# Patient Record
Sex: FEMALE | Race: WHITE | NOT HISPANIC OR LATINO | Employment: FULL TIME | URBAN - METROPOLITAN AREA
[De-identification: names, ages, dates, MRNs, and addresses within clinical notes are randomized per-mention and may not be internally consistent; named-entity substitution may affect disease eponyms.]

---

## 2017-06-06 ENCOUNTER — TRANSCRIBE ORDERS (OUTPATIENT)
Dept: ADMINISTRATIVE | Facility: HOSPITAL | Age: 47
End: 2017-06-06

## 2017-06-06 DIAGNOSIS — Z12.31 ENCOUNTER FOR MAMMOGRAM TO ESTABLISH BASELINE MAMMOGRAM: Primary | ICD-10-CM

## 2017-06-19 ENCOUNTER — APPOINTMENT (EMERGENCY)
Dept: RADIOLOGY | Facility: HOSPITAL | Age: 47
End: 2017-06-19
Payer: COMMERCIAL

## 2017-06-19 ENCOUNTER — APPOINTMENT (EMERGENCY)
Dept: CT IMAGING | Facility: HOSPITAL | Age: 47
End: 2017-06-19
Payer: COMMERCIAL

## 2017-06-19 ENCOUNTER — HOSPITAL ENCOUNTER (EMERGENCY)
Facility: HOSPITAL | Age: 47
Discharge: HOME | End: 2017-06-20
Attending: EMERGENCY MEDICINE | Admitting: EMERGENCY MEDICINE
Payer: COMMERCIAL

## 2017-06-19 VITALS
HEART RATE: 94 BPM | WEIGHT: 125 LBS | RESPIRATION RATE: 18 BRPM | TEMPERATURE: 98.2 F | DIASTOLIC BLOOD PRESSURE: 55 MMHG | OXYGEN SATURATION: 97 % | SYSTOLIC BLOOD PRESSURE: 111 MMHG

## 2017-06-19 DIAGNOSIS — J98.8 VIRAL RESPIRATORY ILLNESS: Primary | ICD-10-CM

## 2017-06-19 DIAGNOSIS — B97.89 VIRAL RESPIRATORY ILLNESS: Primary | ICD-10-CM

## 2017-06-19 LAB
ANION GAP SERPL CALCULATED.3IONS-SCNC: 9 MMOL/L (ref 4–13)
BASOPHILS # BLD AUTO: 0.03 THOUSANDS/ΜL (ref 0–0.1)
BASOPHILS NFR BLD AUTO: 0 % (ref 0–1)
BUN SERPL-MCNC: 14 MG/DL (ref 5–25)
CALCIUM SERPL-MCNC: 9.3 MG/DL (ref 8.3–10.1)
CHLORIDE SERPL-SCNC: 103 MMOL/L (ref 100–108)
CLARITY, POC: CLEAR
CO2 SERPL-SCNC: 26 MMOL/L (ref 21–32)
COLOR, POC: YELLOW
CREAT SERPL-MCNC: 0.89 MG/DL (ref 0.6–1.3)
EOSINOPHIL # BLD AUTO: 0.01 THOUSAND/ΜL (ref 0–0.61)
EOSINOPHIL NFR BLD AUTO: 0 % (ref 0–6)
ERYTHROCYTE [DISTWIDTH] IN BLOOD BY AUTOMATED COUNT: 11.9 % (ref 11.6–15.1)
EXT BILIRUBIN, UA: NEGATIVE
EXT BLOOD URINE: NORMAL
EXT GLUCOSE, UA: NEGATIVE
EXT KETONES: NEGATIVE
EXT NITRITE, UA: NEGATIVE
EXT PH, UA: 6
EXT PROTEIN, UA: NEGATIVE
EXT SPECIFIC GRAVITY, UA: 1
EXT UROBILINOGEN: 0.2
GFR SERPL CREATININE-BSD FRML MDRD: >60 ML/MIN/1.73SQ M
GLUCOSE SERPL-MCNC: 123 MG/DL (ref 65–140)
HCT VFR BLD AUTO: 39.7 % (ref 34.8–46.1)
HGB BLD-MCNC: 13.3 G/DL (ref 11.5–15.4)
LYMPHOCYTES # BLD AUTO: 2.15 THOUSANDS/ΜL (ref 0.6–4.47)
LYMPHOCYTES NFR BLD AUTO: 19 % (ref 14–44)
MCH RBC QN AUTO: 30 PG (ref 26.8–34.3)
MCHC RBC AUTO-ENTMCNC: 33.5 G/DL (ref 31.4–37.4)
MCV RBC AUTO: 90 FL (ref 82–98)
MONOCYTES # BLD AUTO: 0.6 THOUSAND/ΜL (ref 0.17–1.22)
MONOCYTES NFR BLD AUTO: 5 % (ref 4–12)
NEUTROPHILS # BLD AUTO: 8.67 THOUSANDS/ΜL (ref 1.85–7.62)
NEUTS SEG NFR BLD AUTO: 76 % (ref 43–75)
PLATELET # BLD AUTO: 360 THOUSANDS/UL (ref 149–390)
PMV BLD AUTO: 9.5 FL (ref 8.9–12.7)
POTASSIUM SERPL-SCNC: 4.5 MMOL/L (ref 3.5–5.3)
RBC # BLD AUTO: 4.43 MILLION/UL (ref 3.81–5.12)
SODIUM SERPL-SCNC: 138 MMOL/L (ref 136–145)
WBC # BLD AUTO: 11.46 THOUSAND/UL (ref 4.31–10.16)
WBC # BLD EST: NEGATIVE 10*3/UL

## 2017-06-19 PROCEDURE — 80048 BASIC METABOLIC PNL TOTAL CA: CPT | Performed by: EMERGENCY MEDICINE

## 2017-06-19 PROCEDURE — 71020 HB CHEST X-RAY 2VW FRONTAL&LATL: CPT

## 2017-06-19 PROCEDURE — 81002 URINALYSIS NONAUTO W/O SCOPE: CPT | Performed by: EMERGENCY MEDICINE

## 2017-06-19 PROCEDURE — 96374 THER/PROPH/DIAG INJ IV PUSH: CPT

## 2017-06-19 PROCEDURE — 70491 CT SOFT TISSUE NECK W/DYE: CPT

## 2017-06-19 PROCEDURE — 36415 COLL VENOUS BLD VENIPUNCTURE: CPT | Performed by: EMERGENCY MEDICINE

## 2017-06-19 PROCEDURE — 96361 HYDRATE IV INFUSION ADD-ON: CPT

## 2017-06-19 PROCEDURE — 85025 COMPLETE CBC W/AUTO DIFF WBC: CPT | Performed by: EMERGENCY MEDICINE

## 2017-06-19 RX ORDER — ALBUTEROL SULFATE 2.5 MG/3ML
5 SOLUTION RESPIRATORY (INHALATION) ONCE
Status: COMPLETED | OUTPATIENT
Start: 2017-06-19 | End: 2017-06-19

## 2017-06-19 RX ORDER — DIPHENHYDRAMINE HYDROCHLORIDE 50 MG/ML
50 INJECTION INTRAMUSCULAR; INTRAVENOUS ONCE
Status: COMPLETED | OUTPATIENT
Start: 2017-06-20 | End: 2017-06-19

## 2017-06-19 RX ORDER — ALBUTEROL SULFATE 90 UG/1
2 AEROSOL, METERED RESPIRATORY (INHALATION) ONCE
Status: COMPLETED | OUTPATIENT
Start: 2017-06-20 | End: 2017-06-19

## 2017-06-19 RX ADMIN — IPRATROPIUM BROMIDE 0.5 MG: 0.5 SOLUTION RESPIRATORY (INHALATION) at 22:38

## 2017-06-19 RX ADMIN — ALBUTEROL SULFATE 2 PUFF: 90 AEROSOL, METERED RESPIRATORY (INHALATION) at 23:58

## 2017-06-19 RX ADMIN — SODIUM CHLORIDE 1000 ML: 0.9 INJECTION, SOLUTION INTRAVENOUS at 22:37

## 2017-06-19 RX ADMIN — DIPHENHYDRAMINE HYDROCHLORIDE 50 MG: 50 INJECTION, SOLUTION INTRAMUSCULAR; INTRAVENOUS at 23:59

## 2017-06-19 RX ADMIN — IOHEXOL 85 ML: 350 INJECTION, SOLUTION INTRAVENOUS at 23:24

## 2017-06-19 RX ADMIN — ALBUTEROL SULFATE 5 MG: 2.5 SOLUTION RESPIRATORY (INHALATION) at 22:38

## 2017-06-20 PROCEDURE — 94640 AIRWAY INHALATION TREATMENT: CPT

## 2017-06-20 PROCEDURE — 99284 EMERGENCY DEPT VISIT MOD MDM: CPT

## 2017-06-28 ENCOUNTER — ALLSCRIPTS OFFICE VISIT (OUTPATIENT)
Dept: OTHER | Facility: OTHER | Age: 47
End: 2017-06-28

## 2017-06-28 ENCOUNTER — HOSPITAL ENCOUNTER (OUTPATIENT)
Dept: RADIOLOGY | Age: 47
Discharge: HOME | End: 2017-06-28
Payer: COMMERCIAL

## 2017-06-28 DIAGNOSIS — Z12.31 ENCOUNTER FOR SCREENING MAMMOGRAM FOR MALIGNANT NEOPLASM OF BREAST: ICD-10-CM

## 2017-06-28 PROCEDURE — 77063 BREAST TOMOSYNTHESIS BI: CPT

## 2017-06-28 PROCEDURE — G0202 SCR MAMMO BI INCL CAD: HCPCS

## 2017-06-30 LAB — PAP (HISTORICAL): NORMAL

## 2018-01-13 VITALS
BODY MASS INDEX: 24.94 KG/M2 | SYSTOLIC BLOOD PRESSURE: 110 MMHG | DIASTOLIC BLOOD PRESSURE: 70 MMHG | WEIGHT: 127 LBS | HEIGHT: 60 IN

## 2018-06-20 DIAGNOSIS — Z30.41 ENCOUNTER FOR SURVEILLANCE OF CONTRACEPTIVE PILLS: Primary | ICD-10-CM

## 2018-06-20 RX ORDER — NORETHINDRONE AND ETHINYL ESTRADIOL 0.5-0.035
KIT ORAL
Qty: 84 TABLET | Refills: 0 | Status: SHIPPED | OUTPATIENT
Start: 2018-06-20 | End: 2018-07-24 | Stop reason: SDUPTHER

## 2018-06-28 DIAGNOSIS — Z80.3 FAMILY HISTORY OF MALIGNANT NEOPLASM OF BREAST: ICD-10-CM

## 2018-06-28 DIAGNOSIS — Z12.31 ENCOUNTER FOR SCREENING MAMMOGRAM FOR BREAST CANCER: Primary | ICD-10-CM

## 2018-07-02 ENCOUNTER — TRANSCRIBE ORDERS (OUTPATIENT)
Dept: RADIOLOGY | Facility: CLINIC | Age: 48
End: 2018-07-02

## 2018-07-03 ENCOUNTER — HOSPITAL ENCOUNTER (OUTPATIENT)
Dept: RADIOLOGY | Age: 48
Discharge: HOME | End: 2018-07-03
Payer: COMMERCIAL

## 2018-07-03 DIAGNOSIS — Z80.3 FAMILY HISTORY OF MALIGNANT NEOPLASM OF BREAST: ICD-10-CM

## 2018-07-03 DIAGNOSIS — Z12.31 ENCOUNTER FOR SCREENING MAMMOGRAM FOR BREAST CANCER: ICD-10-CM

## 2018-07-03 PROCEDURE — 77067 SCR MAMMO BI INCL CAD: CPT

## 2018-07-03 PROCEDURE — 77063 BREAST TOMOSYNTHESIS BI: CPT

## 2018-07-24 ENCOUNTER — ANNUAL EXAM (OUTPATIENT)
Dept: OBGYN CLINIC | Facility: CLINIC | Age: 48
End: 2018-07-24
Payer: COMMERCIAL

## 2018-07-24 VITALS
HEIGHT: 60 IN | BODY MASS INDEX: 24.15 KG/M2 | SYSTOLIC BLOOD PRESSURE: 110 MMHG | WEIGHT: 123 LBS | DIASTOLIC BLOOD PRESSURE: 68 MMHG

## 2018-07-24 DIAGNOSIS — Z01.419 WELL WOMAN EXAM: Primary | ICD-10-CM

## 2018-07-24 DIAGNOSIS — Z30.41 ENCOUNTER FOR SURVEILLANCE OF CONTRACEPTIVE PILLS: ICD-10-CM

## 2018-07-24 PROBLEM — J45.909 ASTHMA: Status: ACTIVE | Noted: 2017-06-28

## 2018-07-24 PROCEDURE — 99396 PREV VISIT EST AGE 40-64: CPT | Performed by: PHYSICIAN ASSISTANT

## 2018-07-24 RX ORDER — OMEPRAZOLE 20 MG/1
TABLET, DELAYED RELEASE ORAL
COMMUNITY
End: 2018-07-24 | Stop reason: ALTCHOICE

## 2018-07-24 RX ORDER — ALBUTEROL SULFATE 90 UG/1
1 AEROSOL, METERED RESPIRATORY (INHALATION) EVERY 6 HOURS
COMMUNITY
End: 2018-07-24 | Stop reason: ALTCHOICE

## 2018-07-24 RX ORDER — LORAZEPAM 0.5 MG/1
0.5 TABLET ORAL DAILY
Status: ON HOLD | COMMUNITY
Start: 2018-02-27 | End: 2018-10-04 | Stop reason: ALTCHOICE

## 2018-07-24 NOTE — PROGRESS NOTES
Assessment/Plan   Diagnoses and all orders for this visit:    Well woman exam    Encounter for surveillance of contraceptive pills  -     norethindrone-ethinyl estradiol (NECON 0 5/35, 28,) 0 5-35 MG-MCG per tablet; Take 1 tablet by mouth daily    Other orders  -     LORazepam (ATIVAN) 0 5 mg tablet; Take 0 5 mg by mouth daily  -     Discontinue: omeprazole (PRILOSEC OTC) 20 MG tablet; Take by mouth  -     Discontinue: albuterol (PROVENTIL HFA,VENTOLIN HFA) 90 mcg/act inhaler; Inhale 1 puff every 6 (six) hours        Discussion  I have discussed the importance of monthly self-breast exams, exercise and healthy diet as well as adequate intake of calcium and vitamin D  Encourage at least 1200 mg calcium citrate + 2000 IUs vitamin D3 divided through diet and supplement throughout the day; Encourage 30-40 min weight bearing exercise most days of week  STD testing -   The current ASCCP guidelines were reviewed  The low risk patient will receive pap smear screening every 3 years or pap with HPV co-testing every 5 years  Per the current guidelines, pap smears may be discontinued after age 72  High risk patients will be triaged based on previous pap smear results, which have been reviewed; I emphasized the importance of an annual pelvic and breast exam  Patient opts to have a pap done today  A yearly mammogram is recommended for breast cancer screening starting at age 36; In addition, colon cancer screening with a colonoscopy is recommended starting at age 48 and reviewed benefits  I have reviewed the patient's risk factors for osteoporosis and ordered a dexa scan if applicable  Pt to check with insurance for coverage  All questions have been answered to her satisfaction  RTO for APE or sooner if needed      Subjective     Pt for annual GYN exam        Gerald Janene is a 53 y o  female who presents for annual well woman exam    LMP - 7/18/18 ; Denies  bleeding  No vulvar itch/burn; No vaginal itch/burn;  No abn discharge or odor; No urinary sx - burning/pain/frequency/hematuria  (+) SBEs - no breast masses, asymmetry, nipple discharge or bleeding, changes in skin of breast, or breast tenderness bilaterally  No abd/pelvic pain or HAs; No menopausal symptoms: No hot flashes/night sweats, problems with intercourse, vaginal dryness; sleeping well  Pt is sexually active in a mutually monog/ sexual relationship; No issues with intercourse; She declines std/hiv/hep testing; Feels safe at home    (+) PCP for routine Bw/care; Dr Simi Segovia    Last Pap -  but desires repeat today  History of abnormal Pap smear: none  Last mammo - 7/3/18  History of abnormal mammogram: none  Last colonoscopy - few years ago, plans f/u w GI soon due to increase in diarrhea lately    Review of Systems   Constitutional: Negative  Respiratory: Negative  Gastrointestinal: Negative  Endocrine: Negative  Genitourinary: Negative  The following portions of the patient's history were reviewed and updated as appropriate: current medications, past family history, past medical history, past social history, past surgical history and problem list     Period Cycle (Days): 28  Period Duration (Days): 4-5  Period Pattern: Regular  Menstrual Flow: Moderate    OB History      Para Term  AB Living    1              SAB TAB Ectopic Multiple Live Births                       Past Medical History:   Diagnosis Date    Disease of thyroid gland        Past Surgical History:   Procedure Laterality Date    THYROIDECTOMY Right        Family History   Problem Relation Age of Onset    Breast cancer Mother     Coronary artery disease Mother     Diabetes Maternal Grandmother        Social History     Social History    Marital status: /Civil Union     Spouse name: N/A    Number of children: N/A    Years of education: N/A     Occupational History    Not on file       Social History Main Topics    Smoking status: Never Smoker    Smokeless tobacco: Not on file    Alcohol use Yes      Comment: occassional     Drug use: No    Sexual activity: Not on file     Other Topics Concern    Not on file     Social History Narrative    No narrative on file         Current Outpatient Prescriptions:     LORazepam (ATIVAN) 0 5 mg tablet, Take 0 5 mg by mouth daily, Disp: , Rfl:     norethindrone-ethinyl estradiol (NECON 0 5/35, 28,) 0 5-35 MG-MCG per tablet, Take 1 tablet by mouth daily, Disp: 84 tablet, Rfl: 3    Allergies   Allergen Reactions    Keflex [Cephalexin]     Sulfamethoxazole-Trimethoprim        Objective   Vitals:    07/24/18 1605   BP: 110/68   BP Location: Left arm   Patient Position: Sitting   Cuff Size: Adult   Weight: 55 8 kg (123 lb)   Height: 5' (1 524 m)     Physical Exam   Constitutional: She is oriented to person, place, and time  She appears well-developed and well-nourished  HENT:   Head: Normocephalic and atraumatic  Cardiovascular: Normal rate and regular rhythm  Pulmonary/Chest: Effort normal and breath sounds normal  Right breast exhibits no inverted nipple, no mass, no nipple discharge, no skin change and no tenderness  Left breast exhibits no inverted nipple, no mass, no nipple discharge, no skin change and no tenderness  Breasts are symmetrical    Abdominal: Soft  She exhibits no distension and no mass  There is no rebound and no guarding  Neurological: She is alert and oriented to person, place, and time  Skin: Skin is warm and dry  Psychiatric: She has a normal mood and affect  Patient Instructions   Will continue pills for now and f/u 1 year, earlier as needed

## 2018-07-27 LAB — THIN PREP CVX: NORMAL

## 2018-09-15 DIAGNOSIS — Z30.41 ENCOUNTER FOR SURVEILLANCE OF CONTRACEPTIVE PILLS: ICD-10-CM

## 2018-09-17 RX ORDER — NORETHINDRONE AND ETHINYL ESTRADIOL 0.5-0.035
KIT ORAL
Qty: 84 TABLET | Refills: 0 | OUTPATIENT
Start: 2018-09-17

## 2018-09-18 ENCOUNTER — OFFICE VISIT (OUTPATIENT)
Dept: GASTROENTEROLOGY | Facility: AMBULARY SURGERY CENTER | Age: 48
End: 2018-09-18
Payer: COMMERCIAL

## 2018-09-18 VITALS
SYSTOLIC BLOOD PRESSURE: 112 MMHG | HEIGHT: 60 IN | HEART RATE: 87 BPM | BODY MASS INDEX: 23.4 KG/M2 | WEIGHT: 119.2 LBS | DIASTOLIC BLOOD PRESSURE: 65 MMHG | TEMPERATURE: 97.7 F

## 2018-09-18 DIAGNOSIS — R19.7 DIARRHEA, UNSPECIFIED TYPE: Primary | ICD-10-CM

## 2018-09-18 PROCEDURE — 99204 OFFICE O/P NEW MOD 45 MIN: CPT | Performed by: INTERNAL MEDICINE

## 2018-09-18 NOTE — PROGRESS NOTES
Consultation - [facility]  Damaris Shi 1970 female         Chief Complaint:  Stomach issue    HPI:  32-year-old female with history of hypothyroidism has problems with chronic stomach issues for few years  She had a colonoscopy about 6 years ago and was told about possible colitis  She was never any medication and her symptoms were improved gradually  Now she is more issues in the past couple of months  Discussed as episodes of diarrhea associated with cramping and bloating usually once a week when she takes Imodium with help  Complaining about gassiness and some discomfort  Good appetite, no recent weight loss  Denies any blood or mucus in the stool  Denies any recent travel history or antibiotic use  No heartburn acid reflux  Denies any difficulty swallowing  REVIEW OF SYSTEMS: Review of Systems   Constitutional: Negative for activity change, appetite change, chills, diaphoresis, fatigue, fever and unexpected weight change  HENT: Negative for ear discharge, ear pain, facial swelling, hearing loss, nosebleeds, sore throat, tinnitus and voice change  Eyes: Negative for pain, discharge, redness, itching and visual disturbance  Respiratory: Negative for apnea, cough, chest tightness, shortness of breath and wheezing  Cardiovascular: Negative for chest pain and palpitations  Gastrointestinal:        As noted in HPI   Endocrine: Negative for cold intolerance, heat intolerance and polyuria  Genitourinary: Negative for difficulty urinating, dysuria, flank pain, hematuria and urgency  Musculoskeletal: Negative for arthralgias, back pain, gait problem, joint swelling and myalgias  Skin: Negative for rash and wound  Neurological: Negative for dizziness, tremors, seizures, speech difficulty, light-headedness, numbness and headaches  Hematological: Negative for adenopathy  Does not bruise/bleed easily     Psychiatric/Behavioral: Negative for agitation, behavioral problems and confusion  The patient is not nervous/anxious  Past Medical History:   Diagnosis Date    Disease of thyroid gland     GERD (gastroesophageal reflux disease)     Ulcerative colitis ([facility] [address] )       Past Surgical History:   Procedure Laterality Date    THYROIDECTOMY Right      Social History     Social History    Marital status: /Civil Union     Spouse name: N/A    Number of children: N/A    Years of education: N/A     Occupational History    Not on file  Social History Main Topics    Smoking status: Never Smoker    Smokeless tobacco: Never Used    Alcohol use Yes      Comment: occassional     Drug use: No    Sexual activity: Not on file     Other Topics Concern    Not on file     Social History Narrative    No narrative on file     Family History   Problem Relation Age of Onset    Breast cancer Mother     Coronary artery disease Mother     Diabetes Maternal Grandmother      Keflex [cephalexin] and Sulfamethoxazole-trimethoprim  Current Outpatient Prescriptions   Medication Sig Dispense Refill    norethindrone-ethinyl estradiol (Candice Prophet 0 5/35, 28,) 0 5-35 MG-MCG per tablet Take 1 tablet by mouth daily 84 tablet 3    LORazepam (ATIVAN) 0 5 mg tablet Take 0 5 mg by mouth daily      Na Sulfate-K Sulfate-Mg Sulf (SUPREP BOWEL PREP KIT) 17 5-3 13-1 6 GM/180ML SOLN Take 2 Bottles by mouth see administration instructions Please follow the instructions from the office 2 Bottle 0     No current facility-administered medications for this visit  Blood pressure 112/65, pulse 87, temperature 97 7 °F (36 5 °C), temperature source Tympanic, height 5' (1 524 m), weight 54 1 kg (119 lb 3 2 oz)  PHYSICAL EXAM: Physical Exam   Constitutional: She is oriented to person, place, and time  She appears well-developed and well-nourished  HENT:   Head: Normocephalic and atraumatic     Nose: Nose normal    Mouth/Throat: Oropharynx is clear and moist    Eyes: Conjunctivae are normal  Right eye exhibits no discharge  Left eye exhibits no discharge  No scleral icterus  Neck: Neck supple  No JVD present  No tracheal deviation present  No thyromegaly present  Cardiovascular: Normal rate, regular rhythm and normal heart sounds  Exam reveals no gallop and no friction rub  No murmur heard  Pulmonary/Chest: Effort normal and breath sounds normal  No respiratory distress  She has no wheezes  She has no rales  She exhibits no tenderness  Abdominal: Soft  Bowel sounds are normal  She exhibits no distension and no mass  There is no tenderness  There is no rebound and no guarding  No hernia  Musculoskeletal: She exhibits no edema, tenderness or deformity  Lymphadenopathy:     She has no cervical adenopathy  Neurological: She is alert and oriented to person, place, and time  Skin: Skin is warm and dry  No rash noted  No erythema  Psychiatric: She has a normal mood and affect  Her behavior is normal  Thought content normal         Lab Results   Component Value Date    WBC 11 46 (H) 06/19/2017    HGB 13 3 06/19/2017    HCT 39 7 06/19/2017    MCV 90 06/19/2017     06/19/2017     Lab Results   Component Value Date    CALCIUM 9 3 06/19/2017     06/19/2017    K 4 5 06/19/2017    CO2 26 06/19/2017     06/19/2017    BUN 14 06/19/2017    CREATININE 0 89 06/19/2017     No results found for: ALT, AST, GGT, ALKPHOS, BILITOT  No results found for: INR, PROTIME    No results found  ASSESSMENT & PLAN:    Diarrhea  Symptoms appear to most likely functional or diet related  Rule out lactose intolerance  Doubt for inflammatory bowel disease  Consider microscopic colitis  Rule out celiac disease     -check celiac disease panel    -check stool studies including C diff, cultures    -Schedule for colonoscopy  -High-fiber diet     -Patient was given instructions about the colonoscopy prep     -Patient was explained about  the risks and benefits of the procedure   Risks including but not limited to bleeding, infection, perforation were explained in detail  Also explained about less than 100% sensitivity with the exam and other alternatives

## 2018-09-18 NOTE — ASSESSMENT & PLAN NOTE
Symptoms appear to most likely functional or diet related  Rule out lactose intolerance  Doubt for inflammatory bowel disease  Consider microscopic colitis  Rule out celiac disease     -check celiac disease panel    -check stool studies including C diff, cultures    -Schedule for colonoscopy  -High-fiber diet     -Patient was given instructions about the colonoscopy prep     -Patient was explained about  the risks and benefits of the procedure  Risks including but not limited to bleeding, infection, perforation were explained in detail  Also explained about less than 100% sensitivity with the exam and other alternatives

## 2018-09-20 ENCOUNTER — ANESTHESIA EVENT (OUTPATIENT)
Dept: PERIOP | Facility: AMBULARY SURGERY CENTER | Age: 48
End: 2018-09-20
Payer: COMMERCIAL

## 2018-09-25 ENCOUNTER — TELEPHONE (OUTPATIENT)
Dept: GASTROENTEROLOGY | Facility: CLINIC | Age: 48
End: 2018-09-25

## 2018-09-30 LAB
ENDOMYSIUM IGA SER QL: NEGATIVE
IGA SERPL-MCNC: 134 MG/DL (ref 87–352)
TTG IGA SER-ACNC: <2 U/ML (ref 0–3)

## 2018-10-02 ENCOUNTER — TELEPHONE (OUTPATIENT)
Dept: GASTROENTEROLOGY | Facility: AMBULARY SURGERY CENTER | Age: 48
End: 2018-10-02

## 2018-10-02 NOTE — TELEPHONE ENCOUNTER
----- Message from Charmaine Elizondo MD sent at 10/2/2018  1:25 PM EDT -----  Celiac disease panel is negative

## 2018-10-04 ENCOUNTER — HOSPITAL ENCOUNTER (OUTPATIENT)
Facility: AMBULARY SURGERY CENTER | Age: 48
Setting detail: OUTPATIENT SURGERY
Discharge: HOME | End: 2018-10-04
Attending: INTERNAL MEDICINE | Admitting: INTERNAL MEDICINE
Payer: COMMERCIAL

## 2018-10-04 ENCOUNTER — ANESTHESIA (OUTPATIENT)
Dept: PERIOP | Facility: AMBULARY SURGERY CENTER | Age: 48
End: 2018-10-04
Payer: COMMERCIAL

## 2018-10-04 VITALS
RESPIRATION RATE: 18 BRPM | TEMPERATURE: 97.7 F | DIASTOLIC BLOOD PRESSURE: 76 MMHG | HEIGHT: 60 IN | WEIGHT: 116 LBS | OXYGEN SATURATION: 99 % | BODY MASS INDEX: 22.78 KG/M2 | HEART RATE: 68 BPM | SYSTOLIC BLOOD PRESSURE: 118 MMHG

## 2018-10-04 DIAGNOSIS — R19.7 DIARRHEA, UNSPECIFIED TYPE: ICD-10-CM

## 2018-10-04 DIAGNOSIS — R10.13 DYSPEPSIA: Primary | ICD-10-CM

## 2018-10-04 LAB — EXT PREGNANCY TEST URINE: NEGATIVE

## 2018-10-04 PROCEDURE — 88305 TISSUE EXAM BY PATHOLOGIST: CPT | Performed by: PATHOLOGY

## 2018-10-04 PROCEDURE — 88342 IMHCHEM/IMCYTCHM 1ST ANTB: CPT | Performed by: PATHOLOGY

## 2018-10-04 PROCEDURE — 45380 COLONOSCOPY AND BIOPSY: CPT | Performed by: INTERNAL MEDICINE

## 2018-10-04 PROCEDURE — 43239 EGD BIOPSY SINGLE/MULTIPLE: CPT | Performed by: INTERNAL MEDICINE

## 2018-10-04 PROCEDURE — 81025 URINE PREGNANCY TEST: CPT | Performed by: INTERNAL MEDICINE

## 2018-10-04 RX ORDER — ALBUTEROL SULFATE 2.5 MG/3ML
2.5 SOLUTION RESPIRATORY (INHALATION) ONCE AS NEEDED
Status: DISCONTINUED | OUTPATIENT
Start: 2018-10-04 | End: 2018-10-04 | Stop reason: HOSPADM

## 2018-10-04 RX ORDER — SODIUM CHLORIDE 9 MG/ML
125 INJECTION, SOLUTION INTRAVENOUS CONTINUOUS
Status: DISCONTINUED | OUTPATIENT
Start: 2018-10-04 | End: 2018-10-04 | Stop reason: HOSPADM

## 2018-10-04 RX ORDER — PROPOFOL 10 MG/ML
INJECTION, EMULSION INTRAVENOUS AS NEEDED
Status: DISCONTINUED | OUTPATIENT
Start: 2018-10-04 | End: 2018-10-04 | Stop reason: SURG

## 2018-10-04 RX ORDER — ONDANSETRON 2 MG/ML
4 INJECTION INTRAMUSCULAR; INTRAVENOUS ONCE AS NEEDED
Status: DISCONTINUED | OUTPATIENT
Start: 2018-10-04 | End: 2018-10-04 | Stop reason: HOSPADM

## 2018-10-04 RX ORDER — MEPERIDINE HYDROCHLORIDE 25 MG/ML
12.5 INJECTION INTRAMUSCULAR; INTRAVENOUS; SUBCUTANEOUS AS NEEDED
Status: DISCONTINUED | OUTPATIENT
Start: 2018-10-04 | End: 2018-10-04 | Stop reason: HOSPADM

## 2018-10-04 RX ORDER — PANTOPRAZOLE SODIUM 40 MG/1
40 TABLET, DELAYED RELEASE ORAL DAILY
Qty: 30 TABLET | Refills: 3 | Status: SHIPPED | OUTPATIENT
Start: 2018-10-04 | End: 2020-12-10

## 2018-10-04 RX ORDER — CHOLESTYRAMINE 4 G/9G
1 POWDER, FOR SUSPENSION ORAL 3 TIMES DAILY PRN
Qty: 90 EACH | Refills: 3 | Status: SHIPPED | OUTPATIENT
Start: 2018-10-04 | End: 2020-12-10

## 2018-10-04 RX ORDER — LIDOCAINE HYDROCHLORIDE 10 MG/ML
INJECTION, SOLUTION INFILTRATION; PERINEURAL AS NEEDED
Status: DISCONTINUED | OUTPATIENT
Start: 2018-10-04 | End: 2018-10-04 | Stop reason: SURG

## 2018-10-04 RX ADMIN — PROPOFOL 50 MG: 10 INJECTION, EMULSION INTRAVENOUS at 08:56

## 2018-10-04 RX ADMIN — PROPOFOL 30 MG: 10 INJECTION, EMULSION INTRAVENOUS at 09:00

## 2018-10-04 RX ADMIN — LIDOCAINE HYDROCHLORIDE 50 MG: 10 INJECTION, SOLUTION INFILTRATION; PERINEURAL at 08:47

## 2018-10-04 RX ADMIN — SODIUM CHLORIDE: 0.9 INJECTION, SOLUTION INTRAVENOUS at 08:00

## 2018-10-04 RX ADMIN — PROPOFOL 40 MG: 10 INJECTION, EMULSION INTRAVENOUS at 09:02

## 2018-10-04 RX ADMIN — PROPOFOL 30 MG: 10 INJECTION, EMULSION INTRAVENOUS at 08:59

## 2018-10-04 RX ADMIN — PROPOFOL 40 MG: 10 INJECTION, EMULSION INTRAVENOUS at 09:04

## 2018-10-04 RX ADMIN — PROPOFOL 150 MG: 10 INJECTION, EMULSION INTRAVENOUS at 08:50

## 2018-10-04 NOTE — ANESTHESIA POSTPROCEDURE EVALUATION
Post-Op Assessment Note      CV Status:  Stable    Mental Status:  Alert and awake    Hydration Status:  Euvolemic    PONV Controlled:  Controlled    Airway Patency:  Patent    Post Op Vitals Reviewed: Yes          Staff: CRNA           BP   107/62   Temp      Pulse 63   Resp 18   SpO2 100

## 2018-10-04 NOTE — H&P (VIEW-ONLY)
Consultation - [facility]  Alysha Rolling 1970 female         Chief Complaint:  Stomach issue    HPI:  43-year-old female with history of hypothyroidism has problems with chronic stomach issues for few years  She had a colonoscopy about 6 years ago and was told about possible colitis  She was never any medication and her symptoms were improved gradually  Now she is more issues in the past couple of months  Discussed as episodes of diarrhea associated with cramping and bloating usually once a week when she takes Imodium with help  Complaining about gassiness and some discomfort  Good appetite, no recent weight loss  Denies any blood or mucus in the stool  Denies any recent travel history or antibiotic use  No heartburn acid reflux  Denies any difficulty swallowing  REVIEW OF SYSTEMS: Review of Systems   Constitutional: Negative for activity change, appetite change, chills, diaphoresis, fatigue, fever and unexpected weight change  HENT: Negative for ear discharge, ear pain, facial swelling, hearing loss, nosebleeds, sore throat, tinnitus and voice change  Eyes: Negative for pain, discharge, redness, itching and visual disturbance  Respiratory: Negative for apnea, cough, chest tightness, shortness of breath and wheezing  Cardiovascular: Negative for chest pain and palpitations  Gastrointestinal:        As noted in HPI   Endocrine: Negative for cold intolerance, heat intolerance and polyuria  Genitourinary: Negative for difficulty urinating, dysuria, flank pain, hematuria and urgency  Musculoskeletal: Negative for arthralgias, back pain, gait problem, joint swelling and myalgias  Skin: Negative for rash and wound  Neurological: Negative for dizziness, tremors, seizures, speech difficulty, light-headedness, numbness and headaches  Hematological: Negative for adenopathy  Does not bruise/bleed easily     Psychiatric/Behavioral: Negative for agitation, behavioral problems and confusion  The patient is not nervous/anxious  Past Medical History:   Diagnosis Date    Disease of thyroid gland     GERD (gastroesophageal reflux disease)     Ulcerative colitis ([facility] [address] )       Past Surgical History:   Procedure Laterality Date    THYROIDECTOMY Right      Social History     Social History    Marital status: /Civil Union     Spouse name: N/A    Number of children: N/A    Years of education: N/A     Occupational History    Not on file  Social History Main Topics    Smoking status: Never Smoker    Smokeless tobacco: Never Used    Alcohol use Yes      Comment: occassional     Drug use: No    Sexual activity: Not on file     Other Topics Concern    Not on file     Social History Narrative    No narrative on file     Family History   Problem Relation Age of Onset    Breast cancer Mother     Coronary artery disease Mother     Diabetes Maternal Grandmother      Keflex [cephalexin] and Sulfamethoxazole-trimethoprim  Current Outpatient Prescriptions   Medication Sig Dispense Refill    norethindrone-ethinyl estradiol (Cy Sharper 0 5/35, 28,) 0 5-35 MG-MCG per tablet Take 1 tablet by mouth daily 84 tablet 3    LORazepam (ATIVAN) 0 5 mg tablet Take 0 5 mg by mouth daily      Na Sulfate-K Sulfate-Mg Sulf (SUPREP BOWEL PREP KIT) 17 5-3 13-1 6 GM/180ML SOLN Take 2 Bottles by mouth see administration instructions Please follow the instructions from the office 2 Bottle 0     No current facility-administered medications for this visit  Blood pressure 112/65, pulse 87, temperature 97 7 °F (36 5 °C), temperature source Tympanic, height 5' (1 524 m), weight 54 1 kg (119 lb 3 2 oz)  PHYSICAL EXAM: Physical Exam   Constitutional: She is oriented to person, place, and time  She appears well-developed and well-nourished  HENT:   Head: Normocephalic and atraumatic     Nose: Nose normal    Mouth/Throat: Oropharynx is clear and moist    Eyes: Conjunctivae are normal  Right eye exhibits no discharge  Left eye exhibits no discharge  No scleral icterus  Neck: Neck supple  No JVD present  No tracheal deviation present  No thyromegaly present  Cardiovascular: Normal rate, regular rhythm and normal heart sounds  Exam reveals no gallop and no friction rub  No murmur heard  Pulmonary/Chest: Effort normal and breath sounds normal  No respiratory distress  She has no wheezes  She has no rales  She exhibits no tenderness  Abdominal: Soft  Bowel sounds are normal  She exhibits no distension and no mass  There is no tenderness  There is no rebound and no guarding  No hernia  Musculoskeletal: She exhibits no edema, tenderness or deformity  Lymphadenopathy:     She has no cervical adenopathy  Neurological: She is alert and oriented to person, place, and time  Skin: Skin is warm and dry  No rash noted  No erythema  Psychiatric: She has a normal mood and affect  Her behavior is normal  Thought content normal         Lab Results   Component Value Date    WBC 11 46 (H) 06/19/2017    HGB 13 3 06/19/2017    HCT 39 7 06/19/2017    MCV 90 06/19/2017     06/19/2017     Lab Results   Component Value Date    CALCIUM 9 3 06/19/2017     06/19/2017    K 4 5 06/19/2017    CO2 26 06/19/2017     06/19/2017    BUN 14 06/19/2017    CREATININE 0 89 06/19/2017     No results found for: ALT, AST, GGT, ALKPHOS, BILITOT  No results found for: INR, PROTIME    No results found  ASSESSMENT & PLAN:    Diarrhea  Symptoms appear to most likely functional or diet related  Rule out lactose intolerance  Doubt for inflammatory bowel disease  Consider microscopic colitis  Rule out celiac disease     -check celiac disease panel    -check stool studies including C diff, cultures    -Schedule for colonoscopy  -High-fiber diet     -Patient was given instructions about the colonoscopy prep     -Patient was explained about  the risks and benefits of the procedure   Risks including but not limited to bleeding, infection, perforation were explained in detail  Also explained about less than 100% sensitivity with the exam and other alternatives

## 2018-10-04 NOTE — OP NOTE
COLONOSCOPY    PROCEDURE: Colonoscopy/ Biopsy    INDICATIONS: Diarrhea    POST-OP DIAGNOSIS: See the impression below    SEDATION: Monitored anesthesia care, check anesthesia records    PHYSICAL EXAM:    /80   Pulse 82   Temp 98 4 °F (36 9 °C) (Temporal)   Resp 16   Ht 5' (1 524 m)   Wt 52 6 kg (116 lb)   SpO2 97%   BMI 22 65 kg/m²   Body mass index is 22 65 kg/m²  General: NAD  Heart: S1 & S2 normal, RRR  Lungs: CTA, No rales or rhonchi  Abdomen: Soft, nontender, nondistended, good bowel sounds    CONSENT:  Informed consent was obtained for the procedure, including sedation after explaining the risks and benefits of the procedure  Risks including but not limited to bleeding, perforation, infection, aspiration were discussed in detail  Also explained about less than 100%$ sensitivity with the exam and other alternatives  PREPARATION:   EKG tracing, pulse oximetry, blood pressure were monitored throughout the procedure  Patient was identified by myself both verbally and by visual inspection of ID band  DESCRIPTION:   Patient was placed in the left lateral decubitus position and was sedated with the above medication  Digital rectal examination was performed  The colonoscope was introduced in to the anal canal and advanced up to terminal ileum  A careful inspection was made as the colonoscope was withdrawn, including a retroflexed view of the rectum; findings and interventions are described below  Appropriate photodocumentation was obtained  The quality of the colonic preparation was adequate  FINDINGS:    1  Terminal ileum-normal mucosa    2  The rest of the colon mucosa is normal   Random biopsies were done to check for microscopic colitis    3  Internal hemorrhoids          IMPRESSIONS:      As above    RECOMMENDATIONS:    Check pathology    May take Questran as needed    COMPLICATIONS:  None; patient tolerated the procedure well      DISPOSITION: PACU           CONDITION: Stable

## 2018-10-04 NOTE — OP NOTE
ESOPHAGOGASTRODUODENOSCOPY    PROCEDURE: EGD/ Biopsy    INDICATIONS: Dyspepsia and Diarrhea    POST-OP DIAGNOSIS: See the impression below    SEDATION: Monitored anesthesia care, check anesthesia records    PHYSICAL EXAM:    Vitals:    10/04/18 0805   BP: 111/80   Pulse: 82   Resp: 16   Temp: 98 4 °F (36 9 °C)   SpO2: 97%    Body mass index is 22 65 kg/m²  General: NAD  Heart: S1 & S2 normal, RRR  Lungs: CTA, No rales or rhonchi  Abdomen: Soft, nontender, nondistended, good bowel sounds    CONSENT:  Informed consent was obtained for the procedure, including sedation after explaining the risks and benefits of the procedure  Risks including but not limited to bleeding, perforation, infection, aspiration were discussed in detail  Also explained about less than 100% sensitivity with the exam and other alternatives  PREPARATION:   EKG tracing, pulse oximetry, blood pressure were monitored throughout the procedure  Patient was identified by myself both verbally and by visual inspection of ID band  DESCRIPTION:   Patient was placed in the left lateral decubitus position and was sedated with the above medication  The gastroscope was introduced in to the oropharynx and the esophagus was intubated under direct visualization  Scope was passed down the esophagus up to 2nd part of the duodenum  A careful inspection was made as the gastroscope was withdrawn, including a retroflexed view of the stomach; findings and interventions are described below  FINDINGS:    #1  Esophagus and GEJ- about 2 cm hiatal hernia was seen  #2  Stomach- patchy erythema was noted in the antrum along with couple of erosions  Biopsies done to check for H pylori  #3  Duodenum- normal         IMPRESSIONS:      As above    RECOMMENDATIONS:     Check pathology    Protonix for couple of months    COMPLICATIONS:  None; patient tolerated the procedure well            DISPOSITION: PACU           CONDITION: Stable

## 2018-10-04 NOTE — DISCHARGE INSTR - AVS FIRST PAGE
ESOPHAGOGASTRODUODENOSCOPY    PROCEDURE: EGD/ Biopsy    INDICATIONS: Dyspepsia and Diarrhea    POST-OP DIAGNOSIS: See the impression below    SEDATION: Monitored anesthesia care, check anesthesia records    PHYSICAL EXAM:    Vitals:    10/04/18 0805   BP: 111/80   Pulse: 82   Resp: 16   Temp: 98 4 °F (36 9 °C)   SpO2: 97%    Body mass index is 22 65 kg/m²  General: NAD  Heart: S1 & S2 normal, RRR  Lungs: CTA, No rales or rhonchi  Abdomen: Soft, nontender, nondistended, good bowel sounds    CONSENT:  Informed consent was obtained for the procedure, including sedation after explaining the risks and benefits of the procedure  Risks including but not limited to bleeding, perforation, infection, aspiration were discussed in detail  Also explained about less than 100% sensitivity with the exam and other alternatives  PREPARATION:   EKG tracing, pulse oximetry, blood pressure were monitored throughout the procedure  Patient was identified by myself both verbally and by visual inspection of ID band  DESCRIPTION:   Patient was placed in the left lateral decubitus position and was sedated with the above medication  The gastroscope was introduced in to the oropharynx and the esophagus was intubated under direct visualization  Scope was passed down the esophagus up to 2nd part of the duodenum  A careful inspection was made as the gastroscope was withdrawn, including a retroflexed view of the stomach; findings and interventions are described below  FINDINGS:    #1  Esophagus and GEJ- about 2 cm hiatal hernia was seen  #2  Stomach- patchy erythema was noted in the antrum along with couple of erosions  Biopsies done to check for H pylori  #3  Duodenum- normal         IMPRESSIONS:      As above    RECOMMENDATIONS:     Check pathology    Protonix for couple of months    COMPLICATIONS:  None; patient tolerated the procedure well            DISPOSITION: PACU           CONDITION: Stable    COLONOSCOPY    PROCEDURE: Colonoscopy/ Biopsy    INDICATIONS: Diarrhea    POST-OP DIAGNOSIS: See the impression below    SEDATION: Monitored anesthesia care, check anesthesia records    PHYSICAL EXAM:    /80   Pulse 82   Temp 98 4 °F (36 9 °C) (Temporal)   Resp 16   Ht 5' (1 524 m)   Wt 52 6 kg (116 lb)   SpO2 97%   BMI 22 65 kg/m²    Body mass index is 22 65 kg/m²  General: NAD  Heart: S1 & S2 normal, RRR  Lungs: CTA, No rales or rhonchi  Abdomen: Soft, nontender, nondistended, good bowel sounds    CONSENT:  Informed consent was obtained for the procedure, including sedation after explaining the risks and benefits of the procedure  Risks including but not limited to bleeding, perforation, infection, aspiration were discussed in detail  Also explained about less than 100%$ sensitivity with the exam and other alternatives  PREPARATION:   EKG tracing, pulse oximetry, blood pressure were monitored throughout the procedure  Patient was identified by myself both verbally and by visual inspection of ID band  DESCRIPTION:   Patient was placed in the left lateral decubitus position and was sedated with the above medication  Digital rectal examination was performed  The colonoscope was introduced in to the anal canal and advanced up to terminal ileum  A careful inspection was made as the colonoscope was withdrawn, including a retroflexed view of the rectum; findings and interventions are described below  Appropriate photodocumentation was obtained  The quality of the colonic preparation was adequate  FINDINGS:    1  Terminal ileum-normal mucosa    2  The rest of the colon mucosa is normal   Random biopsies were done to check for microscopic colitis    3  Internal hemorrhoids          IMPRESSIONS:      As above    RECOMMENDATIONS:    Check pathology    May take Questran as needed    COMPLICATIONS:  None; patient tolerated the procedure well      DISPOSITION: PACU           CONDITION: Stable

## 2018-10-04 NOTE — INTERVAL H&P NOTE
Patient was seen and examined  No change in the previous HPI  Patient reports having more symptoms of bloating and gassiness  Should also rule out celiac disease    Schedule both EGD and colonoscopy

## 2018-10-04 NOTE — ANESTHESIA PREPROCEDURE EVALUATION
Review of Systems/Medical History  Patient summary reviewed        Cardiovascular  Negative cardio ROS    Pulmonary  Negative pulmonary ROS Asthma , well controlled/ stable ,        GI/Hepatic  Negative GI/hepatic ROS          Negative  ROS        Endo/Other  Negative endo/other ROS History of thyroid disease , hypothyroidism,      GYN  Negative gynecology ROS          Hematology  Negative hematology ROS      Musculoskeletal  Negative musculoskeletal ROS        Neurology  Negative neurology ROS      Psychology   Negative psychology ROS              Physical Exam    Airway    Mallampati score: II  TM Distance: >3 FB  Neck ROM: full     Dental   No notable dental hx     Cardiovascular  Comment: Negative ROS,     Pulmonary      Other Findings        Anesthesia Plan  ASA Score- 2     Anesthesia Type- IV sedation with anesthesia with ASA Monitors  Additional Monitors:   Airway Plan:     Comment: I have seen the patient and reviewed the history  Patient to receive IV sedation with full ASA monitors  Risks discussed with the patient, consent signed        Plan Factors-    Induction- intravenous  Postoperative Plan-     Informed Consent- Anesthetic plan and risks discussed with patient  I personally reviewed this patient with the CRNA  Discussed and agreed on the Anesthesia Plan with the JOHANNA Luna

## 2018-10-07 LAB
ADV 40+41 DNA STL QL NAA+NON-PROBE: NOT DETECTED
ASTRO TYP 1-8 RNA STL QL NAA+NON-PROBE: NOT DETECTED
C CAYETANENSIS DNA STL QL NAA+NON-PROBE: NOT DETECTED
C COLI+JEJ+UPSA DNA STL QL NAA+NON-PROBE: NOT DETECTED
C DIF TOX TCDA+TCDB STL QL NAA+NON-PROBE: NOT DETECTED
C DIFF TOX GENS STL QL NAA+PROBE: NEGATIVE
CRYPTOSP DNA STL QL NAA+NON-PROBE: NOT DETECTED
E COLI O157 DNA STL QL NAA+NON-PROBE: NORMAL
E HISTOLYT DNA STL QL NAA+NON-PROBE: NOT DETECTED
EAEC PAA PLAS AGGR+AATA ST NAA+NON-PRB: NOT DETECTED
EC STX1+STX2 GENES STL QL NAA+NON-PROBE: NOT DETECTED
EPEC EAE GENE STL QL NAA+NON-PROBE: NOT DETECTED
ETEC LTA+ST1A+ST1B TOX ST NAA+NON-PROBE: NOT DETECTED
G LAMBLIA AG STL QL IA: NEGATIVE
G LAMBLIA DNA STL QL NAA+NON-PROBE: NOT DETECTED
Lab: NORMAL
Lab: NORMAL
NOROVIRUS GI+II RNA STL QL NAA+NON-PROBE: NOT DETECTED
O+P STL CONC: NORMAL
P SHIGELLOIDES DNA STL QL NAA+NON-PROBE: NOT DETECTED
RVA RNA STL QL NAA+NON-PROBE: NOT DETECTED
S ENT+BONG DNA STL QL NAA+NON-PROBE: NOT DETECTED
SAPO I+II+IV+V RNA STL QL NAA+NON-PROBE: NOT DETECTED
SHIGELLA SP+EIEC IPAH ST NAA+NON-PROBE: NOT DETECTED
V CHOL+PARA+VUL DNA STL QL NAA+NON-PROBE: NOT DETECTED
V CHOLERAE DNA STL QL NAA+NON-PROBE: NOT DETECTED
WBC SPEC QL GRAM STN: NORMAL
Y ENTEROCOL DNA STL QL NAA+NON-PROBE: NOT DETECTED

## 2018-10-08 ENCOUNTER — TELEPHONE (OUTPATIENT)
Dept: GASTROENTEROLOGY | Facility: CLINIC | Age: 48
End: 2018-10-08

## 2018-10-08 NOTE — TELEPHONE ENCOUNTER
----- Message from Makenzie Menezes MD sent at 10/5/2018  5:18 PM EDT -----  Gastric biopsies are benign and negative for H pylori  Patient to continue Protonix for couple of months only  Colon biopsies are benign and negative for colitis  Duodenal biopsies are negative for celiac disease  Patient to take Questran as needed  Office visit in couple of months with PA for any persistent symptoms

## 2019-05-06 ENCOUNTER — OFFICE VISIT (OUTPATIENT)
Dept: OBGYN CLINIC | Facility: OTHER | Age: 49
End: 2019-05-06
Payer: COMMERCIAL

## 2019-05-06 ENCOUNTER — APPOINTMENT (OUTPATIENT)
Dept: RADIOLOGY | Facility: OTHER | Age: 49
End: 2019-05-06
Payer: COMMERCIAL

## 2019-05-06 VITALS — HEIGHT: 60 IN | WEIGHT: 123 LBS | BODY MASS INDEX: 24.15 KG/M2

## 2019-05-06 DIAGNOSIS — M79.642 PAIN OF LEFT HAND: ICD-10-CM

## 2019-05-06 DIAGNOSIS — S62.635A CLOSED MALLET FRACTURE OF DISTAL PHALANX OF LEFT RING FINGER: Primary | ICD-10-CM

## 2019-05-06 PROCEDURE — 99203 OFFICE O/P NEW LOW 30 MIN: CPT | Performed by: ORTHOPAEDIC SURGERY

## 2019-05-06 PROCEDURE — 73140 X-RAY EXAM OF FINGER(S): CPT

## 2019-06-11 ENCOUNTER — TRANSCRIBE ORDERS (OUTPATIENT)
Dept: ADMINISTRATIVE | Facility: HOSPITAL | Age: 49
End: 2019-06-11

## 2019-06-11 DIAGNOSIS — E04.1 NONTOXIC UNINODULAR GOITER: Primary | ICD-10-CM

## 2019-06-17 ENCOUNTER — OFFICE VISIT (OUTPATIENT)
Dept: OBGYN CLINIC | Facility: OTHER | Age: 49
End: 2019-06-17
Payer: COMMERCIAL

## 2019-06-17 ENCOUNTER — APPOINTMENT (OUTPATIENT)
Dept: RADIOLOGY | Facility: OTHER | Age: 49
End: 2019-06-17
Payer: COMMERCIAL

## 2019-06-17 VITALS
SYSTOLIC BLOOD PRESSURE: 121 MMHG | HEIGHT: 60 IN | DIASTOLIC BLOOD PRESSURE: 77 MMHG | WEIGHT: 123 LBS | HEART RATE: 72 BPM | BODY MASS INDEX: 24.15 KG/M2

## 2019-06-17 DIAGNOSIS — S62.635A CLOSED MALLET FRACTURE OF DISTAL PHALANX OF LEFT RING FINGER: Primary | ICD-10-CM

## 2019-06-17 DIAGNOSIS — S62.635A CLOSED MALLET FRACTURE OF DISTAL PHALANX OF LEFT RING FINGER: ICD-10-CM

## 2019-06-17 PROCEDURE — 73140 X-RAY EXAM OF FINGER(S): CPT

## 2019-06-17 PROCEDURE — 99213 OFFICE O/P EST LOW 20 MIN: CPT | Performed by: ORTHOPAEDIC SURGERY

## 2019-06-19 ENCOUNTER — HOSPITAL ENCOUNTER (OUTPATIENT)
Dept: ULTRASOUND IMAGING | Facility: HOSPITAL | Age: 49
Discharge: HOME | End: 2019-06-19
Payer: COMMERCIAL

## 2019-06-19 DIAGNOSIS — E04.1 NONTOXIC UNINODULAR GOITER: ICD-10-CM

## 2019-06-19 PROCEDURE — 76536 US EXAM OF HEAD AND NECK: CPT

## 2019-07-30 ENCOUNTER — ANNUAL EXAM (OUTPATIENT)
Dept: OBGYN CLINIC | Facility: CLINIC | Age: 49
End: 2019-07-30
Payer: COMMERCIAL

## 2019-07-30 VITALS
BODY MASS INDEX: 24.54 KG/M2 | HEIGHT: 60 IN | SYSTOLIC BLOOD PRESSURE: 120 MMHG | DIASTOLIC BLOOD PRESSURE: 74 MMHG | WEIGHT: 125 LBS

## 2019-07-30 DIAGNOSIS — Z30.41 ENCOUNTER FOR SURVEILLANCE OF CONTRACEPTIVE PILLS: ICD-10-CM

## 2019-07-30 DIAGNOSIS — Z01.419 GYNECOLOGIC EXAM NORMAL: Primary | ICD-10-CM

## 2019-07-30 DIAGNOSIS — Z12.31 ENCOUNTER FOR SCREENING MAMMOGRAM FOR MALIGNANT NEOPLASM OF BREAST: ICD-10-CM

## 2019-07-30 PROCEDURE — S0612 ANNUAL GYNECOLOGICAL EXAMINA: HCPCS | Performed by: PHYSICIAN ASSISTANT

## 2019-07-30 NOTE — ASSESSMENT & PLAN NOTE
Pap guidelines reviewed  Pap deferred in this low risk patient secondary to negative pap and HPV in 2016  Will plan to continue Necon 0 5/35  Return to office for annual or as needed

## 2019-07-30 NOTE — PROGRESS NOTES
Assessment/Plan   Problem List Items Addressed This Visit        Other    Gynecologic exam normal - Primary     Pap guidelines reviewed  Pap deferred in this low risk patient secondary to negative pap and HPV in 2016  Will plan to continue Necon 0 5/35  Return to office for annual or as needed  Other Visit Diagnoses     Encounter for surveillance of contraceptive pills        Relevant Medications    norethindrone-ethinyl estradiol (Smita Marce 0 5/35, 28,) 0 5-35 MG-MCG per tablet    Encounter for screening mammogram for malignant neoplasm of breast        Relevant Orders    Mammo screening bilateral w 3d & cad          Subjective:     Patient ID: Rochelle Horta is a 51 y o  y o  female  HPI  50 yo seen for annual exam  Currently on Necon 0 5/35 tolerating well would like to continue  Denies bowel or bladder issues  Following with PCP, found thyroid nodule that has enlarged, has biopsy scheduled for tomorrow  Last pap: 7/24/2018 NILM, 3/18/2016 NILM (-)HRHPV  Last mammogram: 7/3/2018 BIRADS 2: Benign  Last colonoscopy: 10/2018  The following portions of the patient's history were reviewed and updated as appropriate:   She  has a past medical history of Disease of thyroid gland, GERD (gastroesophageal reflux disease), and Ulcerative colitis ([facility] [address] )  She   Patient Active Problem List    Diagnosis Date Noted    Gynecologic exam normal 07/30/2019    Closed mallet fracture of distal phalanx of left ring finger 05/06/2019    Diarrhea 09/18/2018    Well woman exam 07/24/2018    Asthma 06/28/2017     She  has a past surgical history that includes Thyroidectomy (Right) and EGD AND COLONOSCOPY (N/A, 10/4/2018)  Her family history includes Breast cancer in her mother; Cancer in her mother; Coronary artery disease in her mother; Diabetes in her maternal grandmother  She  reports that she has never smoked  She has never used smokeless tobacco  She reports that she drinks alcohol   She reports that she does not use drugs  Current Outpatient Medications   Medication Sig Dispense Refill    cholestyramine (QUESTRAN) 4 g packet Take 1 packet (4 g total) by mouth 3 (three) times a day as needed (Diarrhea) (Patient not taking: Reported on 2019) 90 each 3    norethindrone-ethinyl estradiol (Shade Moles 0 5/35, 28,) 0 5-35 MG-MCG per tablet Take 1 tablet by mouth daily 84 tablet 3    pantoprazole (PROTONIX) 40 mg tablet Take 1 tablet (40 mg total) by mouth daily (Patient not taking: Reported on 2019) 30 tablet 3     No current facility-administered medications for this visit  She is allergic to keflex [cephalexin]       Menstrual History:  OB History        1    Para   1    Term   1            AB        Living   1       SAB        TAB        Ectopic        Multiple        Live Births   1                Patient's last menstrual period was 2019 (exact date)  Review of Systems   Constitutional: Negative for fatigue, fever and unexpected weight change  HENT: Negative for dental problem and sinus pressure  Eyes: Negative for visual disturbance  Respiratory: Negative for cough, shortness of breath and wheezing  Cardiovascular: Negative for chest pain  Gastrointestinal: Negative for abdominal pain, blood in stool, constipation, diarrhea, nausea and vomiting  Endocrine: Negative for polydipsia  Genitourinary: Negative for difficulty urinating, dyspareunia, dysuria, frequency, hematuria, pelvic pain and urgency  Musculoskeletal: Negative for arthralgias and back pain  Neurological: Negative for dizziness, seizures, light-headedness and headaches  Psychiatric/Behavioral: Negative for suicidal ideas  The patient is not nervous/anxious          Objective:  Vitals:    19 1654   BP: 120/74   BP Location: Left arm   Patient Position: Sitting   Cuff Size: Standard   Weight: 56 7 kg (125 lb)   Height: 5' (1 524 m)      Physical Exam   Constitutional: She is oriented to person, place, and time  She appears well-developed and well-nourished  Genitourinary: Rectum normal, vagina normal and uterus normal  There is no rash, tenderness, lesion, injury or Bartholin's cyst on the right labia  There is no rash, tenderness, lesion, injury or Bartholin's cyst on the left labia  Vagina exhibits no lesion  No erythema, tenderness or bleeding in the vagina  No signs of injury around the vagina  No vaginal discharge found  Right adnexum does not display mass, does not display tenderness and does not display fullness  Left adnexum does not display mass, does not display tenderness and does not display fullness  Cervix does not exhibit motion tenderness, lesion or discharge  Uterus is not enlarged, tender, exhibiting a mass, irregular (is regular) or mobile  Rectal exam shows no external hemorrhoid, no internal hemorrhoid, no fissure, no mass, no tenderness, anal tone normal and guaiac negative stool  HENT:   Head: Normocephalic and atraumatic  Neck: No thyromegaly present  Cardiovascular: Normal rate, regular rhythm and normal heart sounds  Exam reveals no gallop and no friction rub  No murmur heard  Pulmonary/Chest: Effort normal and breath sounds normal  No respiratory distress  She has no wheezes  Right breast exhibits no inverted nipple, no mass, no nipple discharge, no skin change and no tenderness  Left breast exhibits no inverted nipple, no mass, no nipple discharge, no skin change and no tenderness  No breast swelling, tenderness, discharge or bleeding  Breasts are symmetrical    Abdominal: Soft  She exhibits no distension and no mass  There is no tenderness  There is no rebound and no guarding  No hernia  Lymphadenopathy:     She has no cervical adenopathy  Right: No inguinal adenopathy present  Left: No inguinal adenopathy present  Neurological: She is alert and oriented to person, place, and time  Skin: Skin is warm and dry     Psychiatric: She has a normal mood and affect   Her behavior is normal

## 2019-10-23 ENCOUNTER — HOSPITAL ENCOUNTER (OUTPATIENT)
Dept: RADIOLOGY | Age: 49
Discharge: HOME | End: 2019-10-23
Payer: COMMERCIAL

## 2019-10-23 VITALS — WEIGHT: 125 LBS | HEIGHT: 60 IN | BODY MASS INDEX: 24.54 KG/M2

## 2019-10-23 DIAGNOSIS — Z12.31 ENCOUNTER FOR SCREENING MAMMOGRAM FOR MALIGNANT NEOPLASM OF BREAST: ICD-10-CM

## 2019-10-23 PROCEDURE — 77063 BREAST TOMOSYNTHESIS BI: CPT

## 2019-10-23 PROCEDURE — 77067 SCR MAMMO BI INCL CAD: CPT

## 2019-10-29 ENCOUNTER — TELEPHONE (OUTPATIENT)
Dept: OBGYN CLINIC | Facility: CLINIC | Age: 49
End: 2019-10-29

## 2019-10-29 NOTE — TELEPHONE ENCOUNTER
Patient saw She Cota on her MyChart and wants to clarify what supplemental screening they are referring to   Aware that I was not sure and would review with provider and call her back

## 2019-10-29 NOTE — TELEPHONE ENCOUNTER
The [employer] model calculates the patient's risk of breast cancer based on age, family hx, use of hormones, BMI, etc  If patient risk is high may indicate necessity for genetic consult and BRCA testing  On review of patient chart mother was diagnosed with breast cancer at age 70 which does not increase patient's risk  No other indicators in family's history to have genetic screening or additional testing

## 2020-09-27 ENCOUNTER — APPOINTMENT (OUTPATIENT)
Dept: URGENT CARE | Facility: CLINIC | Age: 50
End: 2020-09-27

## 2020-09-27 ENCOUNTER — TRANSCRIBE ORDERS (OUTPATIENT)
Dept: URGENT CARE | Facility: CLINIC | Age: 50
End: 2020-09-27

## 2020-09-27 DIAGNOSIS — Z13.9 SCREENING FOR UNSPECIFIED CONDITION: ICD-10-CM

## 2020-09-27 DIAGNOSIS — Z13.9 SCREENING FOR UNSPECIFIED CONDITION: Primary | ICD-10-CM

## 2020-09-27 LAB
CHOLEST SERPL-MCNC: 196 MG/DL (ref 50–200)
EST. AVERAGE GLUCOSE BLD GHB EST-MCNC: 105 MG/DL
HBA1C MFR BLD: 5.3 %
HDLC SERPL-MCNC: 73 MG/DL
LDLC SERPL CALC-MCNC: 109 MG/DL (ref 0–100)
NONHDLC SERPL-MCNC: 123 MG/DL
TRIGL SERPL-MCNC: 69 MG/DL

## 2020-09-27 PROCEDURE — 80061 LIPID PANEL: CPT

## 2020-09-27 PROCEDURE — 83036 HEMOGLOBIN GLYCOSYLATED A1C: CPT

## 2020-11-11 ENCOUNTER — TRANSCRIBE ORDERS (OUTPATIENT)
Dept: ADMINISTRATIVE | Facility: HOSPITAL | Age: 50
End: 2020-11-11

## 2020-11-11 DIAGNOSIS — Z12.31 ENCOUNTER FOR SCREENING MAMMOGRAM FOR MALIGNANT NEOPLASM OF BREAST: Primary | ICD-10-CM

## 2020-12-10 ENCOUNTER — ANNUAL EXAM (OUTPATIENT)
Dept: OBGYN CLINIC | Facility: CLINIC | Age: 50
End: 2020-12-10
Payer: COMMERCIAL

## 2020-12-10 VITALS
DIASTOLIC BLOOD PRESSURE: 78 MMHG | WEIGHT: 121 LBS | BODY MASS INDEX: 23.75 KG/M2 | HEIGHT: 60 IN | SYSTOLIC BLOOD PRESSURE: 118 MMHG

## 2020-12-10 DIAGNOSIS — Z01.419 ROUTINE GYNECOLOGICAL EXAMINATION: Primary | ICD-10-CM

## 2020-12-10 PROBLEM — Z87.19 HX OF ORAL APHTHOUS ULCERS: Status: ACTIVE | Noted: 2020-04-01

## 2020-12-10 PROBLEM — Z12.39 ENCOUNTER FOR SCREENING FOR MALIGNANT NEOPLASM OF BREAST: Status: ACTIVE | Noted: 2018-07-24

## 2020-12-10 PROBLEM — Z30.40 ENCOUNTER FOR SURVEILLANCE OF CONTRACEPTIVES: Status: ACTIVE | Noted: 2020-12-10

## 2020-12-10 PROCEDURE — 87624 HPV HI-RISK TYP POOLED RSLT: CPT | Performed by: PHYSICIAN ASSISTANT

## 2020-12-10 PROCEDURE — S0612 ANNUAL GYNECOLOGICAL EXAMINA: HCPCS | Performed by: PHYSICIAN ASSISTANT

## 2020-12-10 PROCEDURE — G0145 SCR C/V CYTO,THINLAYER,RESCR: HCPCS | Performed by: PHYSICIAN ASSISTANT

## 2020-12-10 RX ORDER — TRIAMCINOLONE ACETONIDE 1 MG/G
CREAM TOPICAL
COMMUNITY
Start: 2020-11-10 | End: 2021-12-14

## 2020-12-12 LAB
HPV HR 12 DNA CVX QL NAA+PROBE: NEGATIVE
HPV16 DNA CVX QL NAA+PROBE: NEGATIVE
HPV18 DNA CVX QL NAA+PROBE: NEGATIVE

## 2020-12-15 LAB
LAB AP GYN PRIMARY INTERPRETATION: NORMAL
LAB AP LMP: NORMAL
Lab: NORMAL

## 2021-01-28 ENCOUNTER — NURSE TRIAGE (OUTPATIENT)
Dept: OTHER | Facility: OTHER | Age: 51
End: 2021-01-28

## 2021-01-28 DIAGNOSIS — Z20.828 SARS-ASSOCIATED CORONAVIRUS EXPOSURE: Primary | ICD-10-CM

## 2021-01-28 NOTE — TELEPHONE ENCOUNTER
[facility] TEST REQUEST  ----- Message from Gerard Horne sent at 2021  9:32 AM EST -----  "I need to be tested due to exposed to someone who tested positive, I've been feeling sluggish   Fatigue and headache "

## 2021-01-28 NOTE — TELEPHONE ENCOUNTER
1  Were you within 6 feet or less, for up to 15 minutes or more with a person that has a confirmed COVID-19 test?    Exposed to a team mate of her son  Had close contact during senior night for > 15 minutes  2  What was the date of your exposure? 1/25/2021  3  Are you experiencing any symptoms attributed to the virus?  (Assess for SOB, cough, fever, difficulty breathing)   Started 1/27/2021  Feeling sluggish  Headache  Denies fever, cough or SOB  4  HIGH RISK: Do you have any history heart or lung conditions, weakened immune system, diabetes, Asthma, CHF, HIV, COPD, Chemo, renal failure, sickle cell, etc?    Lupus  5  PREGNANCY: Are you pregnant or did you recently give birth?    Denied

## 2021-01-30 DIAGNOSIS — Z20.828 SARS-ASSOCIATED CORONAVIRUS EXPOSURE: ICD-10-CM

## 2021-01-30 PROCEDURE — U0003 INFECTIOUS AGENT DETECTION BY NUCLEIC ACID (DNA OR RNA); SEVERE ACUTE RESPIRATORY SYNDROME CORONAVIRUS 2 (SARS-COV-2) (CORONAVIRUS DISEASE [COVID-19]), AMPLIFIED PROBE TECHNIQUE, MAKING USE OF HIGH THROUGHPUT TECHNOLOGIES AS DESCRIBED BY CMS-2020-01-R: HCPCS | Performed by: FAMILY MEDICINE

## 2021-01-30 PROCEDURE — U0005 INFEC AGEN DETEC AMPLI PROBE: HCPCS | Performed by: FAMILY MEDICINE

## 2021-01-31 LAB — SARS-COV-2 RNA RESP QL NAA+PROBE: NEGATIVE

## 2021-03-10 DIAGNOSIS — Z23 ENCOUNTER FOR IMMUNIZATION: ICD-10-CM

## 2021-03-16 ENCOUNTER — IMMUNIZATIONS (OUTPATIENT)
Dept: FAMILY MEDICINE CLINIC | Facility: HOSPITAL | Age: 51
End: 2021-03-16

## 2021-03-16 DIAGNOSIS — Z23 ENCOUNTER FOR IMMUNIZATION: Primary | ICD-10-CM

## 2021-03-16 PROCEDURE — 91300 SARS-COV-2 / COVID-19 MRNA VACCINE (PFIZER-BIONTECH) 30 MCG: CPT

## 2021-03-16 PROCEDURE — 0001A SARS-COV-2 / COVID-19 MRNA VACCINE (PFIZER-BIONTECH) 30 MCG: CPT

## 2021-03-22 ENCOUNTER — HOSPITAL ENCOUNTER (OUTPATIENT)
Dept: MAMMOGRAPHY | Facility: HOSPITAL | Age: 51
Discharge: HOME | End: 2021-03-22
Attending: PHYSICIAN ASSISTANT
Payer: COMMERCIAL

## 2021-03-22 VITALS — HEIGHT: 60 IN | WEIGHT: 121 LBS | BODY MASS INDEX: 23.75 KG/M2

## 2021-03-22 DIAGNOSIS — Z12.31 ENCOUNTER FOR SCREENING MAMMOGRAM FOR MALIGNANT NEOPLASM OF BREAST: ICD-10-CM

## 2021-03-22 PROCEDURE — 77063 BREAST TOMOSYNTHESIS BI: CPT

## 2021-03-22 PROCEDURE — 77067 SCR MAMMO BI INCL CAD: CPT

## 2021-03-25 ENCOUNTER — NURSE TRIAGE (OUTPATIENT)
Dept: OTHER | Facility: OTHER | Age: 51
End: 2021-03-25

## 2021-03-25 DIAGNOSIS — Z11.9 ENCOUNTER FOR SCREENING FOR INFECTIOUS AND PARASITIC DISEASES, UNSPECIFIED: Primary | ICD-10-CM

## 2021-03-25 NOTE — TELEPHONE ENCOUNTER
Pt is requesting a covid test and does not have a [facility]'s PCP  Reports having an out of network PCP  Order placed  Pt informed of closest testing site and was advised of hours of operation, address, to wear a mask, and to stay in the car  Pt verbalized understanding  Pt already has a [employer] account to check for results  Advised to begin checking in 2-3 days after testing is completed  Reason for Disposition   [1] CLOSE CONTACT COVID-19 EXPOSURE within last 14 days AND [2] NO symptoms    Answer Assessment - Initial Assessment Questions  Were you within 6 feet or less, for up to 15 minutes or more with a person that has a confirmed COVID-19 test? Yes     What was the date of your exposure? "I was around her Monday and Tuesday " 3/22-23     Are you experiencing any symptoms attributed to the virus?  (Assess for SOB, cough, fever, difficulty breathing) "I had a little diarrhea the other day  It's hard to tell for me since I usually have some stomach issues "     HIGH RISK: Do you have any history heart or lung conditions, weakened immune system, diabetes, Asthma, CHF, HIV, COPD, Chemo, renal failure, sickle cell, etc? "They think it's Lupus "     PREGNANCY: Are you pregnant or did you recently give birth?  N/A    Protocols used: CORONAVIRUS (COVID-19) EXPOSURE-ADULT-AH

## 2021-03-25 NOTE — TELEPHONE ENCOUNTER
Regarding:  COVID-19 Exposed  ----- Message from Sahara Franks sent at 3/25/2021  2:00 PM EDT -----  "I was exposed to someone who tested COVID-19 positive "

## 2021-03-27 DIAGNOSIS — Z11.9 ENCOUNTER FOR SCREENING FOR INFECTIOUS AND PARASITIC DISEASES, UNSPECIFIED: ICD-10-CM

## 2021-03-27 LAB — SARS-COV-2 RNA RESP QL NAA+PROBE: NEGATIVE

## 2021-03-27 PROCEDURE — U0005 INFEC AGEN DETEC AMPLI PROBE: HCPCS | Performed by: FAMILY MEDICINE

## 2021-03-27 PROCEDURE — U0003 INFECTIOUS AGENT DETECTION BY NUCLEIC ACID (DNA OR RNA); SEVERE ACUTE RESPIRATORY SYNDROME CORONAVIRUS 2 (SARS-COV-2) (CORONAVIRUS DISEASE [COVID-19]), AMPLIFIED PROBE TECHNIQUE, MAKING USE OF HIGH THROUGHPUT TECHNOLOGIES AS DESCRIBED BY CMS-2020-01-R: HCPCS | Performed by: FAMILY MEDICINE

## 2021-04-07 ENCOUNTER — IMMUNIZATIONS (OUTPATIENT)
Dept: FAMILY MEDICINE CLINIC | Facility: HOSPITAL | Age: 51
End: 2021-04-07

## 2021-04-07 DIAGNOSIS — Z23 ENCOUNTER FOR IMMUNIZATION: Primary | ICD-10-CM

## 2021-04-07 PROCEDURE — 0002A SARS-COV-2 / COVID-19 MRNA VACCINE (PFIZER-BIONTECH) 30 MCG: CPT

## 2021-04-07 PROCEDURE — 91300 SARS-COV-2 / COVID-19 MRNA VACCINE (PFIZER-BIONTECH) 30 MCG: CPT

## 2021-07-23 ENCOUNTER — CONSULT (OUTPATIENT)
Dept: DERMATOLOGY | Facility: CLINIC | Age: 51
End: 2021-07-23
Payer: COMMERCIAL

## 2021-07-23 ENCOUNTER — TELEPHONE (OUTPATIENT)
Dept: DERMATOLOGY | Facility: CLINIC | Age: 51
End: 2021-07-23

## 2021-07-23 VITALS — BODY MASS INDEX: 22.45 KG/M2 | WEIGHT: 122 LBS | HEIGHT: 62 IN | TEMPERATURE: 99 F

## 2021-07-23 DIAGNOSIS — L51.9 ERYTHEMA MULTIFORME: ICD-10-CM

## 2021-07-23 DIAGNOSIS — D48.5 NEOPLASM OF UNCERTAIN BEHAVIOR OF SKIN: Primary | ICD-10-CM

## 2021-07-23 DIAGNOSIS — R21 RASH AND NONSPECIFIC SKIN ERUPTION: Primary | ICD-10-CM

## 2021-07-23 PROCEDURE — 88346 IMFLUOR 1ST 1ANTB STAIN PX: CPT | Performed by: DERMATOLOGY

## 2021-07-23 PROCEDURE — 88350 IMFLUOR EA ADDL 1ANTB STN PX: CPT | Performed by: STUDENT IN AN ORGANIZED HEALTH CARE EDUCATION/TRAINING PROGRAM

## 2021-07-23 PROCEDURE — 11104 PUNCH BX SKIN SINGLE LESION: CPT | Performed by: DERMATOLOGY

## 2021-07-23 PROCEDURE — 88312 SPECIAL STAINS GROUP 1: CPT | Performed by: STUDENT IN AN ORGANIZED HEALTH CARE EDUCATION/TRAINING PROGRAM

## 2021-07-23 PROCEDURE — 88305 TISSUE EXAM BY PATHOLOGIST: CPT | Performed by: STUDENT IN AN ORGANIZED HEALTH CARE EDUCATION/TRAINING PROGRAM

## 2021-07-23 PROCEDURE — 11105 PUNCH BX SKIN EA SEP/ADDL: CPT | Performed by: DERMATOLOGY

## 2021-07-23 PROCEDURE — 88300 SURGICAL PATH GROSS: CPT | Performed by: STUDENT IN AN ORGANIZED HEALTH CARE EDUCATION/TRAINING PROGRAM

## 2021-07-23 PROCEDURE — 99244 OFF/OP CNSLTJ NEW/EST MOD 40: CPT | Performed by: DERMATOLOGY

## 2021-07-23 PROCEDURE — 88313 SPECIAL STAINS GROUP 2: CPT | Performed by: STUDENT IN AN ORGANIZED HEALTH CARE EDUCATION/TRAINING PROGRAM

## 2021-07-23 PROCEDURE — 1036F TOBACCO NON-USER: CPT | Performed by: DERMATOLOGY

## 2021-07-23 RX ORDER — HYDROXYCHLOROQUINE SULFATE 200 MG/1
200 TABLET, FILM COATED ORAL 2 TIMES DAILY
COMMUNITY
Start: 2021-05-27 | End: 2021-11-04 | Stop reason: SDUPTHER

## 2021-07-23 RX ORDER — DEXAMETHASONE 0.5 MG/5ML
ELIXIR ORAL
COMMUNITY
Start: 2021-07-15 | End: 2022-06-01

## 2021-07-23 RX ORDER — CLOBETASOL PROPIONATE 0.05 MG/G
GEL TOPICAL
COMMUNITY
Start: 2021-06-24 | End: 2022-05-31 | Stop reason: ALTCHOICE

## 2021-07-23 RX ORDER — VALACYCLOVIR HYDROCHLORIDE 500 MG/1
500 TABLET, FILM COATED ORAL 2 TIMES DAILY
COMMUNITY
Start: 2021-07-19 | End: 2021-12-14

## 2021-07-23 NOTE — PROGRESS NOTES
[facility] Note     Patient Name: Garr Severe  Encounter Date: 7/23/2021     Have you been cared for by a [facility] Dermatologist in the last 3 years and, if so, which one? No    · Have you traveled outside of the [address] [place] [address] in the past 3 months or outside of the [facility] area in the last 2 weeks? No     May we call your Preferred Phone number to discuss your specific medical information? Yes     May we leave a detailed message that includes your specific medical information? Yes      Today's Chief Concerns:   Concern #1:  Red/blister spots on lower legs and arms  Painful spots  Past Medical History:  Have you personally ever had or currently have any of the following? · Skin cancer (such as Melanoma, Basal Cell Carcinoma, Squamous Cell Carcinoma? (If Yes, please provide more detail)- No  · Eczema: No  · Psoriasis: No  · HIV/AIDS: No  · Hepatitis B or C: No  · Tuberculosis: No  · Systemic Immunosuppression such as Diabetes, Biologic or Immunotherapy, Chemotherapy, Organ Transplantation, Bone Marrow Transplantation (If YES, please provide more detail): No  · Radiation Treatment (If YES, please provide more detail): No  · Any other major medical conditions/concerns? 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 Family History:  Have any of your "first degree relatives" (parent, brother, sister, or child) had any of the following       · Skin cancer such as Melanoma or Merkel Cell Carcinoma or Pancreatic Cancer? YES, Mother hx of melanoma   · Eczema, Asthma, Hay Fever or Seasonal Allergies: No  · Psoriasis or Psoriatic Arthritis: No  · Do any other medical conditions seem to run in your family? If Yes, what condition and which relatives? No    Current Medications:         Current Outpatient Medications:     Cholecalciferol (VITAMIN D3 PO), Take by mouth, Disp: , Rfl:     Cyanocobalamin (VITAMIN B12 PO), Take by mouth, Disp: , Rfl:     hydroxychloroquine (PLAQUENIL) 200 mg tablet, Take 200 mg by mouth 2 (two) times a day, Disp: , Rfl:     valACYclovir (VALTREX) 500 mg tablet, Take 500 mg by mouth 2 (two) times a day, Disp: , Rfl:     triamcinolone (KENALOG) 0 1 % cream, APPLY BID TO RASH UP TO 2 WEEKS PER MONTH AS NEEDED (Patient not taking: Reported on 7/23/2021), Disp: , Rfl:       Review of Systems:  Have you recently had or currently have any of the following? If YES, what are you doing for the problem? · Fever, chills or unintended weight loss: No  · Sudden loss or change in your vision: No  · Nausea, vomiting or blood in your stool: No  · Painful or swollen joints: No  · Wheezing or cough: No  · Changing mole or non-healing wound: YES, Lower legs and arms   · Nosebleeds: No  · Excessive sweating: No  · Easy or prolonged bleeding? No  · Over the last 2 weeks, how often have you been bothered by the following problems? · Taking little interest or pleasure in doing things: 1 - Not at All  · Feeling down, depressed, or hopeless: 1 - Not at All  · Rapid heartbeat with epinephrine:  No    · FEMALES ONLY:    · Are you pregnant or planning to become pregnant? No  · Are you currently or planning to be nursing or breast feeding? No    · Any known allergies? Allergies   Allergen Reactions    Doxycycline Rash    Keflex [Cephalexin] Rash         Physical Exam:     Was a chaperone (Derm Clinical Assistant) present throughout the entire Physical Exam? Yes     Did the Dermatology Team specifically  the patient on the importance of a Full Skin Exam to be sure that nothing is missed clinically?  Yes}  o Did the patient ultimately request or accept a Full Skin Exam?  NO      CONSTITUTIONAL:   Vitals:    07/23/21 1612   Temp: 99 °F (37 2 °C)   TempSrc: Temporal   Weight: 55 3 kg (122 lb)   Height: 5' 2" (1 575 m)         PSYCH: Normal mood and affect  EYES: Normal conjunctiva  ENT: Normal lips and oral mucosa  CARDIOVASCULAR: No edema  RESPIRATORY: Normal respirations  HEME/LYMPH/IMMUNO:  No regional lymphadenopathy except as noted below in "ASSESSMENT AND PLAN BY DIAGNOSIS"    SKIN:  FULL ORGAN SYSTEM EXAM   Hair, Scalp, Ears, Face Normal except as noted below in Assessment   Neck, Cervical Chain Nodes Normal except as noted below in Assessment   Right Arm/Hand/Fingers Normal except as noted below in Assessment   Left Arm/Hand/Fingers Normal except as noted below in Assessment   Back/Spine Normal except as noted below in Assessment   Groin/Genitalia/Buttocks NOT EXAMINED   Right Leg, Foot, Toes Normal except as noted below in Assessment   Left Leg, Foot, Toes Normal except as noted below in Assessment        Assessment and Plan by Diagnosis:    History of Present Condition:     Duration:  How long has this been an issue for you?    o  A year    Location Affected:  Where on the body is this affecting you?    o  Extremities ( Below the knees/lower legs, arms)    Quality:  Is there any bleeding, pain, itch, burning/irritation, or redness associated with the skin lesion? o  Redness, pain, blister    Severity:  Describe any bleeding, pain, itch, burning/irritation, or redness on a scale of 1 to 10 (with 10 being the worst)  o  8   Timing:  Does this condition seem to be there pretty constantly or do you notice it more at specific times throughout the day?    o  Intermittent ( she can be clear for 3 weeks then the lesions start to appear again)    Context:  Have you ever noticed that this condition seems to be associated with specific activities you do?    o  Unknown    Modifying Factors:    o Anything that seems to make the condition worse?    -  Unknown   o What have you tried to do to make the condition better?     -  Steroids ( Had prednisone twice already) Last time was on May 2021   - Valacyclovir 500 mg  - Tried Triamcinolone 0 1% cream  - Tried Clobetasol 0 05% cream twice a day ( use it this morning )  on legs      Associated Signs and Symptoms:  Does this skin lesion seem to be associated with any of the following:  o  SL AMB DERM SIGNS AND SYMPTOMS: Redness     NEOPLASM OF UNCERTAIN BEHAVIOR OF SKIN    Physical Exam:   (Anatomic Location); (Size and Morphological Description); (Differential Diagnosis):  o Specimen A: Right lower leg; punch biopsy; 49 y o  Female with a one year history of red plaques with overlying vesicles on legs and arms, made worse by the sun, with intermittent oral ulcers DDX: pemphigus vegetans vs pemphigus erythematosus vs bullous lupus  o   o Specimen B: Right thigh; punch biopsy; 49 y o  Female with a one year history of red plaques with overlying vesicles on legs and arms, made worse by the sun, with intermittent oral ulcers DDX: pemphigus vegetans vs pemphigus erythematosus vs bullous lupus  o   o Specimen C: Right thigh; punch biopsy; 49 y o  Female with a one year history of red plaques with overlying vesicles on legs and arms, made worse by the sun, with intermittent oral ulcers DDX: pemphigus vegetans vs pemphigus erythematosus vs bullous lupus- SEND FOR DIRECT IMMUNOFLUORESCENCE      Additional History of Present Condition:  Patient states she had a positive NOEMI in the past  Has been following with a rheumatologist who started her on plaquenil with no improvement  Has had multiple biopsies in the past  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   Patient pulled up some of her previous lab work and no DIF could be seen and otherwise, anti-RF, complements, ANCAs, ds DNA, anti-histone negative; only positive was NOEMI 1:80;     Assessment and Plan:   I have discussed with the patient that a sample of skin via a "skin biopsy would be potentially helpful to further make a specific diagnosis under the microscope   Based on a thorough discussion of this condition and the management approach to it (including a comprehensive discussion of the known risks, side effects and potential benefits of treatment), the patient (family) agrees to implement the following specific plan:    o Procedure:  Skin Biopsy  After a thorough discussion of treatment options and risk/benefits/alternatives (including but not limited to local pain, scarring, dyspigmentation, blistering, possible superinfection, and inability to confirm a diagnosis via histopathology), verbal and written consent were obtained and portion of the rash was biopsied for tissue sample  See below for consent that was obtained from patient and subsequent Procedure Note  PROCEDURE NOTE:  PUNCH BIOPSY      Performing Physician: Dr Becky Lugo supervised by Dr Macy Santamaria    Anatomic Location; Clinical Description with size (cm); Pre-Op Diagnosis:    Specimen A: Right lower leg; punch biopsy; 49 y o  Female with a one year history of red plaques with overlying vesicles on legs and arms, made worse by the sun, with intermittent oral ulcers DDX: pemphigus vegetans vs pemphigus erythematosus vs bullous lupus      Specimen B: Right thigh; punch biopsy; 49 y o  Female with a one year history of red plaques with overlying vesicles on legs and arms, made worse by the sun, with intermittent oral ulcers DDX: pemphigus vegetans vs pemphigus erythematosus vs bullous lupus      Specimen C: Right thigh; punch biopsy; 49 y o  Female with a one year history of red plaques with overlying vesicles on legs and arms, made worse by the sun, with intermittent oral ulcers DDX: pemphigus vegetans vs pemphigus erythematosus vs bullous lupus- SEND FOR DIRECT IMMUNOFLUORESCENCE         Anesthesia: 1% xylocaine with epi       Topical anesthesia: None       Indications: To indicate diagnosis and management plan      Procedure Details     Patient informed of the risks (including bleeding,scaring and infection) and benefits of the procedure explained  Verbal and written informed consent obtained  The area was prepped and draped in the usual fashion  Anesthesia was obtained with 1% lidocaine with epinephrine  The skin was then stretched perpendicular to the skin tension lines and a punch biopsy to an appropriate sampling depth was obtained with a 3 mm punch with a forceps and iris scissors  Hemostasis was obtained with Gelfoam     Complications:  None      Specimen has been sent for review by Dermatopathology  Plan:  1  Instructed to keep the wound dry and covered for 24-48h and clean thereafter  2  Warning signs of infection were reviewed  3  Recommended that the patient use acetaminophen as needed for pain  4  Follow up in 2 weeks with Dr Marky Narvaez or Dr Rochelle Thompson will email David Hair to schedule patient  Instructed patient to apply mupirocin ointment twice daily to the biopsy sites until patient returns for follow-up      Standard post-procedure care has been explained and has been included in written form within the patient's copy of Informed Consent        Scribe Attestation    I,:  Hayley Hendrix am acting as a scribe while in the presence of the attending physician :       I,:  Latonia Leija MD personally performed the services described in this documentation    as scribed in my presence :

## 2021-07-23 NOTE — PATIENT INSTRUCTIONS
Specimen has been sent for review by Dermatopathology  Plan:  1  Instructed to keep the wound dry and covered for 24-48h and clean thereafter  2  Warning signs of infection were reviewed  3  Recommended that the patient use acetaminophen as needed for pain  4  Follow up in 2 weeks with Dr Angelito Reynolds or Dr Myrick Cushing Dr Myrick Cushing will email Andra Tejeda to schedule patient  Standard post-procedure care has been explained and has been included in written form within the patient's copy of Informed Consent

## 2021-07-24 NOTE — PROGRESS NOTES
Ordered  and 230 and Desmoglein 1 and 2 for patient after clinic visit  During clinic visit, did let patient know that I ordered some blood work for her and to get it done on Monday  Patient was agreeable      Abdifatah Chu  Dermatology PGY-3 Resident Physician

## 2021-07-29 NOTE — RESULT ENCOUNTER NOTE
DERMATOPATHOLOGY RESULT NOTE    Results reviewed by ordering physician  Called patient to personally discuss results  Discussed results with patient  Instructions for Clinical Derm Team:   (remember to route Result Note to appropriate staff):    None    Result & Plan by Specimen:    Specimen A:discussed prelimary concerns  Plan: await immuno before treatment plan      Specimen B: preliminary concerns discussed  Plan: await immuno      Specimen C:preliminary  Plan: preliminary    Final Diagnosis  A  Skin, right lower leg, punch biopsy:     Diffuse epidermal necrosis, focal vacuolar interface dermatitis, and superficial perivascular lymphocytic infiltrate with rare eosinophils (see note)              B  Skin, right thigh, punch biopsy:     Focal vacuolar interface dermatitis with superficial and mid-dermal perivascular/periadnexal lymphocytic infiltrate (see note)              C  Skin, right thigh, DIF:     Specimen entirely sent to [facility] for immunofluorescence analysis  A separate report will be issued by [facility] following completion of their studies                Note:  The histopathologic findings in specimen A are consistent with an immune-mediated cytotoxic reaction (e g , erythema multiforme, Lesly Villarreal syndrome/toxic epidermal necrolysis, acute presentations of connective tissue disease)  The histopathologic findings in specimen B are relatively nonspecific but can be compatible with a connective tissue disease in the appropriate clinical context  Taken together, assuming the histopathologic findings from both specimens represent the same disease process, 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

## 2021-07-30 ENCOUNTER — APPOINTMENT (OUTPATIENT)
Dept: LAB | Facility: CLINIC | Age: 51
End: 2021-07-30
Payer: COMMERCIAL

## 2021-07-30 DIAGNOSIS — R21 RASH AND NONSPECIFIC SKIN ERUPTION: ICD-10-CM

## 2021-07-30 PROCEDURE — 36415 COLL VENOUS BLD VENIPUNCTURE: CPT

## 2021-07-30 PROCEDURE — 83516 IMMUNOASSAY NONANTIBODY: CPT

## 2021-08-02 LAB — MISCELLANEOUS LAB TEST RESULT: NORMAL

## 2021-08-03 ENCOUNTER — TELEPHONE (OUTPATIENT)
Dept: DERMATOLOGY | Facility: CLINIC | Age: 51
End: 2021-08-03

## 2021-08-04 NOTE — TELEPHONE ENCOUNTER
Kenyetta Walker,    I will tell Fidencio Portillo to call her and add her on  Thanks!    Scotty Figueroa
Patient left voicemail stating she was told she would be having a follow up office visit this Friday, 8/6/21 with Dr Jalil Montiel and Dr Malcolm Terrazas but she hasn't heard anything and was told to call  I do not see that there is any upcoming appointments made for patient 
Spoke to pt regarding an appt she states she is supposed to have on Friday at [facility]  She states she was told she should have a follow up visit with Dr Becky Lugo on 8/5 to go over biopsy results and that she was going to be 'squeezed in'  Pt states she also has a new blister on her leg that is red and very painful that she would like looked at  I told the pt that Dr Lily Wells schedule is not open for 8/5 and I can not schedule her on that date  I assured her I would follow up with the doctor to find out how she would like to proceed and I will call the pt back 
No

## 2021-08-06 ENCOUNTER — OFFICE VISIT (OUTPATIENT)
Dept: DERMATOLOGY | Facility: CLINIC | Age: 51
End: 2021-08-06
Payer: COMMERCIAL

## 2021-08-06 VITALS — WEIGHT: 119.1 LBS | BODY MASS INDEX: 21.92 KG/M2 | HEIGHT: 62 IN | TEMPERATURE: 97.8 F

## 2021-08-06 DIAGNOSIS — R21 RASH AND NONSPECIFIC SKIN ERUPTION: Primary | ICD-10-CM

## 2021-08-06 PROCEDURE — 3008F BODY MASS INDEX DOCD: CPT | Performed by: DERMATOLOGY

## 2021-08-06 PROCEDURE — 99213 OFFICE O/P EST LOW 20 MIN: CPT | Performed by: DERMATOLOGY

## 2021-08-06 NOTE — PATIENT INSTRUCTIONS
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

## 2021-08-06 NOTE — PROGRESS NOTES
[facility] Follow Up Note    Patient Name: Ethan Parry  Encounter Date: 8/6/2021    Today's Chief Concerns:  Mercedes Concern #1:  Lesions on lower legs and arms, painful, follow up       Current Medications:    Current Outpatient Medications:     Cholecalciferol (VITAMIN D3 PO), Take by mouth, Disp: , Rfl:     clobetasol (TEMOVATE) 0 05 % GEL, APPLY LOCALLY 4 TO 6 TIMES DAILY  DO NOT EXCEED 2 WEEKS, Disp: , Rfl:     Cyanocobalamin (VITAMIN B12 PO), Take by mouth, Disp: , Rfl:     dexamethasone 0 5 MG/5ML elixir, RINSE WITH 1 TEASPOONFUL FOR 2 MINUTES 2-4 TIMES A DAY  THEN SPIT OUT , Disp: , Rfl:     hydroxychloroquine (PLAQUENIL) 200 mg tablet, Take 200 mg by mouth 2 (two) times a day, Disp: , Rfl:     mupirocin (BACTROBAN) 2 % ointment, Apply twice daily topically to the biopsy sites starting 48 hours post-biopsy, Disp: 22 g, Rfl: 1    valACYclovir (VALTREX) 500 mg tablet, Take 500 mg by mouth 2 (two) times a day, Disp: , Rfl:     triamcinolone (KENALOG) 0 1 % cream, APPLY BID TO RASH UP TO 2 WEEKS PER MONTH AS NEEDED (Patient not taking: Reported on 7/23/2021), Disp: , Rfl:     CONSTITUTIONAL:   Vitals:    08/06/21 1308   Temp: 97 8 °F (36 6 °C)   TempSrc: Tympanic   Weight: 54 kg (119 lb 1 6 oz)   Height: 5' 2" (1 575 m)         Specific Alerts:    Have you been seen by a [facility] Dermatologist in the last 3 years? YES    Are you pregnant or planning to become pregnant? No    Are you currently or planning to be nursing or breast feeding? No    Allergies   Allergen Reactions    Doxycycline Rash    Keflex [Cephalexin] Rash       May we call your Preferred Phone number to discuss your specific medical information? YES    May we leave a detailed message that includes your specific medical information? YES    Have you traveled outside of the [facility] in the past 3 months? No    Do you currently have a pacemaker or defibrillator?  No    Do you have any artificial heart valves, joints, plates, screws, rods, stents, pins, etc? No   - If Yes, were any placed within the last 2 years? Do you require any medications prior to a surgical procedure? No      Are you taking any medications that cause you to bleed more easily ("blood thinners") No    Have you ever experienced a rapid heartbeat with epinephrine? No    Have you ever been treated with "gold" (gold sodium thiomalate) therapy? No    Review of Systems:  Have you recently had or currently have any of the following? · Fever or chills: No  · Night Sweats: No  · Headaches: No  · Weight Gain: No  · Weight Loss: No  · Blurry Vision: No  · Nausea: No  · Vomiting: No  · Diarrhea: No  · Blood in Stool: No  · Abdominal Pain: No  · Itchy Skin: No  · Painful Joints: No  · Swollen Joints: No  · Muscle Pain: No  · Irregular Mole: No  · Sun Burn: No  · Dry Skin: YES  · Skin Color Changes: No  · Scar or Keloid: No  · Cold Sores/Fever Blisters: No  · Bacterial Infections/MRSA: No  · Anxiety: No  · Depression: No  · Suicidal or Homicidal Thoughts: No      PSYCH: Normal mood and affect  EYES: Normal conjunctiva  ENT: Normal lips and oral mucosa  CARDIOVASCULAR: No edema  RESPIRATORY: Normal respirations  HEME/LYMPH/IMMUNO:  No regional lymphadenopathy except as noted below in ASSESSMENT AND PLAN BY DIAGNOSIS    FULL ORGAN SYSTEM SKIN EXAM (SKIN)  Right Leg, Foot, Toes Normal except as noted below in Assessment   Left Leg, Foot, Toes Normal except as noted below in Assessment       MIRZA SYNDROME     Physical Exam:   Anatomic Location Affected:  Bilateral lower legs and bilateral arms    Morphological Description:  Red circular plaques with central darker red colored area (atypical targetoid plaques)    Additional History of Present Condition:  The patient states that she has been inconsistently taking plaquenil 200mg twice daily for the last 3 months- has sometimes only been taking it once a day (started by Rheumatologist Dr Judy Guerrero)  Patient states she had a positive NOEMI in the past  Has been following with a rheumatologist who started her on plaquenil with no improvement  Has had multiple biopsies in the past  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  Patient pulled up some of her previous lab work and no DIF could be seen and otherwise, anti-RF, complements, ANCAs, ds DNA, anti-histone negative; only positive was NOEMI 1:80;     Final Diagnosis   A  Skin, right lower leg, punch biopsy:     Diffuse epidermal necrosis, focal vacuolar interface dermatitis, and superficial perivascular lymphocytic infiltrate with rare eosinophils (see note)              B  Skin, right thigh, punch biopsy:     Focal vacuolar interface dermatitis with superficial and mid-dermal perivascular/periadnexal lymphocytic infiltrate (see note)              C  Skin, right thigh, DIF:     Specimen entirely sent to [facility] for immunofluorescence analysis  A separate report will be issued by [facility] following completion of their studies                Note:  The histopathologic findings in specimen A are consistent with an immune-mediated cytotoxic reaction (e g , erythema multiforme, [place] syndrome/toxic epidermal necrolysis, acute presentations of connective tissue disease)  The histopathologic findings in specimen B are relatively nonspecific but can be compatible with a connective tissue disease in the appropriate clinical context  Taken together, assuming the histopathologic findings from both specimens represent the same disease process, 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, though the histopathologic differential diagnosis includes erythema multiforme and an acute presentation of a connective tissue disease  An autoimmune blistering disorder cannot be excluded  Clinical pathological correlation, including immunofluorescence studies, is advised   Multiple levels examined  Pathogenic microorganisms are not seen on PAS stain  Dermal mucin deposition is not significantly increased, as seen on colloidal iron stain  DIF was negative  Assessment and Plan:  Based on a thorough discussion of this condition and the management approach to it (including a comprehensive discussion of the known risks, side effects and potential benefits of treatment), the patient (family) agrees to implement the following specific plan:     Discussed we will call with pending blood work results (BP antigens and desmoglein pending)   Discussed Andre Syndrome and its treatment options  Aetyoli First would like patient to consistently take plaquenil 200mg twice daily for 3 months to see (states she already had an eye exam before  Dr Carleen Maguire would start her on the plaquenil)   Instructed patient to have regular follow-up with Dr Carleen Maguire   If there is no improvement, will consider placing her on dapsone 25mg once daily in addition to her plaquenil  Ordered a G6PD level,  CBC, and liver function test preemptively   The lab monitoring for dapsone would be after one week of starting, and then monthly for the first three months (CBC, LFTs)   Offered a steroid injection on left lower leg, red plaques, but she declined   Follow up in 3-4 months  Patient was schedule November 1, 2021 at 8 am ( Dr Leila Masters [facility])  Scribe Attestation    I,:  Kiet Martel am acting as a scribe while in the presence of the attending physician :       I,:  Reji Dent MD personally performed the services described in this documentation    as scribed in my presence :         The patient was seen and discussed with Dr Reji Dent       RTC: 11/1/2021 at 8 AM for [facility] follow-up    Crissy Preciado  Dermatology PGY-3 Resident Physician

## 2021-08-10 LAB — MISCELLANEOUS LAB TEST RESULT: NORMAL

## 2021-08-27 ENCOUNTER — APPOINTMENT (OUTPATIENT)
Dept: LAB | Facility: CLINIC | Age: 51
End: 2021-08-27
Payer: COMMERCIAL

## 2021-08-27 DIAGNOSIS — R21 RASH AND NONSPECIFIC SKIN ERUPTION: ICD-10-CM

## 2021-08-27 LAB
ALBUMIN SERPL BCP-MCNC: 3.8 G/DL (ref 3.5–5)
ALP SERPL-CCNC: 87 U/L (ref 46–116)
ALT SERPL W P-5'-P-CCNC: 16 U/L (ref 12–78)
AST SERPL W P-5'-P-CCNC: 11 U/L (ref 5–45)
BASOPHILS # BLD AUTO: 0.08 THOUSANDS/ΜL (ref 0–0.1)
BASOPHILS NFR BLD AUTO: 1 % (ref 0–1)
BILIRUB DIRECT SERPL-MCNC: 0.15 MG/DL (ref 0–0.2)
BILIRUB SERPL-MCNC: 0.59 MG/DL (ref 0.2–1)
EOSINOPHIL # BLD AUTO: 0.17 THOUSAND/ΜL (ref 0–0.61)
EOSINOPHIL NFR BLD AUTO: 2 % (ref 0–6)
ERYTHROCYTE [DISTWIDTH] IN BLOOD BY AUTOMATED COUNT: 12 % (ref 11.6–15.1)
HCT VFR BLD AUTO: 41.4 % (ref 34.8–46.1)
HGB BLD-MCNC: 14 G/DL (ref 11.5–15.4)
IMM GRANULOCYTES # BLD AUTO: 0.02 THOUSAND/UL (ref 0–0.2)
IMM GRANULOCYTES NFR BLD AUTO: 0 % (ref 0–2)
LYMPHOCYTES # BLD AUTO: 1.92 THOUSANDS/ΜL (ref 0.6–4.47)
LYMPHOCYTES NFR BLD AUTO: 27 % (ref 14–44)
MCH RBC QN AUTO: 31.1 PG (ref 26.8–34.3)
MCHC RBC AUTO-ENTMCNC: 33.8 G/DL (ref 31.4–37.4)
MCV RBC AUTO: 92 FL (ref 82–98)
MONOCYTES # BLD AUTO: 0.42 THOUSAND/ΜL (ref 0.17–1.22)
MONOCYTES NFR BLD AUTO: 6 % (ref 4–12)
NEUTROPHILS # BLD AUTO: 4.5 THOUSANDS/ΜL (ref 1.85–7.62)
NEUTS SEG NFR BLD AUTO: 64 % (ref 43–75)
NRBC BLD AUTO-RTO: 0 /100 WBCS
PLATELET # BLD AUTO: 283 THOUSANDS/UL (ref 149–390)
PMV BLD AUTO: 10.2 FL (ref 8.9–12.7)
PROT SERPL-MCNC: 7.5 G/DL (ref 6.4–8.2)
RBC # BLD AUTO: 4.5 MILLION/UL (ref 3.81–5.12)
WBC # BLD AUTO: 7.11 THOUSAND/UL (ref 4.31–10.16)

## 2021-08-27 PROCEDURE — 80076 HEPATIC FUNCTION PANEL: CPT

## 2021-08-27 PROCEDURE — 82955 ASSAY OF G6PD ENZYME: CPT

## 2021-08-27 PROCEDURE — 85025 COMPLETE CBC W/AUTO DIFF WBC: CPT

## 2021-08-27 PROCEDURE — 36415 COLL VENOUS BLD VENIPUNCTURE: CPT

## 2021-08-30 LAB
G6PD BLD QN: 312 U/10E12 RBC (ref 127–427)
RBC # BLD AUTO: 4.23 X10E6/UL (ref 3.77–5.28)

## 2021-09-08 LAB — MISCELLANEOUS LAB TEST RESULT: NORMAL

## 2021-09-27 PROBLEM — M35.9 UNDIFFERENTIATED CONNECTIVE TISSUE DISEASE (HCC): Status: ACTIVE | Noted: 2021-09-27

## 2021-09-27 PROBLEM — G43.909 MIGRAINE WITHOUT STATUS MIGRAINOSUS, NOT INTRACTABLE: Status: ACTIVE | Noted: 2021-09-27

## 2021-09-27 PROBLEM — L51.9 ERYTHEMA MULTIFORME, UNSPECIFIED: Status: ACTIVE | Noted: 2021-09-27

## 2021-10-24 ENCOUNTER — OFFICE VISIT (OUTPATIENT)
Dept: URGENT CARE | Facility: CLINIC | Age: 51
End: 2021-10-24
Payer: COMMERCIAL

## 2021-10-24 VITALS — WEIGHT: 118 LBS | BODY MASS INDEX: 23.79 KG/M2 | HEIGHT: 59 IN

## 2021-10-24 DIAGNOSIS — R05.9 COUGH: Primary | ICD-10-CM

## 2021-10-24 DIAGNOSIS — J04.0 ACUTE LARYNGITIS: ICD-10-CM

## 2021-10-24 DIAGNOSIS — Z20.822 ENCOUNTER FOR SCREENING LABORATORY TESTING FOR COVID-19 VIRUS: ICD-10-CM

## 2021-10-24 PROCEDURE — S9083 URGENT CARE CENTER GLOBAL: HCPCS | Performed by: NURSE PRACTITIONER

## 2021-10-24 PROCEDURE — 0241U HB NFCT DS VIR RESP RNA 4 TRGT: CPT

## 2021-10-24 PROCEDURE — G0382 LEV 3 HOSP TYPE B ED VISIT: HCPCS | Performed by: NURSE PRACTITIONER

## 2021-10-25 LAB
FLUAV RNA RESP QL NAA+PROBE: NEGATIVE
FLUBV RNA RESP QL NAA+PROBE: NEGATIVE
RSV RNA RESP QL NAA+PROBE: NEGATIVE
SARS-COV-2 RNA RESP QL NAA+PROBE: NEGATIVE

## 2021-12-14 ENCOUNTER — ANNUAL EXAM (OUTPATIENT)
Dept: OBGYN CLINIC | Facility: CLINIC | Age: 51
End: 2021-12-14
Payer: COMMERCIAL

## 2021-12-14 VITALS
BODY MASS INDEX: 23.63 KG/M2 | WEIGHT: 117.2 LBS | SYSTOLIC BLOOD PRESSURE: 114 MMHG | DIASTOLIC BLOOD PRESSURE: 72 MMHG | HEIGHT: 59 IN

## 2021-12-14 DIAGNOSIS — Z01.419 GYNECOLOGIC EXAM NORMAL: Primary | ICD-10-CM

## 2021-12-14 DIAGNOSIS — Z12.31 ENCOUNTER FOR SCREENING MAMMOGRAM FOR BREAST CANCER: ICD-10-CM

## 2021-12-14 PROCEDURE — S0612 ANNUAL GYNECOLOGICAL EXAMINA: HCPCS | Performed by: PHYSICIAN ASSISTANT

## 2021-12-20 PROBLEM — F41.9 ANXIETY: Status: ACTIVE | Noted: 2021-12-20

## 2021-12-20 PROBLEM — K21.9 GASTROESOPHAGEAL REFLUX DISEASE WITHOUT ESOPHAGITIS: Status: ACTIVE | Noted: 2021-12-20

## 2022-01-03 ENCOUNTER — NURSE TRIAGE (OUTPATIENT)
Dept: OTHER | Facility: OTHER | Age: 52
End: 2022-01-03

## 2022-01-03 DIAGNOSIS — Z20.828 SARS-ASSOCIATED CORONAVIRUS EXPOSURE: Primary | ICD-10-CM

## 2022-01-03 NOTE — TELEPHONE ENCOUNTER
Regarding: Covid-Symptomatic  ----- Message from Chin Jimenez sent at 1/2/2022  4:53 PM EST -----  " I need to get tested due to a a Direct Exposure to Family Members, I have a Headache and stuffy Nose "

## 2022-01-04 PROCEDURE — 87636 SARSCOV2 & INF A&B AMP PRB: CPT | Performed by: FAMILY MEDICINE

## 2022-01-04 NOTE — TELEPHONE ENCOUNTER
1  Were you within 6 feet or less, for up to 15 minutes or more with a person that has a confirmed COVID-19 test? Yes   2  What was the date of your exposure? Lives with  who is covid positive since 1/2/21  3  Are you experiencing any symptoms attributed to the virus?  (Assess for SOB, cough, fever, difficulty breathing) headache, and chills   4  HIGH RISK: Do you have any history heart or lung conditions, weakened immune system, diabetes, Asthma, CHF, HIV, COPD, Chemo, renal failure, sickle cell, etc? no  5  PREGNANCY: Are you pregnant or did you recently give birth? 6   VACCINE: "Have you gotten the COVID-19 vaccine?" If Yes ask: "Which one, how many shots, when did you get it?" yes no booster though   no

## 2022-01-04 NOTE — TELEPHONE ENCOUNTER
Reason for Disposition   [1] COVID-19 infection suspected by caller or triager AND [2] mild symptoms (cough, fever, or others) AND [3] has not gotten tested yet    Additional Information   [1] Symptoms of COVID-19 (e g , cough, fever, SOB, or others) AND [2] within 14 days of EXPOSURE (close contact) with diagnosed or suspected COVID-19 patient    Protocols used: CORONAVIRUS (COVID-19) DIAGNOSED OR SUSPECTED-ADULT-[place], CORONAVIRUS (COVID-19) EXPOSURE-ADULT-[place]

## 2022-01-04 NOTE — TELEPHONE ENCOUNTER
Regarding: Covid-Symptomatic  ----- Message from Wesley Mcdonough RN sent at 1/4/2022  7:19 AM EST -----  Patient started with a fever last night

## 2022-01-06 ENCOUNTER — TELEPHONE (OUTPATIENT)
Dept: DERMATOLOGY | Age: 52
End: 2022-01-06

## 2022-01-06 DIAGNOSIS — L43.9 LICHEN PLANUS: Primary | ICD-10-CM

## 2022-01-06 NOTE — TELEPHONE ENCOUNTER
I spoke to patient  She notes legs flaring  She is potential covid exposure  Will renew topical corticosteroid till follow up

## 2022-01-06 NOTE — TELEPHONE ENCOUNTER
Pt is calling to state that she is having issues with the rash on her legs and that she doesn't have any medication for them and that they are bothering her a lot and is requesting a prescription until her apt with you on 1/13  Thank you!

## 2022-01-13 ENCOUNTER — OFFICE VISIT (OUTPATIENT)
Dept: DERMATOLOGY | Facility: CLINIC | Age: 52
End: 2022-01-13
Payer: COMMERCIAL

## 2022-01-13 VITALS — BODY MASS INDEX: 22.78 KG/M2 | TEMPERATURE: 98.7 F | WEIGHT: 116 LBS | HEIGHT: 60 IN

## 2022-01-13 DIAGNOSIS — R21 RASH: Primary | ICD-10-CM

## 2022-01-13 PROCEDURE — 88313 SPECIAL STAINS GROUP 2: CPT | Performed by: STUDENT IN AN ORGANIZED HEALTH CARE EDUCATION/TRAINING PROGRAM

## 2022-01-13 PROCEDURE — 88305 TISSUE EXAM BY PATHOLOGIST: CPT | Performed by: STUDENT IN AN ORGANIZED HEALTH CARE EDUCATION/TRAINING PROGRAM

## 2022-01-13 PROCEDURE — 11104 PUNCH BX SKIN SINGLE LESION: CPT | Performed by: DERMATOLOGY

## 2022-01-13 PROCEDURE — 87255 GENET VIRUS ISOLATE HSV: CPT | Performed by: STUDENT IN AN ORGANIZED HEALTH CARE EDUCATION/TRAINING PROGRAM

## 2022-01-13 NOTE — PROGRESS NOTES
[facility] Follow Up Note    Patient Name: Nory Rivera  Encounter Date: 01/13/2022    Today's Chief Concerns:  Mavis Cousin Concern #1:  Rash on bilateral legs     Current Medications:    Current Outpatient Medications:     ALPRAZOLAM PO, Take by mouth, Disp: , Rfl:     Cholecalciferol (VITAMIN D3 PO), Take by mouth, Disp: , Rfl:     clobetasol (TEMOVATE) 0 05 % GEL, APPLY LOCALLY 4 TO 6 TIMES DAILY  DO NOT EXCEED 2 WEEKS, Disp: , Rfl:     colchicine (COLCRYS) 0 6 mg tablet, Take 1 tablet (0 6 mg total) by mouth 2 (two) times a day, Disp: 60 tablet, Rfl: 3    Cyanocobalamin (VITAMIN B12 PO), Take by mouth, Disp: , Rfl:     dexamethasone 0 5 MG/5ML elixir, RINSE WITH 1 TEASPOONFUL FOR 2 MINUTES 2-4 TIMES A DAY  THEN SPIT OUT , Disp: , Rfl:     hydroxychloroquine (PLAQUENIL) 200 mg tablet, Take 1 tablet (200 mg total) by mouth 2 (two) times a day, Disp: 180 tablet, Rfl: 1    triamcinolone (KENALOG) 0 1 % ointment, Apply topically 2 (two) times a day, Disp: 453 6 g, Rfl: 0    mupirocin (BACTROBAN) 2 % ointment, Apply topically 3 (three) times a day (Patient not taking: Reported on 1/13/2022 ), Disp: , Rfl:     valACYclovir (VALTREX) 500 mg tablet, Take 500 mg by mouth 2 (two) times a day (Patient not taking: Reported on 1/13/2022 ), Disp: , Rfl:     CONSTITUTIONAL:   Vitals:    01/13/22 1600   Temp: 98 7 °F (37 1 °C)   TempSrc: Temporal   Weight: 52 6 kg (116 lb)   Height: 4' 11 5" (1 511 m)       Specific Alerts:    Have you been seen by a [facility] Dermatologist in the last 3 years? YES    Are you pregnant or planning to become pregnant? No    Are you currently or planning to be nursing or breast feeding? No    Allergies   Allergen Reactions    Doxycycline Rash    Keflex [Cephalexin] Rash       May we call your Preferred Phone number to discuss your specific medical information? YES    May we leave a detailed message that includes your specific medical information?  YES    Have you traveled outside of the [address] [place] [address] in the past 3 months? No    Do you currently have a pacemaker or defibrillator? No    Do you have any artificial heart valves, joints, plates, screws, rods, stents, pins, etc? No   - If Yes, were any placed within the last 2 years? Do you require any medications prior to a surgical procedure? No   - If Yes, for which procedure? n a   - If Yes, what medications to you require? n a    Are you taking any medications that cause you to bleed more easily ("blood thinners") No    Have you ever experienced a rapid heartbeat with epinephrine? No    Have you ever been treated with "gold" (gold sodium thiomalate) therapy? No    [facility] can help with wrinkles, "laugh lines," facial volume loss, "double chin," "love handles," age spots, and more  Are you interested in learning today about some of the skin enhancement procedures that we offer? (If Yes, please provide more detail) No    Review of Systems:  Have you recently had or currently have any of the following?     · Fever or chills: No  · Night Sweats: No  · Headaches: No  · Weight Gain: No  · Weight Loss: No  · Blurry Vision: No  · Nausea: No  · Vomiting: No  · Diarrhea: No  · Blood in Stool: No  · Abdominal Pain: No  · Itchy Skin: No  · Painful Joints: No  · Swollen Joints: No  · Muscle Pain: No  · Irregular Mole: No  · Sun Burn: No  · Dry Skin: No  · Skin Color Changes: No  · Scar or Keloid: No  · Cold Sores/Fever Blisters: YES  · Bacterial Infections/MRSA: No  · Anxiety: YES  · Depression: No  · Suicidal or Homicidal Thoughts: No      PSYCH: Normal mood and affect  EYES: Normal conjunctiva  ENT: Normal lips and oral mucosa  CARDIOVASCULAR: No edema  RESPIRATORY: Normal respirations  HEME/LYMPH/IMMUNO:  No regional lymphadenopathy except as noted below in ASSESSMENT AND PLAN BY DIAGNOSIS    FULL ORGAN SYSTEM SKIN EXAM (SKIN)   Hair, Scalp, Ears, Face Normal except as noted below in Assessment   Neck, Cervical Chain Nodes Normal except as noted below in Assessment   Right Arm/Hand/Fingers Normal except as noted below in Assessment   Left Arm/Hand/Fingers Normal except as noted below in Assessment   Chest/Breasts/Axillae Viewed areas Normal except as noted below in Assessment   Abdomen, Umbilicus Normal except as noted below in Assessment   Back/Spine Normal except as noted below in Assessment   Groin/Genitalia/Buttocks Viewed areas Normal except as noted below in Assessment   Right Leg, Foot, Toes Normal except as noted below in Assessment   Left Leg, Foot, Toes Normal except as noted below in Assessment     1  RASH    Physical Exam:   (Anatomic Location); (Size and Morphological Description); (Differential Diagnosis):  o Left lower leg; multiple erythemaous macules 2 0 cm x 1 0 cm blanching scaly red plaque; Differential lichenoid inflammation question recurrent erythema multiforma  Like lesions      Pertinent Positives:  History of paroxysmal erythematous patches lower legs   Mildly burning   Typically resolve in 3 weeks  Recurrent bouts over past several months  Pertinent Negatives: no history of arthritis  Recurrent mouths sores  ? Precede skin lesion  Pior biopsy showed  Final Diagnosis   A  Skin, right lower leg, punch biopsy:     Diffuse epidermal necrosis, focal vacuolar interface dermatitis, and superficial perivascular lymphocytic infiltrate with rare eosinophils (see note)              B  Skin, right thigh, punch biopsy:     Focal vacuolar interface dermatitis with superficial and mid-dermal perivascular/periadnexal lymphocytic infiltrate (see note)              C  Skin, right thigh, DIF:     Specimen entirely sent to [facility] for immunofluorescence analysis   A separate report will be issued by [facility] following completion of their studies          Assessment and Plan:  Based on a thorough discussion of this condition and the management approach to it (including a comprehensive discussion of the known risks, side effects and potential benefits of treatment), the patient (family) agrees to implement the following specific plan:   Punch biopsy done in office today    Viral culture done in office today for herpes simplex of one solitary blister mouth           PROCEDURE NOTE:  PUNCH BIOPSY      Performing Physician: Dr Almeida    Anatomic Location; Clinical Description with size (cm); Pre-Op Diagnosis:     Left lower leg; 2 0 cm x 1 0 cm blanching scaly red plaque; Differential lichenoid inflammation question recurrent erythema multiforma       Anesthesia: 1% xylocaine with epi       Topical anesthesia: None       Indications: To indicate diagnosis and management plan  Procedure Details     Patient informed of the risks (including bleeding,scaring and infection) and benefits of the procedure explained  Verbal and written informed consent obtained  The area was prepped and draped in the usual fashion  Anesthesia was obtained with 1% lidocaine with epinephrine  The skin was then stretched perpendicular to the skin tension lines and a punch biopsy to an appropriate sampling depth was obtained with a 4 mm punch with a forceps and iris scissors  Hemostasis was obtained with 4-0 Ethilon x 1 sutures  Complications:  None      Specimen has been sent for review by Dermatopathology  Plan:  1  Instructed to keep the wound dry and covered for 24-48h and clean thereafter  2  Warning signs of infection were reviewed  3  Recommended that the patient use acetaminophen as needed for pain  4  Sutures if any should be removed in 14 days      Standard post-procedure care has been explained and has been included in written form within the patient's copy of Informed Consent        Scribe Attestation    I,:  Rehana Cornejo MA am acting as a scribe while in the presence of the attending physician :       I,:  Kishor Landis MD personally performed the services described in this documentation    as scribed in my presence :

## 2022-01-13 NOTE — PATIENT INSTRUCTIONS
Assessment and Plan:  Based on a thorough discussion of this condition and the management approach to it (including a comprehensive discussion of the known risks, side effects and potential benefits of treatment), the patient (family) agrees to implement the following specific plan:   Punch biopsy done in office today    Viral culture done in office today     Plan:  1  Instructed to keep the wound dry and covered for 24-48h and clean thereafter  2  Warning signs of infection were reviewed  3  Recommended that the patient use acetaminophen as needed for pain  4  Sutures if any should be removed in 14 days      Standard post-procedure care has been explained and has been included in written form within the patient's copy of Informed Consent

## 2022-01-18 LAB — HSV SPEC CULT: NORMAL

## 2022-01-19 ENCOUNTER — OFFICE VISIT (OUTPATIENT)
Dept: OBGYN CLINIC | Facility: CLINIC | Age: 52
End: 2022-01-19
Payer: COMMERCIAL

## 2022-01-19 ENCOUNTER — APPOINTMENT (OUTPATIENT)
Dept: LAB | Facility: CLINIC | Age: 52
End: 2022-01-19
Payer: COMMERCIAL

## 2022-01-19 VITALS
BODY MASS INDEX: 22.78 KG/M2 | SYSTOLIC BLOOD PRESSURE: 106 MMHG | DIASTOLIC BLOOD PRESSURE: 68 MMHG | HEIGHT: 60 IN | WEIGHT: 116 LBS

## 2022-01-19 DIAGNOSIS — K13.79 MOUTH SORES: ICD-10-CM

## 2022-01-19 DIAGNOSIS — N90.89 VULVAR LESION: ICD-10-CM

## 2022-01-19 DIAGNOSIS — N90.89 VULVAR LESION: Primary | ICD-10-CM

## 2022-01-19 DIAGNOSIS — M35.2 BEHCET'S DISEASE (HCC): ICD-10-CM

## 2022-01-19 PROCEDURE — 3008F BODY MASS INDEX DOCD: CPT | Performed by: PHYSICIAN ASSISTANT

## 2022-01-19 PROCEDURE — 1036F TOBACCO NON-USER: CPT | Performed by: PHYSICIAN ASSISTANT

## 2022-01-19 PROCEDURE — 87255 GENET VIRUS ISOLATE HSV: CPT | Performed by: PHYSICIAN ASSISTANT

## 2022-01-19 PROCEDURE — 86695 HERPES SIMPLEX TYPE 1 TEST: CPT

## 2022-01-19 PROCEDURE — 99214 OFFICE O/P EST MOD 30 MIN: CPT | Performed by: PHYSICIAN ASSISTANT

## 2022-01-19 PROCEDURE — 86696 HERPES SIMPLEX TYPE 2 TEST: CPT

## 2022-01-19 NOTE — PROGRESS NOTES
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[address] )      Other Visit Diagnoses     Vulvar lesion    -  Primary    Relevant Orders    Herpes simplex virus culture (Completed)    HSV 1/2 IgM and Type Specific IgG (Completed)    HSV 1/2 IgG, W/Refl HSV2 Inhibition    Mouth sores        Relevant Orders    Herpes simplex virus culture (Completed)    HSV 1/2 IgM and Type Specific IgG (Completed)    HSV 1/2 IgG, W/Refl HSV2 Inhibition            Subjective:      Patient ID: Wallace Kaur is a 49 y o  female  Pt with complaints of vulvar swelling  Began yesterday with swelling just above perineum on the right labia  She noted the lump when she was showering and felt it incidentally  It was not bothersome to her at all  It is sore to the touch, but otherwise asx  No fevers/chills  Not sexually active in the last 2 years  No STD concerns  No urinary sx  Over the last 2 years she has been seeing derm recently and rheum for skin rashes and mouth sores  She has had at least 6-7 episodes of shallow oral sores over the last year  She recently had a oral swab done for HSV, but the lesions were almost resolved at the time of the swab  She denies h /o previous HSV or history of similar lesions or sx in the past    She also noted every few months develops a rash on her b/l LE  She has had the rash once on her upper arms, but no where else on her body  She had a recent biopsy  She has also had BW done through rheum which showed mildly elevated Sed rate that varies, although no other radha diagnosis found for recurrent sx           The following portions of the patient's history were reviewed and updated as appropriate: allergies, current medications, past family history, past medical history, past social history, past surgical history and problem list     Review of Systems   HENT: Positive for mouth sores  Respiratory: Negative  Cardiovascular: Negative  Endocrine: Negative  Genitourinary: Positive for genital sores  Skin: Positive for rash and wound  Psychiatric/Behavioral: Negative  Objective:      /68 (BP Location: Left arm, Patient Position: Sitting, Cuff Size: Standard)   Ht 4' 11 5" (1 511 m)   Wt 52 6 kg (116 lb)   LMP 01/11/2022 (Exact Date)   BMI 23 04 kg/m²          Physical Exam  Constitutional:       Appearance: She is normal weight  Cardiovascular:      Rate and Rhythm: Normal rate and regular rhythm  Pulmonary:      Effort: Pulmonary effort is normal       Breath sounds: Normal breath sounds  Abdominal:      General: Abdomen is flat  There is no distension  Palpations: Abdomen is soft  There is no mass  Tenderness: There is no abdominal tenderness  There is no right CVA tenderness, left CVA tenderness, guarding or rebound  Hernia: No hernia is present  Genitourinary:     Pubic Area: No rash  Labia:         Right: Lesion present  Left: No lesion  Urethra: No prolapse, urethral pain, urethral swelling or urethral lesion  Vagina: Normal       Cervix: Normal       Uterus: Normal        Comments: Right labia majora near posterior vaginal introitus at approx 7 oclock w 2small pustular appearing lesions present adjacent to one another  Just medial to the larger lesion there is a very small shallow ulceration present measuring 3 mm in max width  Just medial to that there does appear to be a slit like lesion present at the junction of the right labia minora and majora  No vesicles  No vaginal lesions  No other vulvar lesions  Lymphadenopathy:      Lower Body: No right inguinal adenopathy  No left inguinal adenopathy  Skin:     General: Skin is warm and dry  Neurological:      Mental Status: She is alert     Psychiatric:         Mood and Affect: Mood normal          Behavior: Behavior normal          Thought Content:  Thought content normal          Judgment: Judgment normal

## 2022-01-19 NOTE — RESULT ENCOUNTER NOTE
DERMATOPATHOLOGY RESULT NOTE    Results reviewed by ordering physician  Called patient to personally discuss results  Discussed results with patient  I NOTED THAT THESE FINDINGS WERE VERY NONSPECIFIC  I REVIEWED CLINICAL COURSE AND PRIOR BIOPSIES DURING MORE ACTIVE INFLAMMATION PERIODS  BIOPSIES HAVE SUGGESTED ERYTHEMA MULTIFORMA  AS NOEMI IS NEGATIVE MOST COMMON CAUSE FOR RECURRENT EM WOULD BE OCCULT HSV  CONSIDER SUPPRESSIVE TRIAL  Instructions for Clinical Derm Team:   (remember to route Result Note to appropriate staff):    None    Result & Plan by Specimen:    Specimen A: benign  Plan: SEE ABOVE    Final Diagnosis  A  Skin, left lower leg, punch biopsy:     Acanthosis with mild spongiosis, papillary dermal edema, and superficial perivascular lymphocytic infiltrate with erythrocyte extravasation (see note)      Note: Findings suggestive of irritation/trauma are present  The histopathologic findings are non-specific; in the appropriate clinical context, the histopathologic findings 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

## 2022-01-21 LAB — HSV SPEC CULT: NORMAL

## 2022-01-24 LAB — MISCELLANEOUS LAB TEST RESULT: NORMAL

## 2022-01-27 ENCOUNTER — OFFICE VISIT (OUTPATIENT)
Dept: DERMATOLOGY | Facility: CLINIC | Age: 52
End: 2022-01-27

## 2022-01-27 ENCOUNTER — TELEPHONE (OUTPATIENT)
Dept: DERMATOLOGY | Facility: CLINIC | Age: 52
End: 2022-01-27

## 2022-01-27 DIAGNOSIS — L51.9 ERYTHEMA MULTIFORME: Primary | ICD-10-CM

## 2022-01-27 DIAGNOSIS — Z48.02 ENCOUNTER FOR REMOVAL OF SUTURES: Primary | ICD-10-CM

## 2022-01-27 DIAGNOSIS — B00.9 HERPES SIMPLEX TYPE 2 INFECTION: ICD-10-CM

## 2022-01-27 DIAGNOSIS — L51.9 ERYTHEMA MULTIFORME: ICD-10-CM

## 2022-01-27 LAB — MISCELLANEOUS LAB TEST RESULT: NORMAL

## 2022-01-27 PROCEDURE — RECHECK: Performed by: DERMATOLOGY

## 2022-01-27 RX ORDER — VALACYCLOVIR HYDROCHLORIDE 1 G/1
1000 TABLET, FILM COATED ORAL DAILY
Qty: 90 TABLET | Refills: 1 | Status: SHIPPED | OUTPATIENT
Start: 2022-01-27 | End: 2022-05-12

## 2022-01-27 NOTE — PROGRESS NOTES
Suture removal    Date/Time: 1/27/2022 9:09 AM  Performed by: Nancy Dunn RN  Authorized by: Tiesha Gómez MD   Universal Protocol:  Consent: Verbal consent obtained  Written consent not obtained  Risks and benefits: risks, benefits and alternatives were discussed  Consent given by: patient  Patient understanding: patient states understanding of the procedure being performed  Patient consent: the patient's understanding of the procedure matches consent given  Procedure consent: procedure consent matches procedure scheduled  Relevant documents: relevant documents present and verified  Test results: test results available and properly labeled  Site marked: the operative site was not marked  Radiology Images displayed and confirmed  If images not available, report reviewed: imaging studies not available  Patient identity confirmed: verbally with patient        Patient location:  Clinic  Location:     Laterality:  Left    Location:  Lower extremity    Lower extremity location:  Leg    Leg location:  L lower leg  Procedure details: Tools used:  Suture removal kit    Wound appearance:  No sign(s) of infection and good wound healing    Number of sutures removed:  1  Post-procedure details:     Post-removal:  Band-Aid applied (Vaseline applied)    Patient tolerance of procedure: Tolerated well, no immediate complications  Comments:      Dr Chaitanya Yang aware lab results came back from [facility] [place]  Dr Chaitanya Yang will be reaching out to patient         Scribe Attestation    I,:  Nancy Dunn RN am acting as a scribe while in the presence of the attending physician :       I,:  Carla Logan MD personally performed the services described in this documentation    as scribed in my presence :

## 2022-01-27 NOTE — TELEPHONE ENCOUNTER
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

## 2022-01-27 NOTE — TELEPHONE ENCOUNTER
Patient called stating that she just spoke with Dr Sherine Tabares and he ordered a lab test for her  However, she said that her GYN had actually ordered the same test already and just got the results back  So she wanted to make sure that Dr Sherine Tabares was able to see the results as well and that she does not need to get this bw repeated  Please call patient to advise

## 2022-02-24 ENCOUNTER — RA CDI HCC (OUTPATIENT)
Dept: OTHER | Facility: HOSPITAL | Age: 52
End: 2022-02-24

## 2022-02-24 NOTE — PROGRESS NOTES
[facility] [address]  coding opportunities          Number of diagnosis code(s) already on the problem list added to  fla                     Patients insurance company: [employer] (Medicare Advantage and Commercial)     Visit status: Patient canceled the appointment        [facility] [address]  coding opportunities          Number of diagnosis code(s) already on the problem list added to  fla                With the beginning of the new year, this is a reminder to address ALL [facility] [address]  (risk adjustment) codes for  as patient scores reset to zero XENIA   M35 9 [facility] )       Patients insurance company: [employer] ([employer])

## 2022-03-21 PROBLEM — B00.9 HSV-2 (HERPES SIMPLEX VIRUS 2) INFECTION: Status: ACTIVE | Noted: 2022-03-21

## 2022-03-28 ENCOUNTER — HOSPITAL ENCOUNTER (OUTPATIENT)
Dept: RADIOLOGY | Age: 52
Discharge: HOME | End: 2022-03-28
Payer: COMMERCIAL

## 2022-03-28 VITALS — WEIGHT: 122 LBS | BODY MASS INDEX: 23.95 KG/M2 | HEIGHT: 60 IN

## 2022-03-28 DIAGNOSIS — Z12.31 ENCOUNTER FOR SCREENING MAMMOGRAM FOR BREAST CANCER: ICD-10-CM

## 2022-03-28 PROCEDURE — 77063 BREAST TOMOSYNTHESIS BI: CPT

## 2022-03-28 PROCEDURE — 77067 SCR MAMMO BI INCL CAD: CPT

## 2022-04-05 ENCOUNTER — TELEPHONE (OUTPATIENT)
Dept: DERMATOLOGY | Facility: CLINIC | Age: 52
End: 2022-04-05

## 2022-04-05 NOTE — TELEPHONE ENCOUNTER
Notes minor outbreak leg and lip   Keisha Renee has been clear  priviously flaring every week or 2  Will schedule ov near future

## 2022-04-08 ENCOUNTER — DOCUMENTATION (OUTPATIENT)
Dept: DERMATOLOGY | Facility: CLINIC | Age: 52
End: 2022-04-08

## 2022-04-08 NOTE — PROGRESS NOTES
[facility] SUMMARY     General Information   Patient Name Eliane Hoff   Date of Birth 1970   Patient MRN# [id number]   Patient Phone @PH8@       This note serves to document that this patient was formally discussed as part of the multidisciplinary [facility]  This multidisciplinary team included representation from (choose all that were represented in attendance this day):    Dermatology  Dermatopathology  Mohs Micrographic Surgery  Pediatric Dermatology      The multidisciplinary group discussed in detail and educated itself around the patient's clinical presentation, natural history of the condition, potential complications, and possible diagnostic evaluations and therapies  Relevant Pathology Reviewed? 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 yes    Results of conference were conveyed to patient? The total time spent discussing this patient in the multidisciplinary conference was approximately 30 minutes  Summary of conference was sent for attestation by moderator? yes      DISCLAIMERS:  TO THE TREATING PHYSICIAN:  This conference is a meeting of clinicians from various specialty areas who evaluate and discuss patients for whom a multidisciplinary treatment approach is being considered  Please note that the above opinion was a consensus of the conference attendees and is intended only to assist in quality care of the discussed patient    The responsibility for follow up on the input given during the conference, along with any final decisions regarding plan of care, is that of the patient and the patient's provider  Accordingly, appointments have only been recommended based on this information; they have NOT been scheduled  TO THE PATIENT:  This treatment summary and follow-up plan is provided to you to keep with your health care records and to share with your primary care provider  This summary is a brief record of major aspects of your cancer treatment  You can share your copy with any of your doctors or nurses  However, this is not a detailed or comprehensive record of your care

## 2022-04-11 ENCOUNTER — TELEPHONE (OUTPATIENT)
Dept: OBGYN CLINIC | Facility: CLINIC | Age: 52
End: 2022-04-11

## 2022-04-11 NOTE — TELEPHONE ENCOUNTER
Reviewed with patient  Saw Briandorita Espinoza in December for annual exam and made note of possibly trying to go back to [facility] if menses continued to be unbearable  Patient states has been off OCP for a while and thought it would get better but it hasnt  She said her periods are heavy and crampy and is getting every 3 weeks  Aware will review with Adenike and if sends in would like it sent to [facility]  Aware will review tomorrow and call her back to review if she sent and how to start

## 2022-04-12 DIAGNOSIS — N92.4 EXCESSIVE BLEEDING IN PREMENOPAUSAL PERIOD: Primary | ICD-10-CM

## 2022-04-12 RX ORDER — ACETAMINOPHEN AND CODEINE PHOSPHATE 120; 12 MG/5ML; MG/5ML
1 SOLUTION ORAL DAILY
Qty: 28 TABLET | Refills: 3 | Status: SHIPPED | OUTPATIENT
Start: 2022-04-12 | End: 2022-05-31 | Stop reason: ALTCHOICE

## 2022-04-12 NOTE — TELEPHONE ENCOUNTER
Yes we reviewed at her last appointment  I would recommend trial of progesterone only pill  Be sure patient knows to take at same time every day  Start Sunday after next menses starts  This is her safest option as has no estrogen and will not increase blood pressure or increase her risk of blood clots

## 2022-04-12 NOTE — TELEPHONE ENCOUNTER
Reviewed with patient in detail  All questions answered  Aware to call after finishes 3rd pack with update  If doing well, need BP check and then can fill until due for annual  If not doing well will need apt to discuss options

## 2022-05-12 ENCOUNTER — OFFICE VISIT (OUTPATIENT)
Dept: DERMATOLOGY | Facility: CLINIC | Age: 52
End: 2022-05-12
Payer: COMMERCIAL

## 2022-05-12 VITALS — BODY MASS INDEX: 23.95 KG/M2 | WEIGHT: 122 LBS | HEIGHT: 60 IN

## 2022-05-12 DIAGNOSIS — R21 RASH AND NONSPECIFIC SKIN ERUPTION: Primary | ICD-10-CM

## 2022-05-12 PROCEDURE — 3008F BODY MASS INDEX DOCD: CPT | Performed by: DERMATOLOGY

## 2022-05-12 PROCEDURE — 99213 OFFICE O/P EST LOW 20 MIN: CPT | Performed by: DERMATOLOGY

## 2022-05-12 PROCEDURE — 1036F TOBACCO NON-USER: CPT | Performed by: DERMATOLOGY

## 2022-05-12 RX ORDER — PREDNISONE 10 MG/1
TABLET ORAL
Qty: 9 TABLET | Refills: 4 | Status: SHIPPED | OUTPATIENT
Start: 2022-05-12 | End: 2022-05-31 | Stop reason: ALTCHOICE

## 2022-05-12 NOTE — PATIENT INSTRUCTIONS
RASH      Assessment and Plan:  Based on a thorough discussion of this condition and the management approach to it (including a comprehensive discussion of the known risks, side effects and potential benefits of treatment), the patient (family) agrees to implement the following specific plan: Take prednisone 10 mg daily for 3 days   Continue valtrex 1 gram daily  Get labs done and Chest X-ray done - will call with results

## 2022-05-12 NOTE — LETTER
May 12, 2022     Referral [address] [place] [id number]    Patient: Duglas Estrella   YOB: 1970   Date of Visit: 5/12/2022       Dear Dr Tianna Grey:    Thank you for referring Duglas Estrella to me for evaluation  Below are my notes for this consultation  If you have questions, please do not hesitate to call me  I look forward to following your patient along with you  Sincerely,        Eddie Chaves MD        CC: MD Eddie Rushing MD  5/12/2022  7:26 PM  Incomplete  [facility] Follow Up Note    Patient Name: Duglas Estrella  Encounter Date: 5/12/22    Today's Chief Concerns:  Bharat Flower Concern #1:  Follow up rash    Current Medications:    Current Outpatient Medications:     ALPRAZOLAM PO, Take by mouth, Disp: , Rfl:     Cholecalciferol (VITAMIN D3 PO), Take by mouth, Disp: , Rfl:     clobetasol (TEMOVATE) 0 05 % GEL, APPLY LOCALLY 4 TO 6 TIMES DAILY  DO NOT EXCEED 2 WEEKS, Disp: , Rfl:     Cyanocobalamin (VITAMIN B12 PO), Take by mouth, Disp: , Rfl:     dexamethasone 0 5 MG/5ML elixir, RINSE WITH 1 TEASPOONFUL FOR 2 MINUTES 2-4 TIMES A DAY   THEN SPIT OUT , Disp: , Rfl:     hydroxychloroquine (PLAQUENIL) 200 mg tablet, Take 1 tablet (200 mg total) by mouth 2 (two) times a day, Disp: 60 tablet, Rfl: 5    triamcinolone (KENALOG) 0 1 % ointment, Apply topically 2 (two) times a day, Disp: 453 6 g, Rfl: 0    valACYclovir (VALTREX) 1,000 mg tablet, Take 1 tablet (1,000 mg total) by mouth daily, Disp: 90 tablet, Rfl: 1    colchicine (COLCRYS) 0 6 mg tablet, Take 1 tablet (0 6 mg total) by mouth 2 (two) times a day (Patient not taking: No sig reported), Disp: 60 tablet, Rfl: 3    mupirocin (BACTROBAN) 2 % ointment, Apply topically 3 (three) times a day (Patient not taking: No sig reported), Disp: , Rfl:     norethindrone (Ortho Micronor) 0 35 MG tablet, Take 1 tablet (0 35 mg total) by mouth daily (Patient not taking: Reported on 5/12/2022), Disp: 28 tablet, Rfl: 3    CONSTITUTIONAL:   There were no vitals filed for this visit  Specific Alerts:    Have you been seen by a [facility] Dermatologist in the last 3 years? YES    Are you pregnant or planning to become pregnant? No    Are you currently or planning to be nursing or breast feeding? No    Allergies   Allergen Reactions    Doxycycline Rash    Keflex [Cephalexin] Rash       May we call your Preferred Phone number to discuss your specific medical information? YES    May we leave a detailed message that includes your specific medical information? YES    Have you traveled outside of the [facility] in the past 3 months? No    Do you currently have a pacemaker or defibrillator? No    Do you have any artificial heart valves, joints, plates, screws, rods, stents, pins, etc? No   - If Yes, were any placed within the last 2 years? Do you require any medications prior to a surgical procedure? No   - If Yes, for which procedure? - If Yes, what medications to you require? Are you taking any medications that cause you to bleed more easily ("blood thinners") No    Have you ever experienced a rapid heartbeat with epinephrine? No      Review of Systems:  Have you recently had or currently have any of the following?     · Fever or chills: No  · Night Sweats: No  · Headaches: No  · Weight Gain: No  · Weight Loss: No  · Blurry Vision: No  · Nausea: No  · Vomiting: No  · Diarrhea: No  · Blood in Stool: No  · Abdominal Pain: No  · Itchy Skin: No  · Painful Joints: No  · Swollen Joints: No  · Muscle Pain: No  · Irregular Mole: No  · Sun Burn: No  · Dry Skin: YES  · Skin Color Changes: No  · Scar or Keloid: YES  · Cold Sores/Fever Blisters: No  · Bacterial Infections/MRSA: No  · Anxiety: No  · Depression: No  · Suicidal or Homicidal Thoughts: No      PSYCH: Normal mood and affect  EYES: Normal conjunctiva  ENT: Normal lips and oral mucosa  CARDIOVASCULAR: No edema  RESPIRATORY: Normal respirations  HEME/LYMPH/IMMUNO:  No regional lymphadenopathy except as noted below in ASSESSMENT AND PLAN BY DIAGNOSIS    FULL ORGAN SYSTEM SKIN EXAM (SKIN)   Hair, Scalp, Ears, Face Normal except as noted below in Assessment   Neck, Cervical Chain Nodes Normal except as noted below in Assessment   Right Arm/Hand/Fingers Normal except as noted below in Assessment   Left Arm/Hand/Fingers Normal except as noted below in Assessment   Right Leg, Foot, Toes Normal except as noted below in Assessment   Left Leg, Foot, Toes Normal except as noted below in Assessment       Recurrent erythema multiforma like rash     Physical Exam:   (Anatomic Location); (Size and Morphological Description); (Differential Diagnosis):  o Bilateral arms and lower legs   Pertinent Positives: was initially quiet with valclacylovir but in past month flaring again with oral vaginal and skin lesions   Pertinent Negatives: Additional History of Present Condition:  Biopsies 1/13/22 and showed Acanthosis with mild spongiosis, papillary dermal edema, and superficial perivascular lymphocytic infiltrate with erythrocyte extravasation  Assessment and Plan:  Based on a thorough discussion of this condition and the management approach to it (including a comprehensive discussion of the known risks, side effects and potential benefits of treatment), the patient (family) agrees to implement the following specific plan:     Take prednisone 10 mg daily for 3 days    Continue valtrex 1 gram daily  Get labs done and Chest X-ray done - will call with results  Clinically these lesion do look llike recurrent erythema multiforma  Historically, thes are most often associate with infection like HSV but other chronic viral processed have been described  Rare associations with connetive tissue disease are descrubed,  Very rarely erythem muliforma can be a paraneoplastic phenomenon  Will do screening labs for infectious causes, including CXR    Consider workup for paraneoplastic process  Scribe Attestation    I,:  Yvette Cruz MA am acting as a scribe while in the presence of the attending physician :       I,:  Ej Niño MD personally performed the services described in this documentation    as scribed in my presence :           Ej Niño MD  5/12/2022  7:11 PM  Sign when Signing Visit  [facility] Follow Up Note    Patient Name: Quin Goodman  Encounter Date: 5/12/22    Today's Chief Concerns:  Mercedes Concern #1:  Follow up rash    Current Medications:    Current Outpatient Medications:     ALPRAZOLAM PO, Take by mouth, Disp: , Rfl:     Cholecalciferol (VITAMIN D3 PO), Take by mouth, Disp: , Rfl:     clobetasol (TEMOVATE) 0 05 % GEL, APPLY LOCALLY 4 TO 6 TIMES DAILY  DO NOT EXCEED 2 WEEKS, Disp: , Rfl:     Cyanocobalamin (VITAMIN B12 PO), Take by mouth, Disp: , Rfl:     dexamethasone 0 5 MG/5ML elixir, RINSE WITH 1 TEASPOONFUL FOR 2 MINUTES 2-4 TIMES A DAY  THEN SPIT OUT , Disp: , Rfl:     hydroxychloroquine (PLAQUENIL) 200 mg tablet, Take 1 tablet (200 mg total) by mouth 2 (two) times a day, Disp: 60 tablet, Rfl: 5    triamcinolone (KENALOG) 0 1 % ointment, Apply topically 2 (two) times a day, Disp: 453 6 g, Rfl: 0    valACYclovir (VALTREX) 1,000 mg tablet, Take 1 tablet (1,000 mg total) by mouth daily, Disp: 90 tablet, Rfl: 1    colchicine (COLCRYS) 0 6 mg tablet, Take 1 tablet (0 6 mg total) by mouth 2 (two) times a day (Patient not taking: No sig reported), Disp: 60 tablet, Rfl: 3    mupirocin (BACTROBAN) 2 % ointment, Apply topically 3 (three) times a day (Patient not taking: No sig reported), Disp: , Rfl:     norethindrone (Ortho Micronor) 0 35 MG tablet, Take 1 tablet (0 35 mg total) by mouth daily (Patient not taking: Reported on 5/12/2022), Disp: 28 tablet, Rfl: 3    CONSTITUTIONAL:   There were no vitals filed for this visit          Specific Alerts:    Have you been seen by a [facility] Dermatologist in the last 3 years?  YES    Are you pregnant or planning to become pregnant? No    Are you currently or planning to be nursing or breast feeding? No    Allergies   Allergen Reactions    Doxycycline Rash    Keflex [Cephalexin] Rash       May we call your Preferred Phone number to discuss your specific medical information? YES    May we leave a detailed message that includes your specific medical information? YES    Have you traveled outside of the [facility] in the past 3 months? No    Do you currently have a pacemaker or defibrillator? No    Do you have any artificial heart valves, joints, plates, screws, rods, stents, pins, etc? No   - If Yes, were any placed within the last 2 years? Do you require any medications prior to a surgical procedure? No   - If Yes, for which procedure? - If Yes, what medications to you require? Are you taking any medications that cause you to bleed more easily ("blood thinners") No    Have you ever experienced a rapid heartbeat with epinephrine? No      Review of Systems:  Have you recently had or currently have any of the following?     · Fever or chills: No  · Night Sweats: No  · Headaches: No  · Weight Gain: No  · Weight Loss: No  · Blurry Vision: No  · Nausea: No  · Vomiting: No  · Diarrhea: No  · Blood in Stool: No  · Abdominal Pain: No  · Itchy Skin: No  · Painful Joints: No  · Swollen Joints: No  · Muscle Pain: No  · Irregular Mole: No  · Sun Burn: No  · Dry Skin: YES  · Skin Color Changes: No  · Scar or Keloid: YES  · Cold Sores/Fever Blisters: No  · Bacterial Infections/MRSA: No  · Anxiety: No  · Depression: No  · Suicidal or Homicidal Thoughts: No      PSYCH: Normal mood and affect  EYES: Normal conjunctiva  ENT: Normal lips and oral mucosa  CARDIOVASCULAR: No edema  RESPIRATORY: Normal respirations  HEME/LYMPH/IMMUNO:  No regional lymphadenopathy except as noted below in ASSESSMENT AND PLAN BY DIAGNOSIS    FULL ORGAN SYSTEM SKIN EXAM (SKIN)   Hair, Scalp, Ears, Face Normal except as noted below in Assessment   Neck, Cervical Chain Nodes Normal except as noted below in Assessment   Right Arm/Hand/Fingers Normal except as noted below in Assessment   Left Arm/Hand/Fingers Normal except as noted below in Assessment   Right Leg, Foot, Toes Normal except as noted below in Assessment   Left Leg, Foot, Toes Normal except as noted below in Assessment       RASH    Physical Exam:   (Anatomic Location); (Size and Morphological Description); (Differential Diagnosis):  o Bilateral arms and lower legs   Pertinent Positives:   Pertinent Negatives: Additional History of Present Condition:  Biopsies 1/13/22 and showed Acanthosis with mild spongiosis, papillary dermal edema, and superficial perivascular lymphocytic infiltrate with erythrocyte extravasation  Assessment and Plan:  Based on a thorough discussion of this condition and the management approach to it (including a comprehensive discussion of the known risks, side effects and potential benefits of treatment), the patient (family) agrees to implement the following specific plan:     Take prednisone 10 mg daily for 3 days    Continue valtrex 1 gram daily   Get labs done and Chest X-ray done - will call with results        Scribe Attestation    I,:  Eamon Sanchez MA am acting as a scribe while in the presence of the attending physician :       I,:  Se Reynolds MD personally performed the services described in this documentation    as scribed in my presence :

## 2022-05-12 NOTE — PROGRESS NOTES
[facility] Follow Up Note    Patient Name: Quin Goodman  Encounter Date: 5/12/22    Today's Chief Concerns:  Gael Clements Concern #1:  Follow up rash    Current Medications:    Current Outpatient Medications:     ALPRAZOLAM PO, Take by mouth, Disp: , Rfl:     Cholecalciferol (VITAMIN D3 PO), Take by mouth, Disp: , Rfl:     clobetasol (TEMOVATE) 0 05 % GEL, APPLY LOCALLY 4 TO 6 TIMES DAILY  DO NOT EXCEED 2 WEEKS, Disp: , Rfl:     Cyanocobalamin (VITAMIN B12 PO), Take by mouth, Disp: , Rfl:     dexamethasone 0 5 MG/5ML elixir, RINSE WITH 1 TEASPOONFUL FOR 2 MINUTES 2-4 TIMES A DAY  THEN SPIT OUT , Disp: , Rfl:     hydroxychloroquine (PLAQUENIL) 200 mg tablet, Take 1 tablet (200 mg total) by mouth 2 (two) times a day, Disp: 60 tablet, Rfl: 5    triamcinolone (KENALOG) 0 1 % ointment, Apply topically 2 (two) times a day, Disp: 453 6 g, Rfl: 0    valACYclovir (VALTREX) 1,000 mg tablet, Take 1 tablet (1,000 mg total) by mouth daily, Disp: 90 tablet, Rfl: 1    colchicine (COLCRYS) 0 6 mg tablet, Take 1 tablet (0 6 mg total) by mouth 2 (two) times a day (Patient not taking: No sig reported), Disp: 60 tablet, Rfl: 3    mupirocin (BACTROBAN) 2 % ointment, Apply topically 3 (three) times a day (Patient not taking: No sig reported), Disp: , Rfl:     norethindrone (Ortho Micronor) 0 35 MG tablet, Take 1 tablet (0 35 mg total) by mouth daily (Patient not taking: Reported on 5/12/2022), Disp: 28 tablet, Rfl: 3    CONSTITUTIONAL:   There were no vitals filed for this visit  Specific Alerts:    Have you been seen by a [facility] Dermatologist in the last 3 years? YES    Are you pregnant or planning to become pregnant? No    Are you currently or planning to be nursing or breast feeding? No    Allergies   Allergen Reactions    Doxycycline Rash    Keflex [Cephalexin] Rash       May we call your Preferred Phone number to discuss your specific medical information?  YES    May we leave a detailed message that includes your specific medical information? YES    Have you traveled outside of the [facility] in the past 3 months? No    Do you currently have a pacemaker or defibrillator? No    Do you have any artificial heart valves, joints, plates, screws, rods, stents, pins, etc? No   - If Yes, were any placed within the last 2 years? Do you require any medications prior to a surgical procedure? No   - If Yes, for which procedure? - If Yes, what medications to you require? Are you taking any medications that cause you to bleed more easily ("blood thinners") No    Have you ever experienced a rapid heartbeat with epinephrine? No      Review of Systems:  Have you recently had or currently have any of the following?     · Fever or chills: No  · Night Sweats: No  · Headaches: No  · Weight Gain: No  · Weight Loss: No  · Blurry Vision: No  · Nausea: No  · Vomiting: No  · Diarrhea: No  · Blood in Stool: No  · Abdominal Pain: No  · Itchy Skin: No  · Painful Joints: No  · Swollen Joints: No  · Muscle Pain: No  · Irregular Mole: No  · Sun Burn: No  · Dry Skin: YES  · Skin Color Changes: No  · Scar or Keloid: YES  · Cold Sores/Fever Blisters: No  · Bacterial Infections/MRSA: No  · Anxiety: No  · Depression: No  · Suicidal or Homicidal Thoughts: No      PSYCH: Normal mood and affect  EYES: Normal conjunctiva  ENT: Normal lips and oral mucosa  CARDIOVASCULAR: No edema  RESPIRATORY: Normal respirations  HEME/LYMPH/IMMUNO:  No regional lymphadenopathy except as noted below in ASSESSMENT AND PLAN BY DIAGNOSIS    FULL ORGAN SYSTEM SKIN EXAM (SKIN)   Hair, Scalp, Ears, Face Normal except as noted below in Assessment   Neck, Cervical Chain Nodes Normal except as noted below in Assessment   Right Arm/Hand/Fingers Normal except as noted below in Assessment   Left Arm/Hand/Fingers Normal except as noted below in Assessment   Right Leg, Foot, Toes Normal except as noted below in Assessment   Left Leg, Foot, Toes Normal except as noted below in Assessment       Recurrent erythema multiforma like rash     Physical Exam:   (Anatomic Location); (Size and Morphological Description); (Differential Diagnosis):  o Bilateral arms and lower legs   Pertinent Positives: was initially quiet with valclacylovir but in past month flaring again with oral vaginal and skin lesions   Pertinent Negatives: Additional History of Present Condition:  Biopsies 1/13/22 and showed Acanthosis with mild spongiosis, papillary dermal edema, and superficial perivascular lymphocytic infiltrate with erythrocyte extravasation  Assessment and Plan:  Based on a thorough discussion of this condition and the management approach to it (including a comprehensive discussion of the known risks, side effects and potential benefits of treatment), the patient (family) agrees to implement the following specific plan:     Take prednisone 10 mg daily for 3 days    Continue valtrex 1 gram daily  Get labs done and Chest X-ray done - will call with results  Clinically these lesion do look llike recurrent erythema multiforma  Historically, thes are most often associate with infection like HSV but other chronic viral processed have been described  Rare associations with connetive tissue disease are descrubed,  Very rarely erythem muliforma can be a paraneoplastic phenomenon  Will do screening labs for infectious causes, including CXR  Consider workup for paraneoplastic process      Scribe Attestation    I,:  Jose Peacock MA am acting as a scribe while in the presence of the attending physician :       I,:  Chin Cosme MD personally performed the services described in this documentation    as scribed in my presence :

## 2022-05-13 ENCOUNTER — TELEPHONE (OUTPATIENT)
Dept: DERMATOLOGY | Facility: CLINIC | Age: 52
End: 2022-05-13

## 2022-05-13 ENCOUNTER — TELEPHONE (OUTPATIENT)
Dept: OBGYN CLINIC | Facility: CLINIC | Age: 52
End: 2022-05-13

## 2022-05-13 ENCOUNTER — LAB (OUTPATIENT)
Dept: LAB | Facility: CLINIC | Age: 52
End: 2022-05-13
Payer: COMMERCIAL

## 2022-05-13 DIAGNOSIS — R92.2 DENSE BREAST: ICD-10-CM

## 2022-05-13 DIAGNOSIS — Z80.3 FAMILY HISTORY OF BREAST CANCER: Primary | ICD-10-CM

## 2022-05-13 DIAGNOSIS — R21 RASH AND NONSPECIFIC SKIN ERUPTION: ICD-10-CM

## 2022-05-13 LAB — HCV AB SER QL: NORMAL

## 2022-05-13 PROCEDURE — 36415 COLL VENOUS BLD VENIPUNCTURE: CPT

## 2022-05-13 PROCEDURE — 86803 HEPATITIS C AB TEST: CPT

## 2022-05-13 PROCEDURE — 86664 EPSTEIN-BARR NUCLEAR ANTIGEN: CPT

## 2022-05-13 PROCEDURE — 86738 MYCOPLASMA ANTIBODY: CPT

## 2022-05-13 PROCEDURE — 86665 EPSTEIN-BARR CAPSID VCA: CPT

## 2022-05-13 PROCEDURE — 86663 EPSTEIN-BARR ANTIBODY: CPT

## 2022-05-13 NOTE — TELEPHONE ENCOUNTER
Pt lmom - has a question about her mammogram  mammo showed dense tissue, does she need further testing?  Mom did have breast cancer and passed in August

## 2022-05-13 NOTE — TELEPHONE ENCOUNTER
We can offer her an ABUS (automated breast ultrasound ) as supplemental screening but most insurances do not cover them so I would recommend she check with her insurance  The code would be family history of breast cancer and dense breasts

## 2022-05-13 NOTE — TELEPHONE ENCOUNTER
I disucssed treatment plan and potential of xray imaging if no cause identified for what clinically is reucrrent erythema multiforma

## 2022-05-13 NOTE — TELEPHONE ENCOUNTER
Patient called stating she was seen yesterday and she was suppose to have a prescription for Prednisone called into her pharmacy  I spoke with the patient and informed her that her Prednisone was sent into [facility] yesterday evening  Patient is aware and will contact the pharmacy to ensure they received it  I advised the patient to call back if she had any further questions or concerns

## 2022-05-14 ENCOUNTER — APPOINTMENT (OUTPATIENT)
Dept: RADIOLOGY | Facility: CLINIC | Age: 52
End: 2022-05-14
Payer: COMMERCIAL

## 2022-05-14 DIAGNOSIS — R21 RASH AND NONSPECIFIC SKIN ERUPTION: ICD-10-CM

## 2022-05-14 LAB
EBV NA IGG SER IA-ACNC: 78.6 U/ML (ref 0–17.9)
EBV VCA IGG SER IA-ACNC: 158 U/ML (ref 0–17.9)
EBV VCA IGM SER IA-ACNC: <36 U/ML (ref 0–35.9)
INTERPRETATION: ABNORMAL
M PNEUMO IGG SER IA-ACNC: 238 U/ML (ref 0–99)
M PNEUMO IGM SER IA-ACNC: <770 U/ML (ref 0–769)

## 2022-05-14 PROCEDURE — 71046 X-RAY EXAM CHEST 2 VIEWS: CPT

## 2022-05-16 ENCOUNTER — TELEPHONE (OUTPATIENT)
Dept: OBGYN CLINIC | Facility: CLINIC | Age: 52
End: 2022-05-16

## 2022-05-16 NOTE — RESULT ENCOUNTER NOTE
Awating results chest xray     Alexei Eubanks blood test show old viral infections, including mono but nothing looks acute    Will contact when ray resutlts recieved

## 2022-05-17 ENCOUNTER — TELEPHONE (OUTPATIENT)
Dept: DERMATOLOGY | Facility: CLINIC | Age: 52
End: 2022-05-17

## 2022-05-17 NOTE — TELEPHONE ENCOUNTER
Pt left vm asking us to send her medical records to another office, I cb and n/a so I advised she must sign a medical release form before we can send any records to another office outside of [facility], advised she can go to any of our locations to sign a medical release form, left cb info

## 2022-05-17 NOTE — TELEPHONE ENCOUNTER
Ret'd call to Pt and LVM advising I e-mailed a blank medical release form to her at [email or web address] and gave her fax number of [phone] to fax back to us

## 2022-05-23 DIAGNOSIS — C80.1: Primary | ICD-10-CM

## 2022-05-23 DIAGNOSIS — L51.9: Primary | ICD-10-CM

## 2022-05-26 ENCOUNTER — HOSPITAL ENCOUNTER (OUTPATIENT)
Dept: ULTRASOUND IMAGING | Facility: CLINIC | Age: 52
Discharge: HOME | End: 2022-05-26
Payer: COMMERCIAL

## 2022-05-26 VITALS — HEIGHT: 60 IN | BODY MASS INDEX: 23.94 KG/M2 | WEIGHT: 121.91 LBS

## 2022-05-26 DIAGNOSIS — Z80.3 FAMILY HISTORY OF BREAST CANCER: ICD-10-CM

## 2022-05-26 DIAGNOSIS — R92.2 DENSE BREAST: ICD-10-CM

## 2022-05-26 PROCEDURE — 76641 ULTRASOUND BREAST COMPLETE: CPT

## 2022-06-01 ENCOUNTER — OFFICE VISIT (OUTPATIENT)
Dept: FAMILY MEDICINE CLINIC | Facility: CLINIC | Age: 52
End: 2022-06-01
Payer: COMMERCIAL

## 2022-06-01 VITALS
OXYGEN SATURATION: 98 % | DIASTOLIC BLOOD PRESSURE: 72 MMHG | SYSTOLIC BLOOD PRESSURE: 122 MMHG | RESPIRATION RATE: 18 BRPM | HEIGHT: 60 IN | WEIGHT: 125.4 LBS | BODY MASS INDEX: 24.62 KG/M2 | HEART RATE: 83 BPM

## 2022-06-01 DIAGNOSIS — E04.1 NONTOXIC SINGLE THYROID NODULE: Primary | ICD-10-CM

## 2022-06-01 DIAGNOSIS — Z00.00 WELL ADULT EXAM: ICD-10-CM

## 2022-06-01 DIAGNOSIS — L51.9 ERYTHEMA MULTIFORME: ICD-10-CM

## 2022-06-01 DIAGNOSIS — E89.0 HISTORY OF PARTIAL THYROIDECTOMY: ICD-10-CM

## 2022-06-01 PROBLEM — J45.909 ASTHMA: Status: RESOLVED | Noted: 2017-06-28 | Resolved: 2022-06-01

## 2022-06-01 PROBLEM — M35.2 BEHCET'S DISEASE (HCC): Status: RESOLVED | Noted: 2022-01-19 | Resolved: 2022-06-01

## 2022-06-01 PROCEDURE — 99396 PREV VISIT EST AGE 40-64: CPT | Performed by: FAMILY MEDICINE

## 2022-06-01 PROCEDURE — 1036F TOBACCO NON-USER: CPT | Performed by: FAMILY MEDICINE

## 2022-06-01 PROCEDURE — 3725F SCREEN DEPRESSION PERFORMED: CPT | Performed by: FAMILY MEDICINE

## 2022-06-01 RX ORDER — VALACYCLOVIR HYDROCHLORIDE 1 G/1
1000 TABLET, FILM COATED ORAL 2 TIMES DAILY
COMMUNITY

## 2022-06-01 NOTE — PROGRESS NOTES
Assessment/Plan:    1  Nontoxic single thyroid nodule  -     US thyroid; Future; Expected date: 06/01/2022    2  Well adult exam    3  History of partial thyroidectomy  Comments:  right side - 2003 ron   Orders:  -     US thyroid; Future; Expected date: 06/01/2022    4  Erythema multiforme       Subjective:      Patient ID: Mai Burton is a 47 y o  female  HPI   Here to establish   Follows with derm for EM down in [place]  Had it for 3 yrs   Multiple evals   rhum work up   Other derm work up   Trial of valtrex - not helping   Was on plaquenil - didn't help   Topical steroids   The involvement of the oral ulcers is the weird part       Also with hx of right sided partial thyroidectomy   Going well     Colon cancer screening   Good for 10 yrs from 10/4/18'       The following portions of the patient's history were reviewed and updated as appropriate: allergies, current medications, past family history, past medical history, past social history, past surgical history and problem list     Review of Systems   Constitutional: Negative for fever and unexpected weight change  HENT: Negative for nosebleeds and trouble swallowing  Eyes: Negative for visual disturbance  Respiratory: Negative for chest tightness and shortness of breath  Cardiovascular: Negative for chest pain, palpitations and leg swelling  Gastrointestinal: Negative for abdominal pain, constipation, diarrhea and nausea  Endocrine: Negative for cold intolerance  Genitourinary: Negative for dysuria and urgency  Musculoskeletal: Negative for joint swelling and myalgias  Skin: Positive for rash  Neurological: Negative for tremors, seizures and syncope  Hematological: Does not bruise/bleed easily  Psychiatric/Behavioral: Negative for hallucinations and suicidal ideas           Objective:      /72 (BP Location: Left arm, Patient Position: Sitting, Cuff Size: Standard)   Pulse 83   Resp 18   Ht 4' 11 5" (1 511 m)   Wt 56 9 kg (125 lb 6 4 oz)   SpO2 98%   BMI 24 90 kg/m²     No visits with results within 2 Week(s) from this visit  Latest known visit with results is:   Lab on 05/13/2022   Component Date Value    Hepatitis C Ab 05/13/2022 Non-reactive     Mycoplasma pneumo IgG 05/13/2022 238 (A)    Mycoplasma pneumo IgM 05/13/2022 <770     EBV VCA IgG 05/13/2022 158 0 (A)    EBV VCA IgM 05/13/2022 <36 0     EBV Nuclear Ag Ab 05/13/2022 78 6 (A)    INTERPRETATION 05/13/2022 Comment           Physical Exam  Vitals and nursing note reviewed  Constitutional:       Appearance: She is well-developed  HENT:      Head: Normocephalic and atraumatic  Cardiovascular:      Rate and Rhythm: Normal rate and regular rhythm  Heart sounds: Normal heart sounds  No murmur heard  Pulmonary:      Effort: Pulmonary effort is normal       Breath sounds: Normal breath sounds  No wheezing or rales  Abdominal:      General: Bowel sounds are normal  There is no distension  Palpations: Abdomen is soft  Tenderness: There is no abdominal tenderness  Musculoskeletal:         General: No tenderness  Normal range of motion  Cervical back: Normal range of motion and neck supple  Lymphadenopathy:      Cervical: No cervical adenopathy  Skin:     General: Skin is warm and dry  Capillary Refill: Capillary refill takes less than 2 seconds  Findings: Rash present  Neurological:      Mental Status: She is alert and oriented to person, place, and time  Cranial Nerves: No cranial nerve deficit  Sensory: No sensory deficit  Motor: No abnormal muscle tone  Psychiatric:         Behavior: Behavior normal          Thought Content:  Thought content normal          Judgment: Judgment normal              Isadora Apley, MD  [facility]

## 2022-06-15 ENCOUNTER — HOSPITAL ENCOUNTER (OUTPATIENT)
Dept: ULTRASOUND IMAGING | Facility: HOSPITAL | Age: 52
Discharge: HOME | End: 2022-06-15
Attending: FAMILY MEDICINE
Payer: COMMERCIAL

## 2022-06-15 DIAGNOSIS — E89.0 HISTORY OF PARTIAL THYROIDECTOMY: ICD-10-CM

## 2022-06-15 DIAGNOSIS — E04.1 NONTOXIC SINGLE THYROID NODULE: ICD-10-CM

## 2022-06-15 PROCEDURE — 76536 US EXAM OF HEAD AND NECK: CPT

## 2022-06-21 ENCOUNTER — OFFICE VISIT (OUTPATIENT)
Dept: FAMILY MEDICINE CLINIC | Facility: CLINIC | Age: 52
End: 2022-06-21

## 2022-06-21 VITALS
WEIGHT: 129 LBS | RESPIRATION RATE: 16 BRPM | SYSTOLIC BLOOD PRESSURE: 116 MMHG | BODY MASS INDEX: 25.32 KG/M2 | HEART RATE: 85 BPM | HEIGHT: 60 IN | DIASTOLIC BLOOD PRESSURE: 78 MMHG | OXYGEN SATURATION: 98 %

## 2022-06-21 DIAGNOSIS — L51.9 ERYTHEMA MULTIFORME: Primary | ICD-10-CM

## 2022-06-21 DIAGNOSIS — Z48.02 VISIT FOR SUTURE REMOVAL: ICD-10-CM

## 2022-06-21 PROCEDURE — 99024 POSTOP FOLLOW-UP VISIT: CPT | Performed by: FAMILY MEDICINE

## 2022-06-21 PROCEDURE — 3008F BODY MASS INDEX DOCD: CPT | Performed by: FAMILY MEDICINE

## 2022-06-22 NOTE — PROGRESS NOTES
Suture removal    Date/Time: 6/21/2022 4:27 PM  Performed by: Juan Burns MD  Authorized by: Juan Burns MD   [facility] Protocol:  Risks and benefits: risks, benefits and alternatives were discussed      Procedure details: Tools used:  Scalpel    Wound appearance:  No sign(s) of infection    Number of sutures removed:  3  Post-procedure details:     Post-removal:  Dressing applied    Patient tolerance of procedure:   Tolerated with difficulty    Complication (if applicable):  Sutures placed 14 days ago and grown over mild bleeding to get them out

## 2022-09-15 ENCOUNTER — OFFICE VISIT (OUTPATIENT)
Dept: FAMILY MEDICINE CLINIC | Facility: CLINIC | Age: 52
End: 2022-09-15
Payer: COMMERCIAL

## 2022-09-15 VITALS
BODY MASS INDEX: 25.91 KG/M2 | OXYGEN SATURATION: 97 % | RESPIRATION RATE: 16 BRPM | HEIGHT: 60 IN | SYSTOLIC BLOOD PRESSURE: 110 MMHG | DIASTOLIC BLOOD PRESSURE: 76 MMHG | HEART RATE: 80 BPM | WEIGHT: 132 LBS

## 2022-09-15 DIAGNOSIS — J02.9 SORE THROAT: ICD-10-CM

## 2022-09-15 DIAGNOSIS — M77.8 FOREARM TENDONITIS: Primary | ICD-10-CM

## 2022-09-15 PROCEDURE — 99213 OFFICE O/P EST LOW 20 MIN: CPT | Performed by: FAMILY MEDICINE

## 2022-09-15 RX ORDER — METHYLPREDNISOLONE 4 MG/1
TABLET ORAL
COMMUNITY
Start: 2022-08-27 | End: 2022-09-15

## 2022-09-15 RX ORDER — NABUMETONE 500 MG/1
500 TABLET, FILM COATED ORAL 2 TIMES DAILY
Qty: 20 TABLET | Refills: 0 | Status: SHIPPED | OUTPATIENT
Start: 2022-09-15

## 2022-09-15 RX ORDER — PREDNISONE 10 MG/1
TABLET ORAL
COMMUNITY
Start: 2022-07-09 | End: 2022-09-15

## 2022-09-15 RX ORDER — CLOBETASOL PROPIONATE 0.5 MG/G
CREAM TOPICAL
COMMUNITY
Start: 2022-07-13 | End: 2022-09-15

## 2022-09-15 RX ORDER — DEXAMETHASONE 0.5 MG/5ML
SOLUTION ORAL
COMMUNITY
Start: 2022-08-24 | End: 2022-09-15

## 2022-09-15 RX ORDER — LIDOCAINE HYDROCHLORIDE 20 MG/ML
SOLUTION OROPHARYNGEAL
COMMUNITY
Start: 2022-09-13 | End: 2022-09-15

## 2022-09-15 RX ORDER — TRIAMCINOLONE ACETONIDE 0.1 %
PASTE (GRAM) DENTAL
COMMUNITY
Start: 2022-08-27 | End: 2022-09-15

## 2022-09-15 NOTE — PROGRESS NOTES
Assessment/Plan:    1  Forearm tendonitis  -     nabumetone (RELAFEN) 500 mg tablet; Take 1 tablet (500 mg total) by mouth 2 (two) times a day    2  Sore throat       Subjective:      Patient ID: Austyn Dobson is a 47 y o  female  HPI      sorethroat since 3-4 days now   Right arm x 2 weeks   Lifting or squeeze feels pain in the anticub area   Right handed   No trauma       Derm wants to try dapsom for next flair up       The following portions of the patient's history were reviewed and updated as appropriate: allergies, current medications, past family history, past medical history, past social history, past surgical history and problem list     Review of Systems   Musculoskeletal: Positive for arthralgias  Objective:      /76 (BP Location: Left arm, Patient Position: Sitting, Cuff Size: Standard)   Pulse 80   Resp 16   Ht 4' 11 5" (1 511 m)   Wt 59 9 kg (132 lb)   SpO2 97%   BMI 26 21 kg/m²     No visits with results within 2 Week(s) from this visit  Latest known visit with results is:   Lab on 05/13/2022   Component Date Value    Hepatitis C Ab 05/13/2022 Non-reactive     Mycoplasma pneumo IgG 05/13/2022 238 (A)    Mycoplasma pneumo IgM 05/13/2022 <770     EBV VCA IgG 05/13/2022 158 0 (A)    EBV VCA IgM 05/13/2022 <36 0     EBV Nuclear Ag Ab 05/13/2022 78 6 (A)    INTERPRETATION 05/13/2022 Comment           Physical Exam  Vitals and nursing note reviewed  Constitutional:       Appearance: She is well-developed  HENT:      Head: Normocephalic and atraumatic  Eyes:      Conjunctiva/sclera: Conjunctivae normal    Cardiovascular:      Rate and Rhythm: Normal rate and regular rhythm  Heart sounds: Normal heart sounds  No murmur heard  Pulmonary:      Effort: Pulmonary effort is normal       Breath sounds: Normal breath sounds  No wheezing or rales  Abdominal:      General: Bowel sounds are normal  There is no distension  Palpations: Abdomen is soft        Tenderness: There is no abdominal tenderness  Musculoskeletal:         General: Tenderness present  No deformity or signs of injury  Normal range of motion  Cervical back: Normal range of motion and neck supple  Comments: Right arm with tednonitis    Lymphadenopathy:      Cervical: No cervical adenopathy  Skin:     General: Skin is warm and dry  Capillary Refill: Capillary refill takes less than 2 seconds  Findings: No rash  Neurological:      Mental Status: She is alert and oriented to person, place, and time  Cranial Nerves: No cranial nerve deficit  Sensory: No sensory deficit  Motor: No abnormal muscle tone  Psychiatric:         Behavior: Behavior normal          Thought Content:  Thought content normal          Judgment: Judgment normal              Loni Krishnamurthy MD  [place] [id number]

## 2022-10-12 PROBLEM — Z12.39 ENCOUNTER FOR SCREENING FOR MALIGNANT NEOPLASM OF BREAST: Status: RESOLVED | Noted: 2018-07-24 | Resolved: 2022-10-12

## 2022-10-12 PROBLEM — Z01.419 ROUTINE GYNECOLOGICAL EXAMINATION: Status: RESOLVED | Noted: 2020-12-10 | Resolved: 2022-10-12

## 2022-10-20 ENCOUNTER — IMMUNIZATIONS (OUTPATIENT)
Dept: FAMILY MEDICINE CLINIC | Facility: CLINIC | Age: 52
End: 2022-10-20
Payer: COMMERCIAL

## 2022-10-20 DIAGNOSIS — Z23 ENCOUNTER FOR IMMUNIZATION: Primary | ICD-10-CM

## 2022-10-20 PROCEDURE — 90471 IMMUNIZATION ADMIN: CPT

## 2022-10-20 PROCEDURE — 90682 RIV4 VACC RECOMBINANT DNA IM: CPT

## 2022-11-30 ENCOUNTER — RA CDI HCC (OUTPATIENT)
Dept: OTHER | Facility: HOSPITAL | Age: 52
End: 2022-11-30

## 2022-11-30 NOTE — PROGRESS NOTES
[email or web address]  coding opportunities       Chart reviewed, no opportunity found: CHART REVIEWED, NO OPPORTUNITY FOUND        Patients Insurance        Commercial Insurance: [address] [place] [address]

## 2022-12-06 RX ORDER — DEXAMETHASONE 0.5 MG/5ML
SOLUTION ORAL
COMMUNITY
Start: 2022-09-30

## 2022-12-07 ENCOUNTER — OFFICE VISIT (OUTPATIENT)
Dept: FAMILY MEDICINE CLINIC | Facility: CLINIC | Age: 52
End: 2022-12-07

## 2022-12-07 VITALS
WEIGHT: 131.8 LBS | SYSTOLIC BLOOD PRESSURE: 102 MMHG | OXYGEN SATURATION: 98 % | RESPIRATION RATE: 16 BRPM | DIASTOLIC BLOOD PRESSURE: 72 MMHG | BODY MASS INDEX: 26.17 KG/M2 | HEART RATE: 71 BPM

## 2022-12-07 DIAGNOSIS — F41.9 ANXIETY: ICD-10-CM

## 2022-12-07 DIAGNOSIS — M35.9 UNDIFFERENTIATED CONNECTIVE TISSUE DISEASE (HCC): Primary | ICD-10-CM

## 2022-12-07 PROBLEM — S62.635A CLOSED MALLET FRACTURE OF DISTAL PHALANX OF LEFT RING FINGER: Status: RESOLVED | Noted: 2019-05-06 | Resolved: 2022-12-07

## 2022-12-07 RX ORDER — ALPRAZOLAM 0.25 MG/1
0.25 TABLET ORAL
Qty: 25 TABLET | Refills: 0 | Status: SHIPPED | OUTPATIENT
Start: 2022-12-07

## 2022-12-07 NOTE — PROGRESS NOTES
Assessment/Plan:    1  Undifferentiated connective tissue disease ([facility] [address] )    2  Anxiety  -     ALPRAZolam (XANAX) 0 25 mg tablet; Take 1 tablet (0 25 mg total) by mouth daily at bedtime as needed for anxiety    3  BMI 26 0-26 9,adult     Continue with the [address] dermatology also your own rheumatologist continue with the Valtrex and we can use an alprazolam as needed for anxiety at bedtime 1 bottle has lasted her an entire year      Subjective:      Patient ID: Juan Mcdaniel is a 47 y o  female  HPI     Has not started dapsone yet   She might not   Depends on what derm says   She has been taking the valtrex   Only a few outbreaks on the legs off an on     Still with tendonitis - right forarm - the relafen didn't help   When painting etc   Crp 8 5       Finds that she gets a little postural dizziness   When going from laying to standing   No syncope     The following portions of the patient's history were reviewed and updated as appropriate: allergies, current medications, past family history, past medical history, past social history, past surgical history and problem list     Review of Systems   Constitutional: Negative for fever and unexpected weight change  HENT: Negative for nosebleeds and trouble swallowing  Eyes: Negative for visual disturbance  Respiratory: Negative for chest tightness and shortness of breath  Cardiovascular: Negative for chest pain, palpitations and leg swelling  Gastrointestinal: Negative for abdominal pain, constipation, diarrhea and nausea  Endocrine: Negative for cold intolerance  Genitourinary: Negative for dysuria and urgency  Musculoskeletal: Positive for arthralgias  Negative for joint swelling and myalgias  Skin: Negative for rash  Neurological: Negative for tremors, seizures and syncope  Hematological: Does not bruise/bleed easily  Psychiatric/Behavioral: Negative for hallucinations and suicidal ideas           Objective:      /72 (BP Location: Left arm, Patient Position: Sitting, Cuff Size: Standard)   Pulse 71   Resp 16   Wt 59 8 kg (131 lb 12 8 oz)   SpO2 98%   BMI 26 17 kg/m²     No visits with results within 2 Week(s) from this visit  Latest known visit with results is:   Lab on 05/13/2022   Component Date Value   • Hepatitis C Ab 05/13/2022 Non-reactive    • Mycoplasma pneumo IgG 05/13/2022 238 (H)    • Mycoplasma pneumo IgM 05/13/2022 <770    • EBV VCA IgG 05/13/2022 158 0 (H)    • EBV VCA IgM 05/13/2022 <36 0    • EBV Nuclear Ag Ab 05/13/2022 78 6 (H)    • INTERPRETATION 05/13/2022 Comment           Physical Exam  Vitals and nursing note reviewed  Constitutional:       Appearance: She is well-developed  HENT:      Head: Normocephalic and atraumatic  Eyes:      Conjunctiva/sclera: Conjunctivae normal    Cardiovascular:      Rate and Rhythm: Normal rate and regular rhythm  Heart sounds: Normal heart sounds  No murmur heard  Pulmonary:      Effort: Pulmonary effort is normal       Breath sounds: Normal breath sounds  No wheezing or rales  Abdominal:      General: Bowel sounds are normal  There is no distension  Palpations: Abdomen is soft  Tenderness: There is no abdominal tenderness  Musculoskeletal:         General: No tenderness, deformity or signs of injury  Normal range of motion  Cervical back: Normal range of motion and neck supple  Comments: Right arm with tednonitis    Lymphadenopathy:      Cervical: No cervical adenopathy  Skin:     General: Skin is warm and dry  Capillary Refill: Capillary refill takes less than 2 seconds  Findings: No rash  Neurological:      Mental Status: She is alert and oriented to person, place, and time  Cranial Nerves: No cranial nerve deficit  Sensory: No sensory deficit  Motor: No abnormal muscle tone  Psychiatric:         Behavior: Behavior normal          Thought Content:  Thought content normal          Judgment: Judgment normal            BMI Counseling: Body mass index is 26 17 kg/m²  The BMI is above normal  Nutrition recommendations include decreasing portion sizes, encouraging healthy choices of fruits and vegetables, decreasing fast food intake, consuming healthier snacks and limiting drinks that contain sugar  Exercise recommendations include exercising 3-5 times per week  No pharmacotherapy was ordered  Rationale for BMI follow-up plan is due to patient being overweight or obese         Audrey Womack MD  [place] [phone]

## 2023-01-03 ENCOUNTER — ANNUAL EXAM (OUTPATIENT)
Dept: OBGYN CLINIC | Facility: CLINIC | Age: 53
End: 2023-01-03

## 2023-01-03 VITALS
HEIGHT: 60 IN | DIASTOLIC BLOOD PRESSURE: 74 MMHG | BODY MASS INDEX: 25.72 KG/M2 | SYSTOLIC BLOOD PRESSURE: 108 MMHG | WEIGHT: 131 LBS

## 2023-01-03 DIAGNOSIS — R92.2 DENSE BREAST: ICD-10-CM

## 2023-01-03 DIAGNOSIS — Z01.419 ENCOUNTER FOR GYNECOLOGICAL EXAMINATION WITHOUT ABNORMAL FINDING: Primary | ICD-10-CM

## 2023-01-03 DIAGNOSIS — Z12.31 ENCOUNTER FOR SCREENING MAMMOGRAM FOR MALIGNANT NEOPLASM OF BREAST: ICD-10-CM

## 2023-01-03 DIAGNOSIS — N92.0 MENORRHAGIA WITH REGULAR CYCLE: ICD-10-CM

## 2023-01-03 RX ORDER — MEFENAMIC ACID 250 MG/1
CAPSULE ORAL
Qty: 28 CAPSULE | Refills: 3 | Status: SHIPPED | OUTPATIENT
Start: 2023-01-03

## 2023-01-03 NOTE — PROGRESS NOTES
Subjective      Magalys Sinha is a 47 y o  G1, P3 female who presents for annual well woman exam  Periods are regular every 28-30 days, lasting 6 days 3 to 4 days are heavy reviewed options to control and checking lab work  No intermenstrual bleeding, spotting, or discharge  Patient reports No hot flashes/night sweats, No pain problems intercourse, No vaginal dryness, sleeping fairly well  History of abnormal Pap smear: no  Family history of uterine or ovarian cancer: no  Regular self breast exam: yes  History of abnormal mammogram: no  Family history of breast cancer: yes -mother with breast cancer    Menstrual History:  OB History        1    Para   1    Term   1            AB        Living   1       SAB        IAB        Ectopic        Multiple        Live Births   1                No LMP recorded  Patient is premenopausal        The following portions of the patient's history were reviewed and updated as appropriate: allergies, current medications, past family history, past medical history, past social history, past surgical history and problem list   Past Medical History:   Diagnosis Date   • Anxiety    • Disease of thyroid gland    • Erythema multiforme    • GERD (gastroesophageal reflux disease)    • Hx of oral aphthous ulcers    • Migraine headache    • Osteoarthritis    • Thyroid nodule    • Ulcerative colitis ([facility] [address] )    • Undifferentiated connective tissue disease ([facility] )      Past Surgical History:   Procedure Laterality Date   • EGD AND COLONOSCOPY N/A 10/4/2018    Procedure: EGD AND COLONOSCOPY;  Surgeon: Makenzie Menezes MD;  Location: [facility]; Service: Gastroenterology   • THYROIDECTOMY Right      OB History        1    Para   1    Term   1            AB        Living   1       SAB        IAB        Ectopic        Multiple        Live Births   1                 Review of Systems  Review of Systems   Constitutional: Negative    Negative for chills, fatigue, fever and unexpected weight change  HENT: Negative  Negative for dental problem, sinus pressure and sinus pain  Eyes: Negative  Negative for visual disturbance  Respiratory: Negative  Negative for cough, shortness of breath and wheezing  Cardiovascular: Negative  Negative for chest pain and leg swelling  Gastrointestinal: Negative  Negative for constipation, diarrhea, nausea and vomiting  Genitourinary: Negative for menstrual problem, pelvic pain and urgency  Heavy,  irregular periods  cramping   Musculoskeletal: Negative  Negative for back pain  Allergic/Immunologic: Negative  Negative for environmental allergies  Neurological: Negative  Negative for dizziness and headaches  Objective   Vitals:    23 1614   BP: 108/74   BP Location: Left arm   Patient Position: Sitting   Cuff Size: Standard   Weight: 59 4 kg (131 lb)   Height: 5' (1 524 m)       General:   alert and oriented, in no acute distress   Heart: regular rate and rhythm, S1, S2 normal, no murmur, click, rub or gallop   Lungs: clear to auscultation bilaterally   Abdomen: soft, non-tender, without masses or organomegaly   Vulva: normal   Vagina: normal mucosa   Cervix: no cervical motion tenderness and no lesions   Uterus: normal size, mobile, non-tender   Adnexa: normal adnexa and no mass, fullness, tenderness   Breast inspection negative, no nipple discharge or bleeding, no masses or nodularity palpable  Rectal negative, stool guaiac negative  Thyroid normal no masses or nodules palpable, partial thyroidectomy     Assessment   47year-old  here for annual exam with increased heavy menses also family history of breast cancer with concern for screening discussed  ABUS interspersed with mammogram 6 months apart for dense breasts    Also with heavy menses counseled on options to treat with like Ponstel     Plan   Patient continues follow-up with rheumatology and dermatology, Ponstel prescribed, given slip for CBC differential, TSH, free T4, iron profile, ordered mammogram for March and ABUS in September return in 1 year or sooner as needed

## 2023-01-12 ENCOUNTER — OFFICE VISIT (OUTPATIENT)
Dept: FAMILY MEDICINE CLINIC | Facility: CLINIC | Age: 53
End: 2023-01-12

## 2023-01-12 VITALS
DIASTOLIC BLOOD PRESSURE: 78 MMHG | WEIGHT: 134 LBS | HEART RATE: 84 BPM | RESPIRATION RATE: 16 BRPM | HEIGHT: 60 IN | OXYGEN SATURATION: 98 % | BODY MASS INDEX: 26.31 KG/M2 | SYSTOLIC BLOOD PRESSURE: 118 MMHG

## 2023-01-12 DIAGNOSIS — J06.9 UPPER RESPIRATORY TRACT INFECTION, UNSPECIFIED TYPE: Primary | ICD-10-CM

## 2023-01-12 DIAGNOSIS — M35.9 UNDIFFERENTIATED CONNECTIVE TISSUE DISEASE (HCC): ICD-10-CM

## 2023-01-12 RX ORDER — BROMPHENIRAMINE MALEATE, PSEUDOEPHEDRINE HYDROCHLORIDE, AND DEXTROMETHORPHAN HYDROBROMIDE 2; 30; 10 MG/5ML; MG/5ML; MG/5ML
5 SYRUP ORAL 4 TIMES DAILY PRN
Qty: 160 ML | Refills: 0 | Status: SHIPPED | OUTPATIENT
Start: 2023-01-12

## 2023-01-12 RX ORDER — DEXAMETHASONE 2 MG/1
2 TABLET ORAL 2 TIMES DAILY WITH MEALS
Qty: 10 TABLET | Refills: 0 | Status: SHIPPED | OUTPATIENT
Start: 2023-01-12 | End: 2023-01-17

## 2023-01-12 NOTE — PROGRESS NOTES
Assessment/Plan:    1  Upper respiratory tract infection, unspecified type  -     brompheniramine-pseudoephedrine-DM 30-2-10 MG/5ML syrup; Take 5 mL by mouth 4 (four) times a day as needed for congestion or cough  -     dexamethasone (DECADRON) 2 mg tablet; Take 1 tablet (2 mg total) by mouth 2 (two) times a day with meals for 5 days    2  Undifferentiated connective tissue disease ([address] )       Subjective:      Patient ID: Juan Barker is a 47 y o  female  HPI     Coughing and wheezing and chest congestion    4 days now   Worsening   No fevers   No sick contacts     Works from home     No myalgia   Is sneezing   Mild sinus pressure     Tried adival tylenol   Cold and flu caps     Cough drops    Not sleeping well due to coughing     The following portions of the patient's history were reviewed and updated as appropriate: allergies, current medications, past family history, past medical history, past social history, past surgical history and problem list     Review of Systems   All other systems reviewed and are negative  Objective:      /78 (BP Location: Right arm, Patient Position: Sitting, Cuff Size: Standard)   Pulse 84   Resp 16   Ht 5' (1 524 m)   Wt 60 8 kg (134 lb)   LMP 12/24/2022   SpO2 98%   BMI 26 17 kg/m²     No visits with results within 2 Week(s) from this visit  Latest known visit with results is:   Lab on 05/13/2022   Component Date Value   • Hepatitis C Ab 05/13/2022 Non-reactive    • Mycoplasma pneumo IgG 05/13/2022 238 (H)    • Mycoplasma pneumo IgM 05/13/2022 <770    • EBV VCA IgG 05/13/2022 158 0 (H)    • EBV VCA IgM 05/13/2022 <36 0    • EBV Nuclear Ag Ab 05/13/2022 78 6 (H)    • INTERPRETATION 05/13/2022 Comment           Physical Exam  Vitals and nursing note reviewed  Constitutional:       Appearance: She is well-developed  She is ill-appearing  HENT:      Head: Normocephalic and atraumatic  Nose: Congestion present        Mouth/Throat:      Pharynx: Posterior oropharyngeal erythema present  Cardiovascular:      Rate and Rhythm: Normal rate and regular rhythm  Heart sounds: Normal heart sounds  No murmur heard  Pulmonary:      Effort: Pulmonary effort is normal       Breath sounds: Normal breath sounds  No wheezing or rales  Abdominal:      General: Bowel sounds are normal  There is no distension  Palpations: Abdomen is soft  Tenderness: There is no abdominal tenderness  Musculoskeletal:         General: No tenderness  Normal range of motion  Cervical back: Normal range of motion and neck supple  Lymphadenopathy:      Cervical: No cervical adenopathy  Skin:     General: Skin is warm and dry  Capillary Refill: Capillary refill takes less than 2 seconds  Findings: No rash  Neurological:      Mental Status: She is alert and oriented to person, place, and time  Cranial Nerves: No cranial nerve deficit  Sensory: No sensory deficit  Motor: No abnormal muscle tone  Psychiatric:         Behavior: Behavior normal          Thought Content:  Thought content normal          Judgment: Judgment normal              Inessa Ornelas MD  [place] [address]

## 2023-02-15 DIAGNOSIS — F41.9 ANXIETY: ICD-10-CM

## 2023-02-15 RX ORDER — ALPRAZOLAM 0.25 MG/1
0.25 TABLET ORAL
Qty: 25 TABLET | Refills: 0 | Status: SHIPPED | OUTPATIENT
Start: 2023-02-15

## 2023-02-16 RX ORDER — CLOBETASOL PROPIONATE 0.5 MG/G
OINTMENT TOPICAL
COMMUNITY
Start: 2023-02-03

## 2023-02-17 ENCOUNTER — OFFICE VISIT (OUTPATIENT)
Dept: FAMILY MEDICINE CLINIC | Facility: CLINIC | Age: 53
End: 2023-02-17

## 2023-02-17 VITALS
BODY MASS INDEX: 26.7 KG/M2 | DIASTOLIC BLOOD PRESSURE: 76 MMHG | WEIGHT: 136 LBS | HEIGHT: 60 IN | SYSTOLIC BLOOD PRESSURE: 118 MMHG | HEART RATE: 90 BPM | OXYGEN SATURATION: 98 % | RESPIRATION RATE: 16 BRPM

## 2023-02-17 DIAGNOSIS — R07.89 CHEST TIGHTNESS: ICD-10-CM

## 2023-02-17 DIAGNOSIS — K21.9 GASTROESOPHAGEAL REFLUX DISEASE WITHOUT ESOPHAGITIS: ICD-10-CM

## 2023-02-17 DIAGNOSIS — F41.9 ANXIETY: ICD-10-CM

## 2023-02-17 DIAGNOSIS — L93.0 DISCOID LUPUS ERYTHEMATOSUS: ICD-10-CM

## 2023-02-17 DIAGNOSIS — K12.0 APHTHOUS ULCER: Primary | ICD-10-CM

## 2023-02-17 PROBLEM — M35.9 UNDIFFERENTIATED CONNECTIVE TISSUE DISEASE (HCC): Status: RESOLVED | Noted: 2021-09-27 | Resolved: 2023-02-17

## 2023-02-17 PROBLEM — Z01.419 GYNECOLOGIC EXAM NORMAL: Status: RESOLVED | Noted: 2019-07-30 | Resolved: 2023-02-17

## 2023-02-17 PROBLEM — L51.9 ERYTHEMA MULTIFORME: Status: RESOLVED | Noted: 2021-09-27 | Resolved: 2023-02-17

## 2023-02-17 PROBLEM — R19.7 DIARRHEA: Status: RESOLVED | Noted: 2018-09-18 | Resolved: 2023-02-17

## 2023-02-17 RX ORDER — TIOTROPIUM BROMIDE AND OLODATEROL 3.124; 2.736 UG/1; UG/1
2 SPRAY, METERED RESPIRATORY (INHALATION) DAILY
Qty: 4 G | Refills: 0 | Status: SHIPPED | COMMUNITY
Start: 2023-02-17

## 2023-02-17 RX ORDER — METHYLPREDNISOLONE 4 MG/1
TABLET ORAL
Qty: 1 EACH | Refills: 0 | Status: SHIPPED | OUTPATIENT
Start: 2023-02-17

## 2023-02-17 NOTE — PROGRESS NOTES
Assessment/Plan:  I would like her to hold off on using the oral dexamethasone elixir she has not been swallowing it can then transfer over to a Medrol Dosepak and I think given the respiratory tightness a inhaler nonsteroid-based is going to be best  For the oral ulcers when we does move onto some Magic mouthwash  I feel that she probably picked up some sort of viral syndrome first that led to worsening of the ulcers this has been going on now for about a good couple weeks  No antibiotic indicated as there is no productive cough or fever or lungs sound is fine      1  Aphthous ulcer  -     al mag oxide-diphenhydramine-lidocaine viscous (MAGIC MOUTHWASH) 1:1:1 suspension; Swish and spit 10 mL every 4 (four) hours as needed for mouth pain or discomfort  -     methylPREDNISolone 4 MG tablet therapy pack; Follow package directions  -     tiotropium-olodaterol (Stiolto Respimat) 2 5-2 5 MCG/ACT inhaler; Inhale 2 puffs daily    2  Chest tightness  -     methylPREDNISolone 4 MG tablet therapy pack; Follow package directions  -     tiotropium-olodaterol (Stiolto Respimat) 2 5-2 5 MCG/ACT inhaler; Inhale 2 puffs daily    3  Discoid lupus erythematosus    4  Anxiety    5  Gastroesophageal reflux disease without esophagitis       Subjective:      Patient ID: Augusto Rogers is a 47 y o  female      HPI    Cough (Has had a cough for about a month; had it last visit and it went away for a few days and came right back)  Shortness of Breath (When patient inhales, she feels like something is sitting on her chest and is worse at night)    Breathing is off   Worse at night   Can cough   Does find that xanax can help but then again she falls asleep   shakeel like someone is sitting on her   No hx of asthma     1/12 uri dexameth and bromfed     dexameth elixer x 9 days  Bid swish and spit     The following portions of the patient's history were reviewed and updated as appropriate: allergies, current medications, past family history, past medical history, past social history, past surgical history and problem list     Review of Systems   Respiratory: Positive for chest tightness and shortness of breath  Objective:      /76 (BP Location: Left arm, Patient Position: Sitting, Cuff Size: Standard)   Pulse 90   Resp 16   Ht 5' (1 524 m)   Wt 61 7 kg (136 lb)   SpO2 98%   BMI 26 56 kg/m²     No visits with results within 2 Week(s) from this visit  Latest known visit with results is:   Lab on 05/13/2022   Component Date Value   • Hepatitis C Ab 05/13/2022 Non-reactive    • Mycoplasma pneumo IgG 05/13/2022 238 (H)    • Mycoplasma pneumo IgM 05/13/2022 <770    • EBV VCA IgG 05/13/2022 158 0 (H)    • EBV VCA IgM 05/13/2022 <36 0    • EBV Nuclear Ag Ab 05/13/2022 78 6 (H)    • INTERPRETATION 05/13/2022 Comment           Physical Exam  Vitals and nursing note reviewed  Constitutional:       Appearance: She is well-developed  HENT:      Head: Normocephalic and atraumatic  Mouth/Throat:      Comments: Many oral ulcers same  Cardiovascular:      Rate and Rhythm: Normal rate and regular rhythm  Heart sounds: Normal heart sounds  No murmur heard  Pulmonary:      Effort: Pulmonary effort is normal       Breath sounds: Normal breath sounds  No wheezing or rales  Abdominal:      General: Bowel sounds are normal  There is no distension  Palpations: Abdomen is soft  Tenderness: There is no abdominal tenderness  Musculoskeletal:         General: No tenderness  Normal range of motion  Cervical back: Normal range of motion and neck supple  Lymphadenopathy:      Cervical: No cervical adenopathy  Skin:     General: Skin is warm and dry  Capillary Refill: Capillary refill takes less than 2 seconds  Findings: No rash  Neurological:      Mental Status: She is alert and oriented to person, place, and time  Cranial Nerves: No cranial nerve deficit  Sensory: No sensory deficit        Motor: No abnormal muscle tone  Psychiatric:         Behavior: Behavior normal          Thought Content:  Thought content normal          Judgment: Judgment normal              Beto Sanders MD  [place] [address]

## 2023-04-26 ENCOUNTER — HOSPITAL ENCOUNTER (OUTPATIENT)
Dept: RADIOLOGY | Age: 53
Discharge: HOME | End: 2023-04-26

## 2023-04-26 VITALS — WEIGHT: 130 LBS | BODY MASS INDEX: 25.52 KG/M2 | HEIGHT: 60 IN

## 2023-04-26 DIAGNOSIS — Z12.31 ENCOUNTER FOR SCREENING MAMMOGRAM FOR MALIGNANT NEOPLASM OF BREAST: ICD-10-CM

## 2023-04-27 ENCOUNTER — TELEPHONE (OUTPATIENT)
Dept: OBGYN CLINIC | Facility: CLINIC | Age: 53
End: 2023-04-27

## 2023-04-27 NOTE — TELEPHONE ENCOUNTER
Pt lmom - had a message in [employer] about mammogram and recommended further testing  Is concerned and would like to discuss and instruct how to make that appt

## 2023-04-27 NOTE — TELEPHONE ENCOUNTER
Reviewed results with pt and recommendations for US of L breast at this time  Order previously placed in system, phone number provided for pt to call and schedule  Pt aware once completed and results received provider will review  Pt verbalizes understanding and has no further questions at this time

## 2023-05-01 ENCOUNTER — TELEPHONE (OUTPATIENT)
Dept: OBGYN CLINIC | Facility: CLINIC | Age: 53
End: 2023-05-01

## 2023-05-01 ENCOUNTER — HOSPITAL ENCOUNTER (OUTPATIENT)
Dept: ULTRASOUND IMAGING | Facility: CLINIC | Age: 53
Discharge: HOME | End: 2023-05-01

## 2023-05-01 DIAGNOSIS — R92.8 ABNORMAL MAMMOGRAM: ICD-10-CM

## 2023-05-01 NOTE — TELEPHONE ENCOUNTER
PC to patient reviewed breast ultrasound shows benign appearing cyst   Discussed further follow up for breast with abus (consider pushing out to about 6 months) and already scheduled for mammogram next may  Patient reports reassurance and relief, she was also given results at radiology  Appreciates call and was encouraged to call with any questions or concerns

## 2023-06-01 ENCOUNTER — VBI (OUTPATIENT)
Dept: ADMINISTRATIVE | Facility: OTHER | Age: 53
End: 2023-06-01

## 2023-06-15 ENCOUNTER — RA CDI HCC (OUTPATIENT)
Dept: OTHER | Facility: HOSPITAL | Age: 53
End: 2023-06-15

## 2023-06-21 RX ORDER — MAGNESIUM HYDROXIDE/ALUMINUM HYDROXICE/SIMETHICONE 120; 1200; 1200 MG/30ML; MG/30ML; MG/30ML
5 SUSPENSION ORAL 2 TIMES DAILY
COMMUNITY
Start: 2023-03-09 | End: 2023-06-22

## 2023-06-21 RX ORDER — ACETAMINOPHEN AND CODEINE PHOSPHATE 300; 30 MG/1; MG/1
1 TABLET ORAL
COMMUNITY
Start: 2023-03-02 | End: 2023-06-22

## 2023-06-21 RX ORDER — PREDNISONE 10 MG/1
TABLET ORAL
COMMUNITY
Start: 2023-06-01

## 2023-06-21 RX ORDER — DEXAMETHASONE 0.5 MG/5ML
SOLUTION ORAL
COMMUNITY
Start: 2023-04-27

## 2023-06-21 RX ORDER — NALOXONE HYDROCHLORIDE 4 MG/.1ML
4 SPRAY NASAL
COMMUNITY
Start: 2023-03-02

## 2023-06-22 ENCOUNTER — OFFICE VISIT (OUTPATIENT)
Dept: FAMILY MEDICINE CLINIC | Facility: CLINIC | Age: 53
End: 2023-06-22
Payer: COMMERCIAL

## 2023-06-22 VITALS
OXYGEN SATURATION: 99 % | SYSTOLIC BLOOD PRESSURE: 110 MMHG | HEART RATE: 79 BPM | BODY MASS INDEX: 25.72 KG/M2 | HEIGHT: 60 IN | DIASTOLIC BLOOD PRESSURE: 72 MMHG | WEIGHT: 131 LBS

## 2023-06-22 DIAGNOSIS — L93.0 DISCOID LUPUS ERYTHEMATOSUS: ICD-10-CM

## 2023-06-22 DIAGNOSIS — Z85.828 HX OF BASAL CELL CARCINOMA: ICD-10-CM

## 2023-06-22 DIAGNOSIS — Z00.00 WELL ADULT EXAM: Primary | ICD-10-CM

## 2023-06-22 PROCEDURE — 99396 PREV VISIT EST AGE 40-64: CPT | Performed by: FAMILY MEDICINE

## 2023-06-22 PROCEDURE — 99213 OFFICE O/P EST LOW 20 MIN: CPT | Performed by: FAMILY MEDICINE

## 2023-07-07 ENCOUNTER — PATIENT MESSAGE (OUTPATIENT)
Dept: OBGYN CLINIC | Facility: CLINIC | Age: 53
End: 2023-07-07

## 2023-07-07 ENCOUNTER — OFFICE VISIT (OUTPATIENT)
Dept: FAMILY MEDICINE CLINIC | Facility: CLINIC | Age: 53
End: 2023-07-07
Payer: COMMERCIAL

## 2023-07-07 VITALS
HEIGHT: 60 IN | WEIGHT: 130 LBS | OXYGEN SATURATION: 99 % | HEART RATE: 71 BPM | SYSTOLIC BLOOD PRESSURE: 116 MMHG | DIASTOLIC BLOOD PRESSURE: 72 MMHG | RESPIRATION RATE: 16 BRPM | BODY MASS INDEX: 25.52 KG/M2

## 2023-07-07 DIAGNOSIS — Z48.02 VISIT FOR SUTURE REMOVAL: Primary | ICD-10-CM

## 2023-07-07 DIAGNOSIS — L93.0 DISCOID LUPUS ERYTHEMATOSUS: ICD-10-CM

## 2023-07-07 DIAGNOSIS — N60.02 BREAST CYST, LEFT: Primary | ICD-10-CM

## 2023-07-07 PROBLEM — B00.9 HSV-2 (HERPES SIMPLEX VIRUS 2) INFECTION: Status: RESOLVED | Noted: 2022-03-21 | Resolved: 2023-07-07

## 2023-07-07 PROCEDURE — 99212 OFFICE O/P EST SF 10 MIN: CPT | Performed by: FAMILY MEDICINE

## 2023-07-07 PROCEDURE — S0630 REMOVAL OF SUTURES: HCPCS | Performed by: FAMILY MEDICINE

## 2023-07-07 RX ORDER — HYDROXYCHLOROQUINE SULFATE 200 MG/1
TABLET, FILM COATED ORAL
COMMUNITY
Start: 2023-06-27

## 2023-07-07 NOTE — PROGRESS NOTES
Assessment/Plan:    1. Visit for suture removal  -     Suture removal    2. Discoid lupus erythematosus     did well     Subjective:      Patient ID: Keisha Garcia is a 47 y.o. female. 10 days ago skin biopsy done at [facility]  Suture removal    Date/Time: 7/7/2023 4:00 PM    Performed by: Fabienne Diaz MD  Authorized by: Fabienne Diaz MD  Universal Protocol:  Risks and benefits: risks, benefits and alternatives were discussed  Time out: Immediately prior to procedure a "time out" was called to verify the correct patient, procedure, equipment, support staff and site/side marked as required. Location:     Location:  Lower extremity    Lower extremity location:  Leg    Leg location:  R lower leg  Procedure details: Tools used:  Scissors and tweezers    Wound appearance:  No sign(s) of infection and clean  Post-procedure details:     Post-removal:  Band-Aid applied    Patient tolerance of procedure: Tolerated well, no immediate complications        The following portions of the patient's history were reviewed and updated as appropriate: allergies, current medications, past family history, past medical history, past social history, past surgical history and problem list.    Review of Systems      Objective:      /72 (BP Location: Left arm, Patient Position: Sitting, Cuff Size: Standard)   Pulse 71   Resp 16   Ht 5' (1.524 m)   Wt 59 kg (130 lb)   SpO2 99%   BMI 25.39 kg/m²     No visits with results within 2 Week(s) from this visit.    Latest known visit with results is:   Lab on 05/13/2022   Component Date Value   • Hepatitis C Ab 05/13/2022 Non-reactive    • Mycoplasma pneumo IgG 05/13/2022 238 (H)    • Mycoplasma pneumo IgM 05/13/2022 <770    • EBV VCA IgG 05/13/2022 158.0 (H)    • EBV VCA IgM 05/13/2022 <36.0    • EBV Nuclear Ag Ab 05/13/2022 78.6 (H)    • INTERPRETATION 05/13/2022 Comment           Physical Exam      1 suture removed right calf   Fabienne Diaz MD  [address] [place]

## 2023-07-10 DIAGNOSIS — F41.9 ANXIETY: ICD-10-CM

## 2023-07-10 RX ORDER — ALPRAZOLAM 0.25 MG/1
0.25 TABLET ORAL
Qty: 25 TABLET | Refills: 0 | Status: SHIPPED | OUTPATIENT
Start: 2023-07-10

## 2023-10-11 ENCOUNTER — CONSULT (OUTPATIENT)
Dept: SURGICAL ONCOLOGY | Facility: CLINIC | Age: 53
End: 2023-10-11
Payer: COMMERCIAL

## 2023-10-11 VITALS
BODY MASS INDEX: 25.13 KG/M2 | WEIGHT: 128 LBS | TEMPERATURE: 97.9 F | RESPIRATION RATE: 16 BRPM | SYSTOLIC BLOOD PRESSURE: 104 MMHG | HEART RATE: 75 BPM | HEIGHT: 60 IN | OXYGEN SATURATION: 98 % | DIASTOLIC BLOOD PRESSURE: 64 MMHG

## 2023-10-11 DIAGNOSIS — Z80.3 FAMILY HISTORY OF BREAST CANCER IN MOTHER: Primary | ICD-10-CM

## 2023-10-11 DIAGNOSIS — R92.30 DENSE BREAST TISSUE: ICD-10-CM

## 2023-10-11 DIAGNOSIS — N60.09 CYST OF BREAST, UNSPECIFIED LATERALITY: ICD-10-CM

## 2023-10-11 PROCEDURE — 99244 OFF/OP CNSLTJ NEW/EST MOD 40: CPT

## 2023-10-11 RX ORDER — FLUOROURACIL 50 MG/G
CREAM TOPICAL 2 TIMES DAILY
COMMUNITY
Start: 2023-09-25 | End: 2023-10-16

## 2023-10-11 NOTE — PROGRESS NOTES
Surgical Oncology Follow Up       [address] [facility] SURGICAL ONCOLOGY [place]NESTO  [address] [facility] [address] [facility]  [facility] [address]    Cristina Mccullough  1970  [id number]  [address] [facility] SURGICAL ONCOLOGY [facility] [address]    Chief Complaint   Patient presents with    Consult       Assessment/Plan:  1. Family history of breast cancer in mother  - annual breast exam with gyn  - annual mammogram  - Maintain a healthy weight, get regular exercise, eat a healthy diet, limit alcohol use    2. Dense breast tissue  - annual abus    3. Cyst of breast, unspecified laterality      Discussion/Summary: Patient is a 46-year-old female presenting today for consult for left breast cyst and dense breast tissue. She also has a family history of breast cancer in her mother diagnosed at 79years old. She was referred by her GYN. She had a bilateral screening mammogram on 4/26/2023 which was BI-RADS 0 category 3 density. Diagnostic u/s of the left breast was recommended with ultrasound and was performed on 5/1/2023. There is a 7 mm round simple cyst with circumscribed margins seen in the outer quadrant of the left breast at the 2 o'clock position 8 cm from the nipple. This is non-worrisome. I have calculated patients lifetime TC risk score for developing a breast cancer which is approx 17%. This is about an average risk. I have informed the patient that cysts are common within dense breast tissue. As long as she has dense tissue I recommend the ABUS in conjunction with the mammogram. This can be at the same time as the mammogram or 6 months after. She should continue with annual breast exam with gyn as well. We spoke about ways to decrease her risk for developing a breast cancer including maintaining a healthy weight, eating a healthy diet, limiting alcohol intake and exercising.  There were no concerns on her breast exam. She was instructed to call with questions or concerns in the future. All questions were answered today. History of Present Illness:     Oncology History    No history exists.        -Interval History: Patient is a 60-year-old female presenting today for consult for left breast cyst and dense breast tissue. She had a bilateral screening mammogram on 4/26/2023 which was BI-RADS 0 category 3 density. Diagnostic u/s of the left breast was recommended with ultrasound and was performed on 5/1/2023. There is a 7 mm round simple cyst with circumscribed margins seen in the outer quadrant of the left breast at the 2 o'clock position 8 cm from the nipple. She has a family hx of breast cancer with her mother at the age of 79. She has a personal hx of basal cell carcinoma. Age of menarche was 16years old. She has had 1 pregnancy and 1 live birth, first child was born at 33. She is pre- menopausal. She has never taken HRT. She is not of [employer] descent. She has never had genetic testing. Review of Systems:  Review of Systems   Constitutional:  Negative for activity change, appetite change, fatigue and unexpected weight change. HENT:  Positive for mouth sores. Respiratory:  Negative for cough and shortness of breath. Cardiovascular:  Negative for chest pain. Gastrointestinal:  Negative for abdominal pain, diarrhea, nausea and vomiting. Endocrine: Negative for heat intolerance. Musculoskeletal:  Negative for arthralgias, back pain and myalgias. Skin:  Negative for rash. Neurological:  Negative for weakness and headaches. Hematological:  Negative for adenopathy. Psychiatric/Behavioral:  The patient is nervous/anxious.         Patient Active Problem List   Diagnosis    Encounter for surveillance of contraceptives    Hx of oral aphthous ulcers    Migraine without status migrainosus, not intractable    Gastroesophageal reflux disease without esophagitis    Anxiety    Discoid lupus erythematosus    Hx of basal cell carcinoma    Family history of breast cancer in mother    Dense breast tissue     Past Medical History:   Diagnosis Date    Anxiety     Disease of thyroid gland     Erythema multiforme     GERD (gastroesophageal reflux disease)     Hx of basal cell carcinoma 6/22/2023    Hx of oral aphthous ulcers     Migraine headache     Osteoarthritis     Thyroid nodule     Ulcerative colitis ([address])     Undifferentiated connective tissue disease ([address])      Past Surgical History:   Procedure Laterality Date    EGD AND COLONOSCOPY N/A 10/4/2018    Procedure: EGD AND COLONOSCOPY;  Surgeon: Genetta Klinefelter, MD;  [facility];   Service: Gastroenterology    THYROIDECTOMY Right      Family History   Problem Relation Age of Onset    Breast cancer Mother 79    Coronary artery disease Mother     Cancer Mother     Heart disease Mother     Lymphoma Father     Diabetes Maternal Grandmother     Lymphoma Maternal Grandmother     Cancer Maternal Grandmother     No Known Problems Maternal Grandfather     No Known Problems Paternal Grandmother     No Known Problems Paternal Grandfather     No Known Problems Brother     No Known Problems Brother     No Known Problems Brother     No Known Problems Brother     No Known Problems Son     No Known Problems Maternal Aunt     No Known Problems Paternal Aunt      Social History     Socioeconomic History    Marital status: /Civil Union     Spouse name: Not on file    Number of children: Not on file    Years of education: Not on file    Highest education level: Not on file   Occupational History    Not on file   Tobacco Use    Smoking status: Never    Smokeless tobacco: Never   Vaping Use    Vaping Use: Never used   Substance and Sexual Activity    Alcohol use: Yes     Comment: occassional     Drug use: No    Sexual activity: Yes     Partners: Male   Other Topics Concern    Not on file   Social History Narrative    Not on file     Social Determinants of Health     Financial Resource Strain: Not on file   Food Insecurity: Not on file   Transportation Needs: Not on file   Physical Activity: Not on file   Stress: Not on file   Social Connections: Not on file   Intimate Partner Violence: Not on file   Housing Stability: Not on file       Current Outpatient Medications:     ALPRAZolam (XANAX) 0.25 mg tablet, Take 1 tablet (0.25 mg total) by mouth daily at bedtime as needed for anxiety, Disp: 25 tablet, Rfl: 0    Cholecalciferol (VITAMIN D3 PO), Take by mouth, Disp: , Rfl:     clobetasol (TEMOVATE) 0.05 % ointment, , Disp: , Rfl:     Cyanocobalamin (VITAMIN B12 PO), Take by mouth, Disp: , Rfl:     hydroxychloroquine (PLAQUENIL) 200 mg tablet, 300 MG DAILY (1.5 TABLETS PER DAY), 1 TABLET IN AM, HALF TABLET IN PM, Disp: , Rfl:     mupirocin (BACTROBAN) 2 % ointment, Apply 1 Application topically 2 (two) times a day, Disp: , Rfl:     triamcinolone (KENALOG) 0.1 % ointment, Apply 1 Application topically 2 (two) times a day, Disp: , Rfl:   Allergies   Allergen Reactions    Doxycycline Rash    Keflex [Cephalexin] Rash     Vitals:    10/11/23 1458   Temp: 97.9 °F (36.6 °C)       Physical Exam  Constitutional:       General: She is not in acute distress. Appearance: Normal appearance. Cardiovascular:      Rate and Rhythm: Normal rate and regular rhythm. Pulses: Normal pulses. Heart sounds: Normal heart sounds. Pulmonary:      Effort: Pulmonary effort is normal.      Breath sounds: Normal breath sounds. Chest:      Chest wall: No mass. Breasts:     Right: No swelling, bleeding, inverted nipple, mass, nipple discharge, skin change or tenderness. Left: No swelling, bleeding, inverted nipple, mass, nipple discharge, skin change or tenderness. Abdominal:      General: Abdomen is flat. Palpations: Abdomen is soft. Lymphadenopathy:      Upper Body:      Right upper body: No supraclavicular, axillary or pectoral adenopathy.       Left upper body: No supraclavicular, axillary or pectoral adenopathy. Skin:     General: Skin is warm. Neurological:      General: No focal deficit present. Mental Status: She is alert and oriented to person, place, and time. Psychiatric:         Mood and Affect: Mood normal.         Behavior: Behavior normal.           Results:    Imaging  No results found. I reviewed the above imaging data. Advance Care Planning/Advance Directives:  Discussed disease status, cancer treatment plans and/or cancer treatment goals with the patient.

## 2023-10-16 ENCOUNTER — PATIENT MESSAGE (OUTPATIENT)
Dept: OBGYN CLINIC | Facility: CLINIC | Age: 53
End: 2023-10-16

## 2023-10-16 DIAGNOSIS — R92.30 DENSE BREASTS: Primary | ICD-10-CM

## 2023-10-16 DIAGNOSIS — R92.2 DENSE BREASTS: Primary | ICD-10-CM

## 2023-10-16 DIAGNOSIS — R92.30 BREAST DENSITY: ICD-10-CM

## 2023-10-25 ENCOUNTER — IMMUNIZATIONS (OUTPATIENT)
Dept: FAMILY MEDICINE CLINIC | Facility: CLINIC | Age: 53
End: 2023-10-25
Payer: COMMERCIAL

## 2023-10-25 DIAGNOSIS — Z23 ENCOUNTER FOR IMMUNIZATION: Primary | ICD-10-CM

## 2023-10-25 PROCEDURE — 90471 IMMUNIZATION ADMIN: CPT

## 2023-10-25 PROCEDURE — 90686 IIV4 VACC NO PRSV 0.5 ML IM: CPT

## 2023-11-28 ENCOUNTER — TELEPHONE (OUTPATIENT)
Age: 53
End: 2023-11-28

## 2023-11-28 DIAGNOSIS — K12.0 APHTHOUS ULCER: Primary | ICD-10-CM

## 2023-11-28 RX ORDER — DEXAMETHASONE 0.5 MG/5ML
ELIXIR ORAL
Qty: 237 ML | Refills: 0 | Status: SHIPPED | OUTPATIENT
Start: 2023-11-28

## 2023-11-28 NOTE — TELEPHONE ENCOUNTER
Pt called to say she is having a mouth ulcer flair up and would like a refill on dexamethasone 0.5 mg/5 mL solution. Pt also wants to know if her FMLA forms have been completed and faxed to [facility]. They called her yesterday, her deadline is 12/2/23. Please let pt know regarding both of these issues. She is currently having a problem with [employer] messages and does not want to miss a notification.

## 2023-11-30 ENCOUNTER — RA CDI HCC (OUTPATIENT)
Dept: OTHER | Facility: HOSPITAL | Age: 53
End: 2023-11-30

## 2023-11-30 NOTE — PROGRESS NOTES
[address] [facility] coding opportunities       Chart reviewed, no opportunity found: CHART REVIEWED, NO OPPORTUNITY FOUND        Patients Insurance        Commercial Insurance: Aleksander Almaraz

## 2023-12-07 ENCOUNTER — OFFICE VISIT (OUTPATIENT)
Dept: FAMILY MEDICINE CLINIC | Facility: CLINIC | Age: 53
End: 2023-12-07
Payer: COMMERCIAL

## 2023-12-07 VITALS
WEIGHT: 126 LBS | BODY MASS INDEX: 24.74 KG/M2 | HEART RATE: 85 BPM | DIASTOLIC BLOOD PRESSURE: 78 MMHG | HEIGHT: 60 IN | OXYGEN SATURATION: 98 % | SYSTOLIC BLOOD PRESSURE: 125 MMHG

## 2023-12-07 DIAGNOSIS — G43.909 MIGRAINE WITHOUT STATUS MIGRAINOSUS, NOT INTRACTABLE, UNSPECIFIED MIGRAINE TYPE: ICD-10-CM

## 2023-12-07 DIAGNOSIS — R10.32 LLQ PAIN: ICD-10-CM

## 2023-12-07 DIAGNOSIS — R10.13 EPIGASTRIC PAIN: Primary | ICD-10-CM

## 2023-12-07 DIAGNOSIS — L93.0 DISCOID LUPUS ERYTHEMATOSUS: ICD-10-CM

## 2023-12-07 PROCEDURE — 99214 OFFICE O/P EST MOD 30 MIN: CPT | Performed by: FAMILY MEDICINE

## 2023-12-07 RX ORDER — OMEPRAZOLE 40 MG/1
40 CAPSULE, DELAYED RELEASE ORAL DAILY
Qty: 21 CAPSULE | Refills: 0 | Status: SHIPPED | OUTPATIENT
Start: 2023-12-07

## 2023-12-07 NOTE — PROGRESS NOTES
Assessment/Plan:  1. Epigastric pain  -     omeprazole (PriLOSEC) 40 MG capsule; Take 1 capsule (40 mg total) by mouth daily    2. Discoid lupus erythematosus    3. Migraine without status migrainosus, not intractable, unspecified migraine type    4. LLQ pain    1. Headache  She was recommended to try Excedrin. I encouraged the patient to consume better quality water like Smartwater or [employer]. I also recommended taking an allergy pill like Zyrtec, Claritin, or Allegra every day for 2 weeks with Excedrin. 2. Lower left quadrant abdominal pain. Educated the patient as to the possible reason for the pain. She was recommended to take simethicone or Gas-X as needed. She was prescribed omeprazole once a day for 3 weeks, 30 minutes before breakfast.  Advised the patient that she can take hydroxychloroquine along with the omeprazole. Follow-up  The patient will follow up in 6 months. Subjective:      Patient ID: Evelyn Horn is a 47 y.o. female. Evelyn Horn is a 60-year-old female is her for a follow-up visit. She consents to the use of ANGELITA. She has an upcoming appointment in 02/2024. Recently, she has been frequently experiencing severe headaches. Where she just wants to close her eyes and do nothing. The duration of the pain depends on whether she is at home or working. She feels the need to take Advil all the time. She takes 2 Advil at a time mostly twice daily. She limits it up to 3 times daily. She denies she had taken Excedrin in the past and is inclined to try it. She also reports enduring continuous intense pain in her lower left quadrant of the abdomen  Where she is unable to stand straight due to pain and prefers to hunch over instead. She states that she has been enduring it for the past 2 months. She denies trying Tums. She was consuming ginger ale instead. She was inquiring if she can take omeprazole along with the hydroxychloroquine.     She was supposed to go to her doctor in [place] 3 weeks ago. However, she had to cancel her doctor's appointment and that is when she decided to do the FMLA. She was told to continue doing the AVS ultrasound, which she has scheduled for 01/2024. The following portions of the patient's history were reviewed and updated as appropriate: allergies, current medications, past family history, past medical history, past social history, past surgical history and problem list.    Review of Systems   Constitutional: Negative for fever and unexpected weight change. HENT: Negative for nosebleeds and trouble swallowing. Head: Positive for headache. Eyes: Negative for visual disturbance. Respiratory: Negative for chest tightness and shortness of breath. Cardiovascular: Negative for chest pain, palpitations and leg swelling. Gastrointestinal: Positive for lower left quadrant abdominal pain, Negative for constipation, diarrhea and nausea. Endocrine: Negative for cold intolerance. Genitourinary: Negative for dysuria and urgency. Musculoskeletal: Negative for joint swelling and myalgias. Skin: Negative for rash. Neurological: Negative for tremors, seizures and syncope. Hematological: Does not bruise/bleed easily. Psychiatric/Behavioral: Negative for hallucinations and suicidal ideas. Objective:     /78   Pulse 85   Ht 5' (1.524 m)   Wt 57.2 kg (126 lb)   SpO2 98%   BMI 24.61 kg/m²    Physical Exam  Vital Signs  Blood pressure: 125/78 mmHg  Weight: 126 pounds  Vitals and nursing note reviewed. Constitutional:     Appearance: Well-developed. HENT:     Head: Normocephalic and atraumatic. Cardiovascular:     Rate and Rhythm: Normal rate and regular rhythm. Heart sounds: Normal heart sounds. No murmur heard. Pulmonary:     Effort: Pulmonary effort is normal.     Breath sounds: Normal breath sounds. No wheezing or rales. Abdominal:     General: Bowel sounds are normal. There is no distension.      Palpations: Abdomen is soft.     Tenderness: There is no abdominal tenderness. Musculoskeletal:     General: No tenderness. Normal range of motion. Cervical back: Normal range of motion and neck supple. Lymphadenopathy:     Cervical: No cervical adenopathy. Skin:     General: Skin is warm and dry. Capillary Refill: Capillary refill takes less than 2 seconds. Findings: No rash. Neurological:     Mental Status: Alert and oriented to person, place, and time. Cranial Nerves: No cranial nerve deficit. Sensory: No sensory deficit. Motor: No abnormal muscle tone. Psychiatric:     Behavior: Behavior normal.     Thought Content: Thought content normal.     Judgment: Judgment normal.        No visits with results within 2 Week(s) from this visit. Latest known visit with results is:   Lab on 05/13/2022   Component Date Value   • Hepatitis C Ab 05/13/2022 Non-reactive    • Mycoplasma pneumo IgG 05/13/2022 238 (H)    • Mycoplasma pneumo IgM 05/13/2022 <770    • EBV VCA IgG 05/13/2022 158.0 (H)    • EBV VCA IgM 05/13/2022 <36.0    • EBV Nuclear Ag Ab 05/13/2022 78.6 (H)    • INTERPRETATION 05/13/2022 Comment      I have personally reviewed results with the patient. Opal Payne MD   [facility]     Transcribed for Opal Payne MD, by Amandeep Fritz on 12/08/23 at 5:44 AM. Powered by [employer].

## 2024-01-07 DIAGNOSIS — R10.13 EPIGASTRIC PAIN: ICD-10-CM

## 2024-01-12 RX ORDER — OMEPRAZOLE 40 MG/1
40 CAPSULE, DELAYED RELEASE ORAL DAILY
Qty: 90 CAPSULE | Refills: 1 | Status: SHIPPED | OUTPATIENT
Start: 2024-01-12

## 2024-01-23 ENCOUNTER — HOSPITAL ENCOUNTER (OUTPATIENT)
Dept: ULTRASOUND IMAGING | Facility: CLINIC | Age: 54
Discharge: HOME | End: 2024-01-23
Payer: COMMERCIAL

## 2024-01-23 VITALS — HEIGHT: 60 IN | WEIGHT: 122 LBS | BODY MASS INDEX: 23.95 KG/M2

## 2024-01-23 DIAGNOSIS — R92.30 DENSE BREASTS: ICD-10-CM

## 2024-01-23 DIAGNOSIS — R92.30 BREAST DENSITY: ICD-10-CM

## 2024-01-23 DIAGNOSIS — R92.2 DENSE BREASTS: ICD-10-CM

## 2024-01-23 PROCEDURE — 76641 ULTRASOUND BREAST COMPLETE: CPT

## 2024-04-04 ENCOUNTER — ANNUAL EXAM (OUTPATIENT)
Dept: OBGYN CLINIC | Facility: CLINIC | Age: 54
End: 2024-04-04
Payer: COMMERCIAL

## 2024-04-04 VITALS
HEIGHT: 60 IN | WEIGHT: 132 LBS | BODY MASS INDEX: 25.91 KG/M2 | SYSTOLIC BLOOD PRESSURE: 112 MMHG | DIASTOLIC BLOOD PRESSURE: 68 MMHG

## 2024-04-04 DIAGNOSIS — N92.0 MENORRHAGIA WITH REGULAR CYCLE: ICD-10-CM

## 2024-04-04 DIAGNOSIS — Z01.419 ENCOUNTER FOR GYNECOLOGICAL EXAMINATION WITHOUT ABNORMAL FINDING: Primary | ICD-10-CM

## 2024-04-04 PROCEDURE — G0145 SCR C/V CYTO,THINLAYER,RESCR: HCPCS | Performed by: OBSTETRICS & GYNECOLOGY

## 2024-04-04 PROCEDURE — G0476 HPV COMBO ASSAY CA SCREEN: HCPCS | Performed by: OBSTETRICS & GYNECOLOGY

## 2024-04-04 PROCEDURE — S0612 ANNUAL GYNECOLOGICAL EXAMINA: HCPCS | Performed by: OBSTETRICS & GYNECOLOGY

## 2024-04-04 RX ORDER — TRANEXAMIC ACID 650 MG/1
1300 TABLET ORAL 3 TIMES DAILY PRN
Qty: 30 TABLET | Refills: 1 | Status: SHIPPED | OUTPATIENT
Start: 2024-04-04

## 2024-04-04 NOTE — PROGRESS NOTES
Subjective      Lisa Padilla is a 54 y.o. G1, P1 female who presents for annual well woman exam. Periods are less regular about every month sometimes twice in a month at beginning and end with beginning even heavier than before and generally less regular, lasting 6 days 3-4 heavy days with clots and cramps and heavy flow.  Patient does try some Advil to lighten flow and improve cramps does not do much for flow. No intermenstrual bleeding, spotting, or discharge.  Patient reports No hot flashes/night sweats, No pain problems intercourse, No vaginal dryness, sleeping fairly well.    Current contraception: condoms  History of abnormal Pap smear: no  Family history of uterine or ovarian cancer: no  Regular self breast exam: no  History of abnormal mammogram: yes -follow-up shows benign cyst doing every 6 month mammogram and ABUS  Family history of breast cancer: yes -mother with breast cancer    Menstrual History:  OB History          1    Para   1    Term   1            AB        Living   1         SAB        IAB        Ectopic        Multiple        Live Births   1                Patient's last menstrual period was 2024 (exact date).       The following portions of the patient's history were reviewed and updated as appropriate: allergies, current medications, past family history, past medical history, past social history, past surgical history, and problem list.  Past Medical History:   Diagnosis Date    Anxiety     Disease of thyroid gland     Erythema multiforme     GERD (gastroesophageal reflux disease)     Hx of basal cell carcinoma 2023    Hx of oral aphthous ulcers     Lupus (HCC)     Migraine headache     Osteoarthritis     Thyroid nodule     Ulcerative colitis (HCC)     Undifferentiated connective tissue disease (HCC)      Past Surgical History:   Procedure Laterality Date    EGD AND COLONOSCOPY N/A 10/4/2018    Procedure: EGD AND COLONOSCOPY;  Surgeon: Heidy Bernard MD;  [facility];  Service: Gastroenterology    THYROIDECTOMY Right      OB History          1    Para   1    Term   1            AB        Living   1         SAB        IAB        Ectopic        Multiple        Live Births   1                 Review of Systems  Review of Systems   Constitutional: Negative.  Negative for chills and fever.   HENT: Negative.  Negative for ear pain and sore throat.    Eyes: Negative.  Negative for pain and visual disturbance.   Respiratory: Negative.  Negative for cough and shortness of breath.    Cardiovascular: Negative.  Negative for chest pain and palpitations.   Gastrointestinal: Negative.  Negative for abdominal pain and vomiting.   Genitourinary: Negative.  Negative for dysuria and hematuria.   Musculoskeletal: Negative.  Negative for arthralgias and back pain.        Righ hip pain   Skin: Negative.  Negative for color change and rash.   Neurological: Negative.  Negative for seizures and syncope.   All other systems reviewed and are negative.    Objective      /68 (BP Location: Left arm, Patient Position: Sitting, Cuff Size: Standard)   Ht 5' (1.524 m)   Wt 59.9 kg (132 lb)   LMP 2024 (Exact Date)   Breastfeeding No   BMI 25.78 kg/m²   Vitals:    24 1528   BP: 112/68   BP Location: Left arm   Patient Position: Sitting   Cuff Size: Standard   Weight: 59.9 kg (132 lb)   Height: 5' (1.524 m)     General:   alert and oriented, in no acute distress   Heart: regular rate and rhythm, S1, S2 normal, no murmur, click, rub or gallop   Lungs: clear to auscultation bilaterally   Abdomen: soft, non-tender, without masses or organomegaly   Vulva: normal   Vagina: normal mucosa   Cervix: no bleeding following Pap, no cervical motion tenderness, and no lesions   Uterus: normal size, mobile, non-tender   Adnexa: normal adnexa and no mass, fullness, tenderness   Breast inspection negative, no nipple discharge or bleeding, no masses or nodularity palpable  Rectal  negative, stool guaiac negative  Thyroid normal no masses or nodules     Assessment   54-year-old G1, P1 here for annual exam.  Pap December 2020 normal, last mammogram April 2023 dense breasts, patient had January 2020 for ABUS will plan for every 6 month scheduling already scheduled for next mammogram.     Plan   Pap with cotesting performed, plan for mammogram followed by ABUS 6 months later followed by 6 months later next mammogram for screening at this time.  Briefly reviewed options for evaluation.  Also seeing dermatology regularly counseled and sent in prescription for Lysteda to help control heavy menses.  Return in 1 year or sooner as needed

## 2024-04-12 LAB
LAB AP GYN PRIMARY INTERPRETATION: NORMAL
Lab: NORMAL

## 2024-06-27 DIAGNOSIS — F41.9 ANXIETY: ICD-10-CM

## 2024-06-28 RX ORDER — ALPRAZOLAM 0.25 MG/1
TABLET ORAL
Qty: 25 TABLET | Refills: 0 | Status: SHIPPED | OUTPATIENT
Start: 2024-06-28

## 2024-07-02 ENCOUNTER — APPOINTMENT (OUTPATIENT)
Dept: LAB | Facility: MEDICAL CENTER | Age: 54
End: 2024-07-02

## 2024-07-02 ENCOUNTER — APPOINTMENT (OUTPATIENT)
Dept: URGENT CARE | Facility: MEDICAL CENTER | Age: 54
End: 2024-07-02

## 2024-07-02 DIAGNOSIS — Z02.1 PHYSICAL EXAM, PRE-EMPLOYMENT: ICD-10-CM

## 2024-07-02 PROCEDURE — 86480 TB TEST CELL IMMUN MEASURE: CPT

## 2024-07-02 PROCEDURE — 90715 TDAP VACCINE 7 YRS/> IM: CPT

## 2024-07-03 LAB
GAMMA INTERFERON BACKGROUND BLD IA-ACNC: 0.01 IU/ML
M TB IFN-G BLD-IMP: NEGATIVE
M TB IFN-G CD4+ BCKGRND COR BLD-ACNC: 0.04 IU/ML
M TB IFN-G CD4+ BCKGRND COR BLD-ACNC: 0.06 IU/ML
MITOGEN IGNF BCKGRD COR BLD-ACNC: 9.99 IU/ML

## 2024-07-08 RX ORDER — CLOBETASOL PROPIONATE 0.5 MG/G
OINTMENT TOPICAL
COMMUNITY
Start: 2024-04-30

## 2024-07-09 ENCOUNTER — OFFICE VISIT (OUTPATIENT)
Dept: FAMILY MEDICINE CLINIC | Facility: CLINIC | Age: 54
End: 2024-07-09
Payer: COMMERCIAL

## 2024-07-09 VITALS
HEART RATE: 74 BPM | HEIGHT: 60 IN | OXYGEN SATURATION: 97 % | SYSTOLIC BLOOD PRESSURE: 118 MMHG | RESPIRATION RATE: 16 BRPM | BODY MASS INDEX: 25.32 KG/M2 | DIASTOLIC BLOOD PRESSURE: 70 MMHG | WEIGHT: 129 LBS

## 2024-07-09 DIAGNOSIS — E61.1 IRON DEFICIENCY: ICD-10-CM

## 2024-07-09 DIAGNOSIS — R14.0 BLOATING: ICD-10-CM

## 2024-07-09 DIAGNOSIS — L93.0 DISCOID LUPUS ERYTHEMATOSUS: ICD-10-CM

## 2024-07-09 DIAGNOSIS — Z00.00 WELL ADULT EXAM: Primary | ICD-10-CM

## 2024-07-09 DIAGNOSIS — F41.9 ANXIETY: ICD-10-CM

## 2024-07-09 DIAGNOSIS — R10.13 DYSPEPSIA: ICD-10-CM

## 2024-07-09 PROCEDURE — 99214 OFFICE O/P EST MOD 30 MIN: CPT | Performed by: FAMILY MEDICINE

## 2024-07-09 PROCEDURE — 99396 PREV VISIT EST AGE 40-64: CPT | Performed by: FAMILY MEDICINE

## 2024-07-09 NOTE — PROGRESS NOTES
Ambulatory Visit  Name: Lisa Padilla      : 1970      MRN: [id number]  Encounter Provider: Bao Cline MD  Encounter Date: 2024   Encounter department: [facility]    Assessment & Plan  Well adult exam    Orders:    CBC and differential    Comprehensive metabolic panel    Iron Panel (Includes Ferritin, Iron Sat%, Iron, and TIBC)    Iron deficiency    Orders:    Iron Panel (Includes Ferritin, Iron Sat%, Iron, and TIBC)    Discoid lupus erythematosus  Follows with [facility]       Anxiety  Xanax as needed        Dyspepsia  Zenwise digestive enzymes        Bloating  Same as above           Assessment & Plan  1. Followup visit.  The patient's blood pressure is well-regulated, and she has experienced a slight weight loss. Laboratory tests will be ordered to monitor her iron, blood count, and kidney and liver function.    2. Constipation.  The patient was advised to administer 2 capfuls of MiraLAX at a time, mixing it with Gatorade, for a total of 32 ounces. Follows with 2 caps into 32 oz of water then repeat again   The use of digestive enzymes was recommended.    Follow-up  A follow-up appointment is scheduled for 6 months from now.        History of Present Illness     History of Present Illness  The patient is a 54-year-old female who presents for a follow-up.    The patient was last evaluated in 2023, during which a breast biopsy was performed, revealing a palpable mass. She undergoes biannual follow-ups with Dr. Tan, with the most recent visit being on 2024. A follow-up appointment is scheduled for 2024. Dr. Tan conducted a urinalysis to assess her kidney function, which yielded negative results. The patient's A1c levels were within the normal range, however, her LDL cholesterol was 101. She has not menstruated for the past 3 months. Dr. Lizama prescribed medication in the event of severe menstrual symptoms, but the patient did not receive it. She continues to  experience gastrointestinal issues, including constant bloating, discomfort, and nausea, which have been ongoing for several months. She reports early satiety and nausea upon eating. She has attempted to modify her diet and has tried probiotics, but found them ineffective. She also experienced constipation for several days during a vacation. She has a history of gastrointestinal issues, which have since improved. She underwent a colonoscopy approximately 7 to 8 years ago. She has recently started taking fiber gummies.   She has received her DTaP vaccine.     Review of Systems   Constitutional:  Negative for fever and unexpected weight change.   HENT:  Negative for nosebleeds and trouble swallowing.    Eyes:  Negative for visual disturbance.   Respiratory:  Negative for chest tightness and shortness of breath.    Cardiovascular:  Negative for chest pain, palpitations and leg swelling.   Gastrointestinal:  Negative for abdominal pain, constipation, diarrhea and nausea.   Endocrine: Negative for cold intolerance.   Genitourinary:  Negative for dysuria and urgency.   Musculoskeletal:  Negative for joint swelling and myalgias.   Skin:  Negative for rash.   Neurological:  Negative for tremors, seizures and syncope.   Hematological:  Does not bruise/bleed easily.   Psychiatric/Behavioral:  Negative for hallucinations and suicidal ideas.      Objective     /70   Pulse 74   Resp 16   Ht 5' (1.524 m)   Wt 58.5 kg (129 lb)   SpO2 97%   BMI 25.19 kg/m²     Physical Exam  Vital Signs  Vitals show a blood pressure of 118/70. Weight is 129.  Physical Exam  Vitals and nursing note reviewed.   Constitutional:       Appearance: She is well-developed.   HENT:      Head: Normocephalic and atraumatic.      Right Ear: External ear normal.      Left Ear: External ear normal.      Nose: Nose normal.   Eyes:      Conjunctiva/sclera: Conjunctivae normal.      Pupils: Pupils are equal, round, and reactive to light.    Cardiovascular:      Rate and Rhythm: Normal rate and regular rhythm.      Heart sounds: Normal heart sounds. No murmur heard.  Pulmonary:      Effort: Pulmonary effort is normal.      Breath sounds: Normal breath sounds. No wheezing.   Abdominal:      General: Bowel sounds are normal.      Palpations: Abdomen is soft.   Musculoskeletal:         General: No tenderness. Normal range of motion.      Cervical back: Normal range of motion and neck supple.   Lymphadenopathy:      Cervical: No cervical adenopathy.   Skin:     General: Skin is warm and dry.      Capillary Refill: Capillary refill takes less than 2 seconds.   Neurological:      Mental Status: She is alert and oriented to person, place, and time.   Psychiatric:         Behavior: Behavior normal.         Thought Content: Thought content normal.         Judgment: Judgment normal.

## 2024-07-10 ENCOUNTER — HOSPITAL ENCOUNTER (OUTPATIENT)
Dept: RADIOLOGY | Age: 54
Discharge: HOME | End: 2024-07-10
Payer: COMMERCIAL

## 2024-07-10 VITALS — HEIGHT: 60 IN | WEIGHT: 129 LBS | BODY MASS INDEX: 25.32 KG/M2

## 2024-07-10 DIAGNOSIS — Z12.31 VISIT FOR SCREENING MAMMOGRAM: ICD-10-CM

## 2024-07-10 PROCEDURE — 77063 BREAST TOMOSYNTHESIS BI: CPT

## 2024-07-10 PROCEDURE — 77067 SCR MAMMO BI INCL CAD: CPT

## 2024-08-28 ENCOUNTER — TELEPHONE (OUTPATIENT)
Age: 54
End: 2024-08-28

## 2024-08-31 ENCOUNTER — APPOINTMENT (OUTPATIENT)
Dept: LAB | Facility: CLINIC | Age: 54
End: 2024-08-31
Payer: COMMERCIAL

## 2024-08-31 LAB
ALBUMIN SERPL BCG-MCNC: 4.2 G/DL (ref 3.5–5)
ALP SERPL-CCNC: 79 U/L (ref 34–104)
ALT SERPL W P-5'-P-CCNC: 8 U/L (ref 7–52)
ANION GAP SERPL CALCULATED.3IONS-SCNC: 5 MMOL/L (ref 4–13)
AST SERPL W P-5'-P-CCNC: 13 U/L (ref 13–39)
BASOPHILS # BLD AUTO: 0.06 THOUSANDS/ÂΜL (ref 0–0.1)
BASOPHILS NFR BLD AUTO: 1 % (ref 0–1)
BILIRUB SERPL-MCNC: 0.5 MG/DL (ref 0.2–1)
BUN SERPL-MCNC: 16 MG/DL (ref 5–25)
CALCIUM SERPL-MCNC: 8.7 MG/DL (ref 8.4–10.2)
CHLORIDE SERPL-SCNC: 104 MMOL/L (ref 96–108)
CO2 SERPL-SCNC: 28 MMOL/L (ref 21–32)
CREAT SERPL-MCNC: 0.8 MG/DL (ref 0.6–1.3)
EOSINOPHIL # BLD AUTO: 0.1 THOUSAND/ÂΜL (ref 0–0.61)
EOSINOPHIL NFR BLD AUTO: 2 % (ref 0–6)
ERYTHROCYTE [DISTWIDTH] IN BLOOD BY AUTOMATED COUNT: 12.3 % (ref 11.6–15.1)
FERRITIN SERPL-MCNC: 14 NG/ML (ref 11–307)
GFR SERPL CREATININE-BSD FRML MDRD: 84 ML/MIN/1.73SQ M
GLUCOSE P FAST SERPL-MCNC: 84 MG/DL (ref 65–99)
HCT VFR BLD AUTO: 38.7 % (ref 34.8–46.1)
HGB BLD-MCNC: 13.1 G/DL (ref 11.5–15.4)
IMM GRANULOCYTES # BLD AUTO: 0.01 THOUSAND/UL (ref 0–0.2)
IMM GRANULOCYTES NFR BLD AUTO: 0 % (ref 0–2)
IRON SATN MFR SERPL: 24 % (ref 15–50)
IRON SERPL-MCNC: 82 UG/DL (ref 50–212)
LYMPHOCYTES # BLD AUTO: 2.24 THOUSANDS/ÂΜL (ref 0.6–4.47)
LYMPHOCYTES NFR BLD AUTO: 36 % (ref 14–44)
MCH RBC QN AUTO: 30.3 PG (ref 26.8–34.3)
MCHC RBC AUTO-ENTMCNC: 33.9 G/DL (ref 31.4–37.4)
MCV RBC AUTO: 89 FL (ref 82–98)
MONOCYTES # BLD AUTO: 0.47 THOUSAND/ÂΜL (ref 0.17–1.22)
MONOCYTES NFR BLD AUTO: 8 % (ref 4–12)
NEUTROPHILS # BLD AUTO: 3.37 THOUSANDS/ÂΜL (ref 1.85–7.62)
NEUTS SEG NFR BLD AUTO: 53 % (ref 43–75)
NRBC BLD AUTO-RTO: 0 /100 WBCS
PLATELET # BLD AUTO: 233 THOUSANDS/UL (ref 149–390)
PMV BLD AUTO: 9.4 FL (ref 8.9–12.7)
POTASSIUM SERPL-SCNC: 4.2 MMOL/L (ref 3.5–5.3)
PROT SERPL-MCNC: 7.1 G/DL (ref 6.4–8.4)
RBC # BLD AUTO: 4.33 MILLION/UL (ref 3.81–5.12)
SODIUM SERPL-SCNC: 137 MMOL/L (ref 135–147)
TIBC SERPL-MCNC: 335 UG/DL (ref 250–450)
UIBC SERPL-MCNC: 253 UG/DL (ref 155–355)
WBC # BLD AUTO: 6.25 THOUSAND/UL (ref 4.31–10.16)

## 2024-11-18 DIAGNOSIS — K12.0 APHTHOUS ULCER: ICD-10-CM

## 2024-11-18 NOTE — TELEPHONE ENCOUNTER
Message sent via [employer].   Can you please send a refill of the dexamethasone 0.5 MG/5ML elixir to the pharmacy for me?      Please note my pharmacy has changed to [facility].      Thank you!!

## 2024-11-19 RX ORDER — DEXAMETHASONE 0.5 MG/5ML
ELIXIR ORAL
Qty: 237 ML | Refills: 0 | Status: SHIPPED | OUTPATIENT
Start: 2024-11-19

## 2024-11-23 ENCOUNTER — APPOINTMENT (OUTPATIENT)
Dept: LAB | Facility: CLINIC | Age: 54
End: 2024-11-23
Payer: COMMERCIAL

## 2024-11-23 DIAGNOSIS — L93.0 LUPUS ERYTHEMATOSUS, UNSPECIFIED FORM: ICD-10-CM

## 2024-11-23 LAB
ANION GAP SERPL CALCULATED.3IONS-SCNC: 6 MMOL/L (ref 4–13)
BUN SERPL-MCNC: 13 MG/DL (ref 5–25)
CALCIUM SERPL-MCNC: 8.7 MG/DL (ref 8.4–10.2)
CHLORIDE SERPL-SCNC: 104 MMOL/L (ref 96–108)
CHOLEST SERPL-MCNC: 176 MG/DL (ref ?–200)
CO2 SERPL-SCNC: 27 MMOL/L (ref 21–32)
CREAT SERPL-MCNC: 0.89 MG/DL (ref 0.6–1.3)
EST. AVERAGE GLUCOSE BLD GHB EST-MCNC: 108 MG/DL
GFR SERPL CREATININE-BSD FRML MDRD: 74 ML/MIN/1.73SQ M
GLUCOSE P FAST SERPL-MCNC: 90 MG/DL (ref 65–99)
HBA1C MFR BLD: 5.4 %
HDLC SERPL-MCNC: 68 MG/DL
INSULIN SERPL-ACNC: 4.46 UIU/ML (ref 1.9–23)
LDLC SERPL CALC-MCNC: 95 MG/DL (ref 0–100)
NONHDLC SERPL-MCNC: 108 MG/DL
POTASSIUM SERPL-SCNC: 4.3 MMOL/L (ref 3.5–5.3)
SODIUM SERPL-SCNC: 137 MMOL/L (ref 135–147)
TRIGL SERPL-MCNC: 65 MG/DL (ref ?–150)

## 2024-11-23 PROCEDURE — 82172 ASSAY OF APOLIPOPROTEIN: CPT

## 2024-11-23 PROCEDURE — 36415 COLL VENOUS BLD VENIPUNCTURE: CPT

## 2024-11-23 PROCEDURE — 80048 BASIC METABOLIC PNL TOTAL CA: CPT

## 2024-11-23 PROCEDURE — 83525 ASSAY OF INSULIN: CPT

## 2024-11-23 PROCEDURE — 83036 HEMOGLOBIN GLYCOSYLATED A1C: CPT

## 2024-11-23 PROCEDURE — 83695 ASSAY OF LIPOPROTEIN(A): CPT

## 2024-11-23 PROCEDURE — 80061 LIPID PANEL: CPT

## 2024-11-24 LAB — APO B SERPL-MCNC: 72 MG/DL

## 2024-11-25 LAB — LPA SERPL-SCNC: <9 NMOL/L

## 2024-11-26 ENCOUNTER — OFFICE VISIT (OUTPATIENT)
Dept: FAMILY MEDICINE CLINIC | Facility: CLINIC | Age: 54
End: 2024-11-26

## 2024-11-26 ENCOUNTER — OFFICE VISIT (OUTPATIENT)
Dept: OBGYN CLINIC | Facility: CLINIC | Age: 54
End: 2024-11-26
Payer: COMMERCIAL

## 2024-11-26 VITALS
OXYGEN SATURATION: 98 % | SYSTOLIC BLOOD PRESSURE: 120 MMHG | HEART RATE: 86 BPM | HEIGHT: 60 IN | RESPIRATION RATE: 16 BRPM | DIASTOLIC BLOOD PRESSURE: 74 MMHG | BODY MASS INDEX: 25.32 KG/M2 | WEIGHT: 129 LBS

## 2024-11-26 VITALS — SYSTOLIC BLOOD PRESSURE: 110 MMHG | DIASTOLIC BLOOD PRESSURE: 62 MMHG | BODY MASS INDEX: 25.19 KG/M2 | WEIGHT: 129 LBS

## 2024-11-26 DIAGNOSIS — N90.89 VULVAR LESION: Primary | ICD-10-CM

## 2024-11-26 DIAGNOSIS — R21 RASH: ICD-10-CM

## 2024-11-26 DIAGNOSIS — B02.9 HERPES ZOSTER WITHOUT COMPLICATION: Primary | ICD-10-CM

## 2024-11-26 DIAGNOSIS — L93.0 DISCOID LUPUS ERYTHEMATOSUS: ICD-10-CM

## 2024-11-26 PROCEDURE — 99214 OFFICE O/P EST MOD 30 MIN: CPT | Performed by: PHYSICIAN ASSISTANT

## 2024-11-26 PROCEDURE — 87529 HSV DNA AMP PROBE: CPT | Performed by: PHYSICIAN ASSISTANT

## 2024-11-26 RX ORDER — FERROUS SULFATE 325(65) MG
325 TABLET, DELAYED RELEASE (ENTERIC COATED) ORAL
COMMUNITY

## 2024-11-26 RX ORDER — VALACYCLOVIR HYDROCHLORIDE 1 G/1
1000 TABLET, FILM COATED ORAL 3 TIMES DAILY
Qty: 21 TABLET | Refills: 0 | Status: SHIPPED | OUTPATIENT
Start: 2024-11-26 | End: 2024-12-03

## 2024-11-26 RX ORDER — GABAPENTIN 100 MG/1
100 CAPSULE ORAL 3 TIMES DAILY PRN
Qty: 30 CAPSULE | Refills: 1 | Status: SHIPPED | OUTPATIENT
Start: 2024-11-26

## 2024-11-26 NOTE — PROGRESS NOTES
"Name: Lisa Padilla      : 1970      MRN: [id number]  Encounter Provider: Shaye Vazquez PA-C  Encounter Date: 2024   Encounter department: [facility]  :  Assessment & Plan  Vulvar lesion  Pt with singular lesion present on labia. Although recurrent, not appearing similar to herpetic lesion.   Will plan viral culture.   This lesion is more similar in appearance to rashes pt has through other body. Multiple other lesions on hands, legs, arms similar with more superficial 1st degree \"burn like\" appearance.     Orders:    HSV TYPE 1,2 DNA PCR [facility] ONLY    Rash    Unsure if rash on her mons is due to SLE flare up.   Pt will plan PCP evaluation today.   Rash looks consistent with herpes zoster, but given location and the fact that it crosses over the midline I am not convinced that it is th ecase.            History of Present Illness     HPI  Lisa Padilla is a 54 y.o. female who presents with rash on her mons.   She does have h/o SLE and when she has a flare up will often have a rashes. She does not feel like this is a similar rash to what she has had previously.   Rash began on Friday began red and flat then blistered and now it is painful.   She notes that there is one spot on her vulva that is bothering her as well. This was present off and on but most recently began before the flare up on her abdomen.   Pt is not sexually active.   Pt unsure if she had chicken pox as a child.         History obtained from: patient    Review of Systems   Constitutional: Negative.    Respiratory: Negative.     Genitourinary: Negative.    Skin:  Positive for rash.        Pt with patches of rash with erythematous base and shallow superficial lesions present on b/l arms/hands, legs and ankles.   Pt notes this rash is what she will experience when her SLE flares up.     Psychiatric/Behavioral: Negative.       Medical History Reviewed by provider this encounter:     .  Past Medical History   Past " Medical History:   Diagnosis Date    Anxiety     Disease of thyroid gland     Erythema multiforme     GERD (gastroesophageal reflux disease)     Hx of basal cell carcinoma 06/22/2023    Hx of oral aphthous ulcers     Lupus     Migraine headache     Osteoarthritis     Thyroid nodule     Ulcerative colitis (HCC)     Undifferentiated connective tissue disease (HCC)      Past Surgical History:   Procedure Laterality Date    EGD AND COLONOSCOPY N/A 10/4/2018    Procedure: EGD AND COLONOSCOPY;  Surgeon: Heidy Bernard MD;  Location: [facility];  Service: Gastroenterology    THYROIDECTOMY Right      Family History   Problem Relation Age of Onset    Breast cancer Mother 70    Coronary artery disease Mother     Cancer Mother     Heart disease Mother     Lymphoma Father 79    Cancer Father         Non-hosgekins lymphoma    Diabetes Maternal Grandmother     Lymphoma Maternal Grandmother     Cancer Maternal Grandmother     No Known Problems Maternal Grandfather     No Known Problems Paternal Grandmother     No Known Problems Paternal Grandfather     No Known Problems Brother     No Known Problems Brother     No Known Problems Brother     No Known Problems Brother     No Known Problems Son     Heart disease Maternal Aunt     No Known Problems Maternal Aunt     No Known Problems Paternal Aunt       reports that she has never smoked. She has never used smokeless tobacco. She reports current alcohol use. She reports that she does not use drugs.  Current Outpatient Medications on File Prior to Visit   Medication Sig Dispense Refill    ALPRAZolam (XANAX) 0.25 mg tablet TAKE 1 TABLET(0.25 MG) BY MOUTH DAILY AT BEDTIME AS NEEDED FOR ANXIETY 25 tablet 0    Cholecalciferol (VITAMIN D3 PO) Take by mouth      clobetasol (TEMOVATE) 0.05 % ointment       Cyanocobalamin (VITAMIN B12 PO) Take by mouth      dexamethasone 0.5 MG/5ML elixir Rince with 1 tspful for 2 mins 2-4 times / day then spit out x 5-7 days 237 mL 0    ferrous sulfate 325  (65 FE) MG EC tablet Take 325 mg by mouth      hydroxychloroquine (PLAQUENIL) 200 mg tablet 300 MG DAILY (1.5 TABLETS PER DAY), 1 TABLET IN AM, HALF TABLET IN PM      Tranexamic Acid 650 MG TABS Take 2 tablets (1,300 mg total) by mouth 3 (three) times a day as needed (Heavy days of menses) 30 tablet 1     No current facility-administered medications on file prior to visit.     Allergies   Allergen Reactions    Doxycycline Rash    Keflex [Cephalexin] Rash      Current Outpatient Medications on File Prior to Visit   Medication Sig Dispense Refill    ALPRAZolam (XANAX) 0.25 mg tablet TAKE 1 TABLET(0.25 MG) BY MOUTH DAILY AT BEDTIME AS NEEDED FOR ANXIETY 25 tablet 0    Cholecalciferol (VITAMIN D3 PO) Take by mouth      clobetasol (TEMOVATE) 0.05 % ointment       Cyanocobalamin (VITAMIN B12 PO) Take by mouth      dexamethasone 0.5 MG/5ML elixir Rince with 1 tspful for 2 mins 2-4 times / day then spit out x 5-7 days 237 mL 0    ferrous sulfate 325 (65 FE) MG EC tablet Take 325 mg by mouth      hydroxychloroquine (PLAQUENIL) 200 mg tablet 300 MG DAILY (1.5 TABLETS PER DAY), 1 TABLET IN AM, HALF TABLET IN PM      Tranexamic Acid 650 MG TABS Take 2 tablets (1,300 mg total) by mouth 3 (three) times a day as needed (Heavy days of menses) 30 tablet 1     No current facility-administered medications on file prior to visit.      Social History     Tobacco Use    Smoking status: Never    Smokeless tobacco: Never   Vaping Use    Vaping status: Never Used   Substance and Sexual Activity    Alcohol use: Yes     Comment: occassional     Drug use: No    Sexual activity: Yes     Partners: Male     Birth control/protection: Condom Male        Objective   /62 (BP Location: Left arm, Patient Position: Sitting, Cuff Size: Standard)   Wt 58.5 kg (129 lb)   LMP 11/11/2024 (Within Days)   BMI 25.19 kg/m²      Physical Exam  Vitals reviewed.   Constitutional:       Appearance: She is normal weight.   HENT:      Head: Normocephalic and  atraumatic.   Cardiovascular:      Rate and Rhythm: Normal rate.   Pulmonary:      Effort: Pulmonary effort is normal.   Genitourinary:     Labia:         Right: No rash, tenderness, lesion or injury.         Left: Lesion present. No rash, tenderness or injury.       Urethra: No prolapse, urethral pain, urethral swelling or urethral lesion.      Vagina: Normal.      Cervix: Normal.      Comments: Pt with solitary rash in fold of left sided labia minora and majora. Rash has more superficial appearance almost like a 1st degree burn. Lesion is not overly painful to touch. Not vesicular. No d/c or inguinal adenopathy.   On mons pubis with rash present along the hairline that crosses the midline. Some lesions more crusted and dried, some still more vesicular and blister like. Pt with erythema surrounding the clustered lesions.   Skin:     General: Skin is warm and dry.   Neurological:      Mental Status: She is alert.   Psychiatric:         Mood and Affect: Mood normal.         Behavior: Behavior normal.         Thought Content: Thought content normal.         Judgment: Judgment normal.

## 2024-11-26 NOTE — PROGRESS NOTES
Name: Lisa Padilla      : 1970      MRN: [id number]  Encounter Provider: Bao Cline MD  Encounter Date: 2024   Encounter department: [facility]  Assessment & Plan  1. Shingles.  The presence of large patches of rash with an erythematous base and blistering suggests shingles. The rash is located in the pubic area and is causing significant discomfort. Acyclovir was prescribed, to be taken three times daily for 7 days. She was advised to cover the rash with an ABD pad to reduce irritation. Gabapentin was also prescribed to manage the pain and burning sensation. She was instructed to monitor the rash for any signs of enlargement or secondary infection and to inform the clinic immediately if such symptoms occur. She was educated on the low risk of transmission to others, especially those who have had chickenpox or the vaccine.           Assessment & Plan  Herpes zoster without complication    Orders:    valACYclovir (VALTREX) 1,000 mg tablet; Take 1 tablet (1,000 mg total) by mouth 3 (three) times a day for 7 days    gabapentin (NEURONTIN) 100 mg capsule; Take 1 capsule (100 mg total) by mouth 3 (three) times a day as needed (burning pain)    Discoid lupus erythematosus              History of Present Illness     History of Present Illness  The patient presents for evaluation of a rash.    She reports a recent flare-up of her lupus, which has been causing her stress. She has noticed spots on her legs and mouth. She does not believe the rash is related to her lupus, as it does not resemble previous lupus-related rashes. She has a spot on her buttock but is unsure if it is related to her lupus, noting she has never had a lupus spot in that area before.    Last Friday, she developed a rash in her pubic area that evolved into a blister. She consulted her gynecologist, who suggested it might be shingles and recommended she see her primary care physician. She describes the spot as hot  and painful, with a brownish appearance and small blisters. She has been careful not to touch the spot and washes her hands frequently due to concerns about the possibility of contagion. The rash initially presented as a red spot, which then developed into a blister. She experiences a burning sensation and pain when wearing jeans. She reports no anal involvement.    Additionally, she had severe headaches last week. She was previously tested for herpes.     Review of Systems   Skin:  Positive for color change and rash.     Objective   /74   Pulse 86   Resp 16   Ht 5' (1.524 m)   Wt 58.5 kg (129 lb)   LMP 11/11/2024 (Within Days)   SpO2 98%   BMI 25.19 kg/m²     Physical Exam    Physical Exam

## 2024-11-27 LAB
HSV1 DNA SPEC QL NAA+PROBE: DETECTED
HSV1 DNA SPEC QL NAA+PROBE: NOT DETECTED

## 2024-11-29 DIAGNOSIS — L03.314 CELLULITIS OF GROIN: Primary | ICD-10-CM

## 2024-11-29 RX ORDER — SULFAMETHOXAZOLE AND TRIMETHOPRIM 800; 160 MG/1; MG/1
1 TABLET ORAL EVERY 12 HOURS SCHEDULED
Qty: 10 TABLET | Refills: 0 | Status: SHIPPED | OUTPATIENT
Start: 2024-11-29 | End: 2024-12-04

## 2024-12-02 ENCOUNTER — RESULTS FOLLOW-UP (OUTPATIENT)
Dept: OBGYN CLINIC | Facility: CLINIC | Age: 54
End: 2024-12-02

## 2024-12-02 PROBLEM — B00.9 HSV-2 INFECTION: Status: ACTIVE | Noted: 2024-12-02

## 2024-12-11 DIAGNOSIS — B00.9 HSV INFECTION: Primary | ICD-10-CM

## 2024-12-11 RX ORDER — VALACYCLOVIR HYDROCHLORIDE 500 MG/1
TABLET, FILM COATED ORAL
Qty: 60 TABLET | Refills: 0 | Status: SHIPPED | OUTPATIENT
Start: 2024-12-11 | End: 2024-12-11

## 2024-12-11 RX ORDER — VALACYCLOVIR HYDROCHLORIDE 500 MG/1
TABLET, FILM COATED ORAL
Qty: 60 TABLET | Refills: 6 | Status: SHIPPED | OUTPATIENT
Start: 2024-12-11 | End: 2025-06-11

## 2024-12-17 ENCOUNTER — TELEPHONE (OUTPATIENT)
Age: 54
End: 2024-12-17

## 2024-12-17 DIAGNOSIS — M35.9 UNDIFFERENTIATED CONNECTIVE TISSUE DISEASE (HCC): Primary | ICD-10-CM

## 2024-12-17 NOTE — TELEPHONE ENCOUNTER
Patient called in asking if Dr. Kendrick would want her to have any lab work done prior to her appointment since it has been a long time since she was last seen. Please advise.

## 2024-12-18 NOTE — TELEPHONE ENCOUNTER
I called and left a voicemail for the patient to call us back regarding the labs Dr. Jacobs wants her to have done prior to her appointment.

## 2024-12-18 NOTE — TELEPHONE ENCOUNTER
Pt returning call, provided above message. No further questions, she states she will try to  [facility] testing on Monday

## 2025-01-02 ENCOUNTER — HOSPITAL ENCOUNTER (EMERGENCY)
Facility: HOSPITAL | Age: 55
Discharge: HOME | End: 2025-01-02
Attending: EMERGENCY MEDICINE
Payer: COMMERCIAL

## 2025-01-02 ENCOUNTER — TELEPHONE (OUTPATIENT)
Dept: OTHER | Facility: OTHER | Age: 55
End: 2025-01-02

## 2025-01-02 ENCOUNTER — APPOINTMENT (EMERGENCY)
Dept: CT IMAGING | Facility: HOSPITAL | Age: 55
End: 2025-01-02
Payer: COMMERCIAL

## 2025-01-02 VITALS
OXYGEN SATURATION: 99 % | SYSTOLIC BLOOD PRESSURE: 125 MMHG | TEMPERATURE: 98 F | DIASTOLIC BLOOD PRESSURE: 60 MMHG | RESPIRATION RATE: 18 BRPM | HEART RATE: 90 BPM

## 2025-01-02 DIAGNOSIS — K52.9 COLITIS: ICD-10-CM

## 2025-01-02 DIAGNOSIS — R10.9 ABDOMINAL PAIN: Primary | ICD-10-CM

## 2025-01-02 DIAGNOSIS — K44.9 HIATAL HERNIA: ICD-10-CM

## 2025-01-02 LAB
ALBUMIN SERPL BCG-MCNC: 4.3 G/DL (ref 3.5–5)
ALP SERPL-CCNC: 79 U/L (ref 34–104)
ALT SERPL W P-5'-P-CCNC: 11 U/L (ref 7–52)
ANION GAP SERPL CALCULATED.3IONS-SCNC: 7 MMOL/L (ref 4–13)
AST SERPL W P-5'-P-CCNC: 20 U/L (ref 13–39)
BACTERIA UR QL AUTO: ABNORMAL /HPF
BASOPHILS # BLD AUTO: 0.08 THOUSANDS/ΜL (ref 0–0.1)
BASOPHILS NFR BLD AUTO: 1 % (ref 0–1)
BILIRUB SERPL-MCNC: 0.41 MG/DL (ref 0.2–1)
BILIRUB UR QL STRIP: NEGATIVE
BUN SERPL-MCNC: 13 MG/DL (ref 5–25)
CALCIUM SERPL-MCNC: 8.9 MG/DL (ref 8.4–10.2)
CHLORIDE SERPL-SCNC: 104 MMOL/L (ref 96–108)
CLARITY UR: CLEAR
CO2 SERPL-SCNC: 27 MMOL/L (ref 21–32)
COLOR UR: ABNORMAL
CREAT SERPL-MCNC: 0.88 MG/DL (ref 0.6–1.3)
EOSINOPHIL # BLD AUTO: 0.06 THOUSAND/ΜL (ref 0–0.61)
EOSINOPHIL NFR BLD AUTO: 1 % (ref 0–6)
ERYTHROCYTE [DISTWIDTH] IN BLOOD BY AUTOMATED COUNT: 12.8 % (ref 11.6–15.1)
EXT PREGNANCY TEST URINE: NEGATIVE
EXT. CONTROL: NORMAL
FLUAV AG UPPER RESP QL IA.RAPID: NEGATIVE
FLUBV AG UPPER RESP QL IA.RAPID: NEGATIVE
GFR SERPL CREATININE-BSD FRML MDRD: 75 ML/MIN/1.73SQ M
GLUCOSE SERPL-MCNC: 129 MG/DL (ref 65–140)
GLUCOSE UR STRIP-MCNC: NEGATIVE MG/DL
HCT VFR BLD AUTO: 38.5 % (ref 34.8–46.1)
HGB BLD-MCNC: 13.2 G/DL (ref 11.5–15.4)
HGB UR QL STRIP.AUTO: NEGATIVE
IMM GRANULOCYTES # BLD AUTO: 0.04 THOUSAND/UL (ref 0–0.2)
IMM GRANULOCYTES NFR BLD AUTO: 0 % (ref 0–2)
KETONES UR STRIP-MCNC: NEGATIVE MG/DL
LEUKOCYTE ESTERASE UR QL STRIP: NEGATIVE
LIPASE SERPL-CCNC: 24 U/L (ref 11–82)
LYMPHOCYTES # BLD AUTO: 1.82 THOUSANDS/ΜL (ref 0.6–4.47)
LYMPHOCYTES NFR BLD AUTO: 17 % (ref 14–44)
MCH RBC QN AUTO: 30.8 PG (ref 26.8–34.3)
MCHC RBC AUTO-ENTMCNC: 34.3 G/DL (ref 31.4–37.4)
MCV RBC AUTO: 90 FL (ref 82–98)
MONOCYTES # BLD AUTO: 0.42 THOUSAND/ΜL (ref 0.17–1.22)
MONOCYTES NFR BLD AUTO: 4 % (ref 4–12)
MUCOUS THREADS UR QL AUTO: ABNORMAL
NEUTROPHILS # BLD AUTO: 8.29 THOUSANDS/ΜL (ref 1.85–7.62)
NEUTS SEG NFR BLD AUTO: 77 % (ref 43–75)
NITRITE UR QL STRIP: NEGATIVE
NON-SQ EPI CELLS URNS QL MICRO: ABNORMAL /HPF
NRBC BLD AUTO-RTO: 0 /100 WBCS
PH UR STRIP.AUTO: 8 [PH]
PLATELET # BLD AUTO: 283 THOUSANDS/UL (ref 149–390)
PMV BLD AUTO: 9.6 FL (ref 8.9–12.7)
POTASSIUM SERPL-SCNC: 4.8 MMOL/L (ref 3.5–5.3)
PROT SERPL-MCNC: 7.2 G/DL (ref 6.4–8.4)
PROT UR STRIP-MCNC: ABNORMAL MG/DL
RBC # BLD AUTO: 4.28 MILLION/UL (ref 3.81–5.12)
RBC #/AREA URNS AUTO: ABNORMAL /HPF
SARS-COV+SARS-COV-2 AG RESP QL IA.RAPID: NEGATIVE
SODIUM SERPL-SCNC: 138 MMOL/L (ref 135–147)
SP GR UR STRIP.AUTO: 1.02 (ref 1–1.03)
UROBILINOGEN UR STRIP-ACNC: <2 MG/DL
WBC # BLD AUTO: 10.71 THOUSAND/UL (ref 4.31–10.16)
WBC #/AREA URNS AUTO: ABNORMAL /HPF

## 2025-01-02 PROCEDURE — 85025 COMPLETE CBC W/AUTO DIFF WBC: CPT | Performed by: EMERGENCY MEDICINE

## 2025-01-02 PROCEDURE — 87811 SARS-COV-2 COVID19 W/OPTIC: CPT | Performed by: PHYSICIAN ASSISTANT

## 2025-01-02 PROCEDURE — 36415 COLL VENOUS BLD VENIPUNCTURE: CPT

## 2025-01-02 PROCEDURE — 81001 URINALYSIS AUTO W/SCOPE: CPT

## 2025-01-02 PROCEDURE — 80053 COMPREHEN METABOLIC PANEL: CPT | Performed by: EMERGENCY MEDICINE

## 2025-01-02 PROCEDURE — 99285 EMERGENCY DEPT VISIT HI MDM: CPT | Performed by: PHYSICIAN ASSISTANT

## 2025-01-02 PROCEDURE — 87804 INFLUENZA ASSAY W/OPTIC: CPT | Performed by: PHYSICIAN ASSISTANT

## 2025-01-02 PROCEDURE — 83690 ASSAY OF LIPASE: CPT | Performed by: EMERGENCY MEDICINE

## 2025-01-02 PROCEDURE — 81025 URINE PREGNANCY TEST: CPT | Performed by: PHYSICIAN ASSISTANT

## 2025-01-02 PROCEDURE — 74177 CT ABD & PELVIS W/CONTRAST: CPT

## 2025-01-02 PROCEDURE — 99284 EMERGENCY DEPT VISIT MOD MDM: CPT

## 2025-01-02 PROCEDURE — 96374 THER/PROPH/DIAG INJ IV PUSH: CPT

## 2025-01-02 RX ORDER — KETOROLAC TROMETHAMINE 30 MG/ML
15 INJECTION, SOLUTION INTRAMUSCULAR; INTRAVENOUS ONCE
Status: COMPLETED | OUTPATIENT
Start: 2025-01-02 | End: 2025-01-02

## 2025-01-02 RX ORDER — CIPROFLOXACIN 500 MG/1
500 TABLET, FILM COATED ORAL 2 TIMES DAILY
Qty: 14 TABLET | Refills: 0 | Status: SHIPPED | OUTPATIENT
Start: 2025-01-02 | End: 2025-01-09

## 2025-01-02 RX ORDER — METRONIDAZOLE 500 MG/1
500 TABLET ORAL EVERY 12 HOURS SCHEDULED
Status: DISCONTINUED | OUTPATIENT
Start: 2025-01-02 | End: 2025-01-02

## 2025-01-02 RX ORDER — OXYCODONE AND ACETAMINOPHEN 5; 325 MG/1; MG/1
1 TABLET ORAL EVERY 4 HOURS PRN
Qty: 12 TABLET | Refills: 0 | Status: SHIPPED | OUTPATIENT
Start: 2025-01-02 | End: 2025-01-14

## 2025-01-02 RX ORDER — CIPROFLOXACIN 500 MG/1
500 TABLET, FILM COATED ORAL ONCE
Status: DISCONTINUED | OUTPATIENT
Start: 2025-01-02 | End: 2025-01-02

## 2025-01-02 RX ORDER — METRONIDAZOLE 500 MG/1
500 TABLET ORAL EVERY 12 HOURS SCHEDULED
Qty: 14 TABLET | Refills: 0 | Status: SHIPPED | OUTPATIENT
Start: 2025-01-02 | End: 2025-01-09

## 2025-01-02 RX ORDER — DOCUSATE SODIUM 100 MG/1
100 CAPSULE, LIQUID FILLED ORAL EVERY 12 HOURS
Qty: 14 CAPSULE | Refills: 0 | Status: SHIPPED | OUTPATIENT
Start: 2025-01-02 | End: 2025-01-16

## 2025-01-02 RX ORDER — OXYCODONE AND ACETAMINOPHEN 5; 325 MG/1; MG/1
1 TABLET ORAL ONCE
Refills: 0 | Status: COMPLETED | OUTPATIENT
Start: 2025-01-02 | End: 2025-01-02

## 2025-01-02 RX ADMIN — OXYCODONE HYDROCHLORIDE AND ACETAMINOPHEN 1 TABLET: 5; 325 TABLET ORAL at 16:22

## 2025-01-02 RX ADMIN — KETOROLAC TROMETHAMINE 15 MG: 30 INJECTION, SOLUTION INTRAMUSCULAR; INTRAVENOUS at 13:26

## 2025-01-02 RX ADMIN — IOHEXOL 100 ML: 350 INJECTION, SOLUTION INTRAVENOUS at 13:53

## 2025-01-02 RX ADMIN — AMOXICILLIN AND CLAVULANATE POTASSIUM 1 TABLET: 875; 125 TABLET, FILM COATED ORAL at 16:22

## 2025-01-02 NOTE — TELEPHONE ENCOUNTER
Patient called saying that she was seen in the ED and was told to call her PCP office to schedule an appointment for Monday. I did try scheduling the patient for Monday with Dr. Cline but there were not available date. Patient is asking if the office can give her a call to schedule the appointment for Monday with Dr. Cline.    oriented to person, place and time , normal sensation , short and long term memory intact

## 2025-01-02 NOTE — ED PROVIDER NOTES
no  Time reflects when diagnosis was documented in both MDM as applicable and the Disposition within this note       Time User Action Codes Description Comment    1/2/2025  3:50 PM Gutzweiler, Julie Add [R10.9] Abdominal pain     1/2/2025  3:50 PM Gutzweiler, Julie Add [K52.9] Colitis     1/2/2025  3:50 PM Gutzweiler, Julie Add [K44.9] Hiatal hernia           ED Disposition       ED Disposition   Discharge    Condition   Stable    Date/Time   u Jan 2, 2025  3:50 PM    Comment   Lisayoli Padilla discharge to home/self care.                   Assessment & Plan       Medical Decision Making  This 54 emergency room with a history of anxiety, hypothyroidism, erythema multiforme, GERD, basal cell carcinoma, oral ulcers, lupus, migraines, osteoarthritis, thyroid nodule, ulcerative colitis, connective tissue disease.  She presents with a sudden onset of lower abdominal pain.  She describes it as sharp in sensation.  She has had 4 episodes of vomiting with it.  She complains of associated nausea.  No fever but complained of chills and became sweaty with the onset of pain.  Denies any coughing, nasal congestion, postnasal drip, sore throat.  She denies any dysuria, frequency, urgency, hematuria.  Her last bowel movement was yesterday.  She denies any chest pain or shortness of breath.  Periods have been irregular as she is starting to go through menopause.  She states her last menstrual period was 1 month ago.    Physical exam this 54-year-old female is alert and oriented x 3.  She is in no acute distress.  Her neck is supple upon palpation.  Her lungs are clear to auscultation.  Her heart is regular rate and rhythm without murmur.  Her abdomen is soft without tenderness and guarding over the suprapubic area.  She has no rebound tenderness.  She has no hepatosplenomegaly.  She moves her upper and lower extremities freely.  She has a normal gait.    Differential diagnosis includes but is not limited to cystitis, pyelonephritis,  kidney stones, colitis, diverticulitis, appendicitis, ruptured ovarian cyst    Laboratory studies were taken and were reviewed.  Patient has CT of the abdomen and pelvis which demonstrated colitis.    Impression  Acute abdominal pain  Colitis  Stool burden    Plan patient was medicated with Augmentin and Flagyl at  here in the emergency room.  She was discharged home with Cipro and Flagyl.  She is to have a recheck exam with her family physician in 4 days.  She is going to have a clear diet for 2 days and advance to a bland diet for 2 days and then to have her rechecked by her family physician for clearance to start eating.  If her symptoms get worse, increased fever greater than 100.4 increased pain unrelieved with the Percocet that were prescribed, she will return to the emergency room for repeat exam.  She was given Colace to take 100 mg twice daily as needed for the next 10 days.  She had a lot of stool burden in her colon and I do not want to get her constipated with her colitis .    Amount and/or Complexity of Data Reviewed  Labs: ordered.     Details: Laboratory studies were taken and were reviewed  Radiology: ordered.     Details: CT of the abdomen and pelvis demonstrates colitis.    Risk  OTC drugs.  Prescription drug management.        ED Course as of 01/02/25 2035   Thu Jan 02, 2025   1554 Results were reviewed with the patient and her .  She is okay with the ciprofloxacin for treatment for her colitis.  She is aware about the weakening of tendons possibly with use.  Can continue on a clear diet for the next 2 days and then advance to a bland diet for the following 2 days and then follow-up with her family physician in 5 days for a recheck.         Medications   ketorolac (TORADOL) injection 15 mg (15 mg Intravenous Given 1/2/25 1326)   iohexol (OMNIPAQUE) 350 MG/ML injection (MULTI-DOSE) 100 mL (100 mL Intravenous Given 1/2/25 1353)   oxyCODONE-acetaminophen (PERCOCET) 5-325 mg per tablet 1 tablet  (1 tablet Oral Given 1/2/25 1622)   amoxicillin-clavulanate (AUGMENTIN) 875-125 mg per tablet 1 tablet (1 tablet Oral Given 1/2/25 1622)       ED Risk Strat Scores                                              History of Present Illness       Chief Complaint   Patient presents with    Abdominal Pain     Sudden onset low abd pain and vomiting since 0930 this am       Past Medical History:   Diagnosis Date    Anxiety     Disease of thyroid gland     Erythema multiforme     GERD (gastroesophageal reflux disease)     Hx of basal cell carcinoma 06/22/2023    Hx of oral aphthous ulcers     Lupus     Migraine headache     Osteoarthritis     Thyroid nodule     Ulcerative colitis (HCC)     Undifferentiated connective tissue disease (HCC)       Past Surgical History:   Procedure Laterality Date    EGD AND COLONOSCOPY N/A 10/4/2018    Procedure: EGD AND COLONOSCOPY;  Surgeon: Heidy Bernard MD;  Location: [facility];  Service: Gastroenterology    THYROIDECTOMY Right       Family History   Problem Relation Age of Onset    Breast cancer Mother 70    Coronary artery disease Mother     Cancer Mother     Heart disease Mother     Lymphoma Father 79    Cancer Father         Non-hosgekins lymphoma    Diabetes Maternal Grandmother     Lymphoma Maternal Grandmother     Cancer Maternal Grandmother     No Known Problems Maternal Grandfather     No Known Problems Paternal Grandmother     No Known Problems Paternal Grandfather     No Known Problems Brother     No Known Problems Brother     No Known Problems Brother     No Known Problems Brother     No Known Problems Son     Heart disease Maternal Aunt     No Known Problems Maternal Aunt     No Known Problems Paternal Aunt       Social History     Tobacco Use    Smoking status: Never    Smokeless tobacco: Never   Vaping Use    Vaping status: Never Used   Substance Use Topics    Alcohol use: Yes     Comment: occassional     Drug use: No      E-Cigarette/Vaping    E-Cigarette Use Never User        E-Cigarette/Vaping Substances    Nicotine No     THC No     CBD No     Flavoring No     Other No     Unknown No       I have reviewed and agree with the history as documented.       History provided by:  Patient  Abdominal Pain  Pain location:  Suprapubic  Pain quality: sharp    Pain radiates to:  Does not radiate  Pain severity:  Moderate  Onset quality:  Sudden  Duration:  4 hours  Timing:  Constant  Progression:  Waxing and waning  Chronicity:  New  Context: diet changes    Context: not awakening from sleep, not eating, not previous surgeries, not recent illness, not recent sexual activity, not recent travel, not sick contacts and not suspicious food intake    Relieved by:  Nothing  Worsened by:  Palpation and movement  Ineffective treatments:  Vomiting  Associated symptoms: anorexia, flatus, nausea and vomiting    Associated symptoms: no belching, no chest pain, no chills, no constipation, no cough, no diarrhea, no dysuria, no fatigue, no fever, no hematuria, no melena, no shortness of breath and no sore throat    Nausea:     Severity:  Moderate    Onset quality:  Gradual    Timing:  Constant    Progression:  Waxing and waning  Risk factors: obesity    Risk factors: no alcohol abuse, no aspirin use, not elderly, has not had multiple surgeries, no NSAID use, not pregnant and no recent hospitalization        Review of Systems   Constitutional:  Positive for activity change and appetite change. Negative for chills, diaphoresis, fatigue and fever.   HENT:  Negative for congestion, ear discharge, ear pain, postnasal drip, rhinorrhea and sore throat.    Eyes:  Negative for pain, discharge, redness and itching.   Respiratory:  Negative for cough and shortness of breath.    Cardiovascular:  Negative for chest pain.   Gastrointestinal:  Positive for abdominal pain, anorexia, flatus, nausea and vomiting. Negative for constipation, diarrhea and melena.   Genitourinary:  Negative for dysuria, frequency, hematuria and  urgency.   Musculoskeletal:  Negative for back pain and gait problem.   Skin:  Negative for color change, pallor and rash.   Psychiatric/Behavioral:  Negative for confusion.    All other systems reviewed and are negative.          Objective       ED Triage Vitals [01/02/25 1143]   Temperature Pulse Blood Pressure Respirations SpO2 Patient Position - Orthostatic VS   98 °F (36.7 °C) 90 125/60 18 99 % Sitting      Temp Source Heart Rate Source BP Location FiO2 (%) Pain Score    Oral Monitor Right arm -- --      Vitals      Date and Time Temp Pulse SpO2 Resp BP Pain Score FACES Pain Rating User   01/02/25 1143 98 °F (36.7 °C) 90 99 % 18 125/60 -- -- TS            Physical Exam  Vitals and nursing note reviewed.   Constitutional:       Appearance: She is well-developed and normal weight.   HENT:      Head: Normocephalic and atraumatic.   Cardiovascular:      Rate and Rhythm: Normal rate and regular rhythm.      Heart sounds: Normal heart sounds.   Pulmonary:      Effort: Pulmonary effort is normal.      Breath sounds: Normal breath sounds.   Abdominal:      General: Abdomen is flat.      Palpations: Abdomen is soft.      Tenderness: There is abdominal tenderness in the suprapubic area. There is guarding. There is no rebound. Negative signs include Rovsing's sign.   Skin:     General: Skin is warm.      Capillary Refill: Capillary refill takes less than 2 seconds.   Neurological:      Mental Status: She is alert and oriented to person, place, and time.   Psychiatric:         Mood and Affect: Mood normal.         Behavior: Behavior normal.         Results Reviewed       Procedure Component Value Units Date/Time    FLU/COVID Rapid Antigen (30 min. TAT) - Preferred screening test in ED [[id number]]  (Normal) Collected: 01/02/25 1329    Lab Status: Final result Specimen: Nares from Nose Updated: 01/02/25 1350     SARS COV Rapid Antigen Negative     Influenza A Rapid Antigen Negative     Influenza B Rapid Antigen Negative     Narrative:      This test has been performed using the [employer] Yessica 2 FLU+SARS Antigen test under the Emergency Use Authorization (EUA). This test has been validated by the  and verified by the performing laboratory. The Yessica uses lateral flow immunofluorescent sandwich assay to detect SARS-COV, Influenza A and Influenza B Antigen.     The Quidel Yessica 2 SARS Antigen test does not differentiate between SARS-CoV and SARS-CoV-2.     Negative results are presumptive and may be confirmed with a molecular assay, if necessary, for patient management. Negative results do not rule out SARS-CoV-2 or influenza infection and should not be used as the sole basis for treatment or patient management decisions. A negative test result may occur if the level of antigen in a sample is below the limit of detection of this test.     Positive results are indicative of the presence of viral antigens, but do not rule out bacterial infection or co-infection with other viruses.     All test results should be used as an adjunct to clinical observations and other information available to the provider.    FOR PEDIATRIC PATIENTS - copy/paste COVID Guidelines URL to browser: [email or web address]    POCT pregnancy, urine [[id number]]  (Normal) Collected: 01/02/25 1341    Lab Status: Final result Updated: 01/02/25 1341     EXT Preg Test, Ur Negative     Control Valid    Urine Microscopic [[id number]]  (Abnormal) Collected: 01/02/25 1239    Lab Status: Final result Specimen: Urine, Clean Catch Updated: 01/02/25 1252     RBC, UA 1-2 /hpf      WBC, UA 1-2 /hpf      Epithelial Cells Occasional /hpf      Bacteria, UA None Seen /hpf      MUCUS THREADS Occasional    UA w Reflex to Microscopic w Reflex to Culture [[id number]]  (Abnormal) Collected: 01/02/25 1239    Lab Status: Final result Specimen: Urine, Clean Catch Updated: 01/02/25 1250     Color, UA Light Yellow     Clarity, UA Clear      Specific Gravity, UA 1.020     pH, UA 8.0     Leukocytes, UA Negative     Nitrite, UA Negative     Protein, UA Trace mg/dl      Glucose, UA Negative mg/dl      Ketones, UA Negative mg/dl      Urobilinogen, UA <2.0 mg/dl      Bilirubin, UA Negative     Occult Blood, UA Negative    Comprehensive metabolic panel [[id number]] Collected: 01/02/25 1148    Lab Status: Final result Specimen: Blood from Arm, Right Updated: 01/02/25 1226     Sodium 138 mmol/L      Potassium 4.8 mmol/L      Chloride 104 mmol/L      CO2 27 mmol/L      ANION GAP 7 mmol/L      BUN 13 mg/dL      Creatinine 0.88 mg/dL      Glucose 129 mg/dL      Calcium 8.9 mg/dL      AST 20 U/L      ALT 11 U/L      Alkaline Phosphatase 79 U/L      Total Protein 7.2 g/dL      Albumin 4.3 g/dL      Total Bilirubin 0.41 mg/dL      eGFR 75 ml/min/1.73sq m     Narrative:      National Kidney Disease Foundation guidelines for Chronic Kidney Disease (CKD):     Stage 1 with normal or high GFR (GFR > 90 mL/min/1.73 square meters)    Stage 2 Mild CKD (GFR = 60-89 mL/min/1.73 square meters)    Stage 3A Moderate CKD (GFR = 45-59 mL/min/1.73 square meters)    Stage 3B Moderate CKD (GFR = 30-44 mL/min/1.73 square meters)    Stage 4 Severe CKD (GFR = 15-29 mL/min/1.73 square meters)    Stage 5 End Stage CKD (GFR <15 mL/min/1.73 square meters)  Note: GFR calculation is accurate only with a steady state creatinine    Lipase [[id number]]  (Normal) Collected: 01/02/25 1148    Lab Status: Final result Specimen: Blood from Arm, Right Updated: 01/02/25 1226     Lipase 24 u/L     CBC and differential [[id number]]  (Abnormal) Collected: 01/02/25 1148    Lab Status: Final result Specimen: Blood from Arm, Right Updated: 01/02/25 1208     WBC 10.71 Thousand/uL      RBC 4.28 Million/uL      Hemoglobin 13.2 g/dL      Hematocrit 38.5 %      MCV 90 fL      MCH 30.8 pg      MCHC 34.3 g/dL      RDW 12.8 %      MPV 9.6 fL      Platelets 283 Thousands/uL      nRBC 0 /100 WBCs      Segmented % 77 %       Immature Grans % 0 %      Lymphocytes % 17 %      Monocytes % 4 %      Eosinophils Relative 1 %      Basophils Relative 1 %      Absolute Neutrophils 8.29 Thousands/µL      Absolute Immature Grans 0.04 Thousand/uL      Absolute Lymphocytes 1.82 Thousands/µL      Absolute Monocytes 0.42 Thousand/µL      Eosinophils Absolute 0.06 Thousand/µL      Basophils Absolute 0.08 Thousands/µL             CT abdomen pelvis with contrast   ED Interpretation by Julie Lynn Gutzweiler, PA-C (01/02 9507)   CT abdomen pelvis with contrast  Status: Final result    PACS Images     Show images for CT abdomen pelvis with contrast  Study Result    Narrative & Impression  CT ABDOMEN AND PELVIS WITH IV CONTRAST     INDICATION: lower abd pain and guarding, r/o appy vs ovarian cyst. .  COMPARISON: None.     TECHNIQUE: CT examination of the abdomen and pelvis was performed. Multiplanar 2D reformatted images were created from the source data.     This examination, like all CT scans performed in the [facility], was performed utilizing techniques to minimize radiation dose exposure, including the use of iterative reconstruction and automated exposure control. Radiation dose length   product (DLP) for this visit: 428 mGy-cm     IV Contrast: 100 mL of iohexol (OMNIPAQUE)  Enteric Contrast: Not administered.     FINDINGS:     ABDOMEN     LOWER CHEST: Small hiatal hernia. No other clinically significant abnormality in the visualized lower chest.     LIVER/BILIARY TREE: Unremarkable.        GALLBLADDER: No calcified gallstones. No pericholecystic inflammatory change.     SPLEEN: Unremarkable.     PANCREAS: Unremarkable.     ADRENAL GLANDS: Unremarkable.     KIDNEYS/URETERS: Unremarkable. No hydronephrosis.     STOMACH AND BOWEL: There is a short segment of thickening of the sigmoid colon measuring approximately 5 cm in length. There is very mild haziness in the adjacent mesentery.     APPENDIX: Normal.     ABDOMINOPELVIC CAVITY: No  ascites. No pneumoperitoneum. No lymphadenopathy.     VESSELS: Unremarkable for patient's age.     PELVIS     REPRODUCTIVE ORGANS: There are follicles in the left ovary measuring up to 2 cm. The uterus is heterogeneously enhancing which may be secondary to adenomyosis. There is a small subserosal fibroid anteriorly.     URINARY BLADDER: Unremarkable.     ABDOMINAL WALL/INGUINAL REGIONS: Unremarkable.     BONES: No acute fracture or suspicious osseous lesion.     IMPRESSION:     1.  Short segment of thickening involving the sigmoid colon with s   light stranding in the adjacent mesentery. This is suggestive of an acute inflammatory or infectious colitis though the short segment of involvement is atypical. There are no diverticula   to suggest acute diverticulitis. A follow-up colonoscopy is recommended to exclude a focal underlying lesion.     2.  Possible adenomyosis and fibroid.     3.  Small hiatal hernia.     The study was marked in EPIC for immediate notification.              Workstation performed: [id number]             Final Interpretation by Clarke Martinez MD (01/02 1510)      1.  Short segment of thickening involving the sigmoid colon with slight stranding in the adjacent mesentery. This is suggestive of an acute inflammatory or infectious colitis though the short segment of involvement is atypical. There are no diverticula    to suggest acute diverticulitis. A follow-up colonoscopy is recommended to exclude a focal underlying lesion.      2.  Possible adenomyosis and fibroid.      3.  Small hiatal hernia.      The study was marked in EPIC for immediate notification.               Workstation performed: [id number]             Procedures    ED Medication and Procedure Management   Prior to Admission Medications   Prescriptions Last Dose Informant Patient Reported? Taking?   ALPRAZolam (XANAX) 0.25 mg tablet   No No   Sig: TAKE 1 TABLET(0.25 MG) BY MOUTH DAILY AT BEDTIME AS NEEDED FOR ANXIETY    Cholecalciferol (VITAMIN D3 PO)  Self Yes No   Sig: Take by mouth   Cyanocobalamin (VITAMIN B12 PO)  Self Yes No   Sig: Take by mouth   Tranexamic Acid 650 MG TABS   No No   Sig: Take 2 tablets (1,300 mg total) by mouth 3 (three) times a day as needed (Heavy days of menses)   clobetasol (TEMOVATE) 0.05 % ointment   Yes No   dexamethasone 0.5 MG/5ML elixir   No No   Sig: Rince with 1 tspful for 2 mins 2-4 times / day then spit out x 5-7 days   ferrous sulfate 325 (65 FE) MG EC tablet   Yes No   Sig: Take 325 mg by mouth   gabapentin (NEURONTIN) 100 mg capsule   No No   Sig: Take 1 capsule (100 mg total) by mouth 3 (three) times a day as needed (burning pain)   hydroxychloroquine (PLAQUENIL) 200 mg tablet  Self Yes No   Si MG DAILY (1.5 TABLETS PER DAY), 1 TABLET IN AM, HALF TABLET IN PM   valACYclovir (VALTREX) 500 mg tablet   No No   Sig: Use one tablet twice daily with outbreaks. Start at sx onset.      Facility-Administered Medications: None     Discharge Medication List as of 2025  3:54 PM        START taking these medications    Details   ciprofloxacin (CIPRO) 500 mg tablet Take 1 tablet (500 mg total) by mouth 2 (two) times a day for 7 days, Starting 2025, Until 2025, Normal      metroNIDAZOLE (FLAGYL) 500 mg tablet Take 1 tablet (500 mg total) by mouth every 12 (twelve) hours for 7 days, Starting 2025, Until 2025, Normal      oxyCODONE-acetaminophen (Percocet) 5-325 mg per tablet Take 1 tablet by mouth every 4 (four) hours as needed for moderate pain for up to 12 days Max Daily Amount: 12 tablets, Starting 2025, Until 2025 at 2359, Normal           CONTINUE these medications which have NOT CHANGED    Details   ALPRAZolam (XANAX) 0.25 mg tablet TAKE 1 TABLET(0.25 MG) BY MOUTH DAILY AT BEDTIME AS NEEDED FOR ANXIETY, Normal      Cholecalciferol (VITAMIN D3 PO) Take by mouth, Historical Med      clobetasol (TEMOVATE) 0.05 % ointment Historical Med       Cyanocobalamin (VITAMIN B12 PO) Take by mouth, Historical Med      dexamethasone 0.5 MG/5ML elixir Rince with 1 tspful for 2 mins 2-4 times / day then spit out x 5-7 days, Normal      ferrous sulfate 325 (65 FE) MG EC tablet Take 325 mg by mouth, Historical Med      gabapentin (NEURONTIN) 100 mg capsule Take 1 capsule (100 mg total) by mouth 3 (three) times a day as needed (burning pain), Starting Tue 11/26/2024, Normal      hydroxychloroquine (PLAQUENIL) 200 mg tablet 300 MG DAILY (1.5 TABLETS PER DAY), 1 TABLET IN AM, HALF TABLET IN PM, Historical Med      Tranexamic Acid 650 MG TABS Take 2 tablets (1,300 mg total) by mouth 3 (three) times a day as needed (Heavy days of menses), Starting Thu 4/4/2024, Normal      valACYclovir (VALTREX) 500 mg tablet Use one tablet twice daily with outbreaks. Start at sx onset., Normal           No discharge procedures on file.  ED SEPSIS DOCUMENTATION   Time reflects when diagnosis was documented in both MDM as applicable and the Disposition within this note       Time User Action Codes Description Comment    1/2/2025  3:50 PM Gutzweiler, Julie Add [R10.9] Abdominal pain     1/2/2025  3:50 PM Gutzweiler, Julie Add [K52.9] Colitis     1/2/2025  3:50 PM Gutzweiler, Julie Add [K44.9] Hiatal hernia                  Julie Lynn Gutzweiler, PA-C  01/02/25 2035

## 2025-01-07 ENCOUNTER — OFFICE VISIT (OUTPATIENT)
Dept: FAMILY MEDICINE CLINIC | Facility: CLINIC | Age: 55
End: 2025-01-07
Payer: COMMERCIAL

## 2025-01-07 VITALS
BODY MASS INDEX: 25.13 KG/M2 | SYSTOLIC BLOOD PRESSURE: 112 MMHG | HEART RATE: 83 BPM | WEIGHT: 128 LBS | OXYGEN SATURATION: 98 % | HEIGHT: 60 IN | DIASTOLIC BLOOD PRESSURE: 70 MMHG

## 2025-01-07 DIAGNOSIS — K52.9 COLITIS: Primary | ICD-10-CM

## 2025-01-07 DIAGNOSIS — L93.0 DISCOID LUPUS ERYTHEMATOSUS: ICD-10-CM

## 2025-01-07 DIAGNOSIS — Z87.19 HX OF ORAL APHTHOUS ULCERS: ICD-10-CM

## 2025-01-07 PROCEDURE — 99214 OFFICE O/P EST MOD 30 MIN: CPT | Performed by: FAMILY MEDICINE

## 2025-01-07 NOTE — PROGRESS NOTES
Name: Lisa Padilla      : 1970      MRN: [id number]  Encounter Provider: Bao Cline MD  Encounter Date: 2025   Encounter department: [facility]    Assessment & Plan  Colitis    Orders:    Ambulatory Referral to Gastroenterology; Future    Discoid lupus erythematosus    Orders:    Ambulatory Referral to Gastroenterology; Future    Hx of oral aphthous ulcers    Orders:    Ambulatory Referral to Gastroenterology; Future      Assessment & Plan  1. Colitis.  She was prescribed Cipro and Flagyl for a 7-day course. She has been following a clear liquid diet and bland foods as recommended by the ER. She is advised to continue her current dietary regimen until Thursday, after which she can gradually introduce more solid foods such as toast, oatmeal, rice, dry Cheerios, frosted flakes, and yogurt. The use of probiotics is recommended to restore healthy gut bacteria. She is also advised to try scrambled eggs. A referral to Dr. Self for a potential colonoscopy or endoscopy has been made.    2. Chronic inflammation.  She reports ongoing symptoms of chronic inflammation, which may be related to long COVID-19. She is advised to take Pepcid 20 mg twice daily and Allegra 180 mg twice daily to help manage these symptoms. If symptoms persist, further treatment options will be considered.    PROCEDURE  The patient has undergone two colonoscopies in the past due to gastrointestinal issues.       History of Present Illness     History of Present Illness  The patient presents for an ER follow-up.    She was prescribed Cipro for colitis and advised to adhere to a clear liquid diet for two days, followed by the introduction of bland foods such as bananas and peppers. She has been compliant with this dietary regimen, but her food intake remains minimal. She experienced a recurrence of pain upon consuming toast on Saturday. Her diet over the past two days has consisted of peanut butter crackers, soup,  Jell-O, applesauce, and bananas. She has not yet incorporated salads into her diet. Despite starting a stool softener on Sunday, taken every 12 hours, she has not had a bowel movement. She is currently on a 7-day course of Cipro and Flagyl, which she reports as having an unpleasant taste. She consumed a slice of toast with peanut butter this morning without any adverse effects. She reports feeling nauseous from the antibiotics and is attempting to consume more solid foods. She describes her pain as severe, to the point of inducing vomiting. She has been experiencing early satiety for the past two months, a symptom that was also present in June. She typically experiences constipation. She has undergone two colonoscopies in her youth due to gastrointestinal issues. She has been advised to undergo another colonoscopy. She reports feeling inflamed throughout her body and has been dealing with various health issues. She has not been taking her pain medication but did take Advil when her pain was severe. She expresses concern about the potential constipating effects of pain medication.    She has a history of lupus, which she believes was triggered during the COVID-19 pandemic. She reports that her health was good prior to the pandemic. She has been experiencing mouth and skin ulcers for the past few months. She is willing to try any treatment that may alleviate her symptoms. She has been unable to attend the gym or engage in other activities due to her health issues. She plans to resume visits with her rheumatologist. She is currently taking Plaquenil 200 mg in the morning and 100 mg at night.    MEDICATIONS  - Cipro  - Metronidazole  - Plaquenil  - Advil     Review of Systems   Constitutional:  Negative for fever and unexpected weight change.   HENT:  Negative for nosebleeds and trouble swallowing.    Eyes:  Negative for visual disturbance.   Respiratory:  Negative for chest tightness and shortness of breath.     Cardiovascular:  Negative for chest pain, palpitations and leg swelling.   Gastrointestinal:  Positive for abdominal pain. Negative for constipation, diarrhea and nausea.   Endocrine: Negative for cold intolerance.   Genitourinary:  Negative for dysuria and urgency.   Musculoskeletal:  Negative for joint swelling and myalgias.   Skin:  Negative for rash.   Neurological:  Negative for tremors, seizures and syncope.   Hematological:  Does not bruise/bleed easily.   Psychiatric/Behavioral:  Negative for hallucinations and suicidal ideas.      Objective   /70   Pulse 83   Ht 5' (1.524 m)   Wt 58.1 kg (128 lb)   SpO2 98%   BMI 25.00 kg/m²     Physical Exam    Physical Exam  Vitals and nursing note reviewed.   Constitutional:       Appearance: She is well-developed.   HENT:      Head: Normocephalic and atraumatic.      Right Ear: External ear normal.      Left Ear: External ear normal.      Nose: Nose normal.   Eyes:      Conjunctiva/sclera: Conjunctivae normal.      Pupils: Pupils are equal, round, and reactive to light.   Cardiovascular:      Rate and Rhythm: Normal rate and regular rhythm.      Heart sounds: Normal heart sounds. No murmur heard.  Pulmonary:      Effort: Pulmonary effort is normal.      Breath sounds: Normal breath sounds. No wheezing.   Abdominal:      General: Bowel sounds are normal.      Palpations: Abdomen is soft.      Tenderness: There is abdominal tenderness (llq).   Musculoskeletal:         General: No tenderness. Normal range of motion.      Cervical back: Normal range of motion and neck supple.   Lymphadenopathy:      Cervical: No cervical adenopathy.   Skin:     General: Skin is warm and dry.      Capillary Refill: Capillary refill takes less than 2 seconds.   Neurological:      Mental Status: She is alert and oriented to person, place, and time.   Psychiatric:         Behavior: Behavior normal.         Thought Content: Thought content normal.         Judgment: Judgment normal.

## 2025-01-14 ENCOUNTER — OFFICE VISIT (OUTPATIENT)
Dept: GASTROENTEROLOGY | Facility: AMBULARY SURGERY CENTER | Age: 55
End: 2025-01-14
Payer: COMMERCIAL

## 2025-01-14 VITALS
DIASTOLIC BLOOD PRESSURE: 80 MMHG | SYSTOLIC BLOOD PRESSURE: 122 MMHG | HEIGHT: 60 IN | OXYGEN SATURATION: 99 % | BODY MASS INDEX: 24.74 KG/M2 | WEIGHT: 126 LBS | HEART RATE: 83 BPM

## 2025-01-14 DIAGNOSIS — R19.4 CHANGE IN BOWEL HABITS: ICD-10-CM

## 2025-01-14 DIAGNOSIS — K21.9 GASTROESOPHAGEAL REFLUX DISEASE, UNSPECIFIED WHETHER ESOPHAGITIS PRESENT: Primary | ICD-10-CM

## 2025-01-14 DIAGNOSIS — K52.9 COLITIS: ICD-10-CM

## 2025-01-14 DIAGNOSIS — Z87.19 HX OF ORAL APHTHOUS ULCERS: ICD-10-CM

## 2025-01-14 DIAGNOSIS — R68.81 EARLY SATIETY: ICD-10-CM

## 2025-01-14 DIAGNOSIS — L93.0 DISCOID LUPUS ERYTHEMATOSUS: ICD-10-CM

## 2025-01-14 PROCEDURE — 99244 OFF/OP CNSLTJ NEW/EST MOD 40: CPT | Performed by: INTERNAL MEDICINE

## 2025-01-14 RX ORDER — SODIUM CHLORIDE, SODIUM LACTATE, POTASSIUM CHLORIDE, CALCIUM CHLORIDE 600; 310; 30; 20 MG/100ML; MG/100ML; MG/100ML; MG/100ML
125 INJECTION, SOLUTION INTRAVENOUS CONTINUOUS
OUTPATIENT
Start: 2025-01-14

## 2025-01-14 RX ORDER — FAMOTIDINE 40 MG/1
40 TABLET, FILM COATED ORAL DAILY
Qty: 90 TABLET | Refills: 0 | Status: SHIPPED | OUTPATIENT
Start: 2025-01-14 | End: 2025-04-14

## 2025-01-14 NOTE — PROGRESS NOTES
Name: Lisa Padilla      : 1970      MRN: [id number]  Encounter Provider: Kyle Self MD  Encounter Date: 2025   Encounter department: [facility]  :  Assessment & Plan  Discoid lupus erythematosus  -Follow-up with PCP.  Orders:    Ambulatory Referral to Gastroenterology    Colitis  -Noted to have thickening in the sigmoid colon, reports feeling constipated prior to developing lower abdominal pain which prompted her to go to the emergency room.  She is currently on a stool softener with improved bowel movements.  -Would recommend low residue diet for few more days following with slowly introducing fiber.  -Would recommend colonoscopy in about 4 weeks to rule out any colorectal polyps or lesions and to assess for inflammatory bowel disease.  Orders:    Ambulatory Referral to Gastroenterology    Hx of oral aphthous ulcers  -Follow-up with PCP.  Orders:    Ambulatory Referral to Gastroenterology    Gastroesophageal reflux disease, unspecified whether esophagitis present  -Patient reports early satiety/GERD and intermittent dysphagia symptoms therefore would recommend EGD for further evaluation.  -Will recommend starting on famotidine 40 mg to be taken once daily.  -Follow GERD diet.  -Avoid NSAIDs.    Orders:    EGD; Future    famotidine (PEPCID) 40 MG tablet; Take 1 tablet (40 mg total) by mouth daily    Change in bowel habits  -Recommend colonoscopy to rule out any colorectal polyps/lesions/IBD.  -Recommend GoLytely/Dulcolax bowel prep.  -I obtained informed consent from the patient. The risks/benefits/alternatives of the procedure were discussed with the patient. Risks included, but not limited to, infection, bleeding, perforation, injury to organs in the abdomen, missed lesion and incomplete procedure were discussed. Patient was agreeable and electronic consent was signed.    Orders:    polyethylene glycol (GOLYTELY) 4000 mL solution; Take 4,000 mL by mouth once  for 1 dose    Early satiety  - see above.            History of Present Illness   HPI  Lisa Padilla is a 54 y.o. female with history of anxiety, hypothyroidism, GERD, basal cell carcinoma, osteoarthritis, erythema multiforme was recently in the emergency room with lower abdominal pain, nausea and vomiting.    Labs from the ER reviewed, CMP normal, lipase normal, WBC mildly elevated at 10.7.  Platelets and hemoglobin normal.    CT of abdomen and pelvis performed in the ER was personally reviewed.  This was notable for thickening in the sigmoid colon and adjacent mesentery suggestive of segmental colitis.    She was prescribed ciprofloxacin and Flagyl for 7 days.  Patient reports that she has completed the course.  Patient reports that symptoms have improved significantly however she still has some mild discomfort in the lower abdomen, she has been on brat diet and has been following this closely.  Patient also reports that over the past 6 months or so she has been having Early satiety, reports that she has been feeling full almost immediately after eating.  She also reports occasional symptoms of dysphagia however thought that this was due to vocal cord dysfunction.  She denies any significant NSAID use, reports that the stools are sometimes darker but denies any melena.  She does report taking iron supplements 3 times a week for anemia.    EGD in 2018 notable for small hiatal hernia and gastritis.  She is negative for H. pylori.  Duodenal biopsies negative for celiac disease.  Colonoscopy notable for internal hemorrhoids.  Random biopsies negative for microscopic colitis.        Review of Systems   Constitutional: Negative.    HENT: Negative.     Eyes: Negative.    Respiratory: Negative.     Cardiovascular: Negative.    Gastrointestinal:         See HPI.   Endocrine: Negative.    Genitourinary: Negative.    Musculoskeletal: Negative.    Skin: Negative.    Allergic/Immunologic: Negative.    Neurological: Negative.     Hematological: Negative.    Psychiatric/Behavioral: Negative.     All other systems reviewed and are negative.         Objective   /80 (BP Location: Left arm, Patient Position: Sitting, Cuff Size: Standard)   Pulse 83   Ht 5' (1.524 m)   Wt 57.2 kg (126 lb)   SpO2 99%   BMI 24.61 kg/m²      Physical Exam  Vitals and nursing note reviewed.   Constitutional:       General: She is not in acute distress.     Appearance: She is well-developed.   HENT:      Head: Normocephalic and atraumatic.   Eyes:      General: No scleral icterus.     Conjunctiva/sclera: Conjunctivae normal.   Cardiovascular:      Rate and Rhythm: Normal rate and regular rhythm.      Heart sounds: No murmur heard.  Pulmonary:      Effort: Pulmonary effort is normal. No respiratory distress.      Breath sounds: Normal breath sounds.   Abdominal:      Palpations: Abdomen is soft.      Tenderness: There is no abdominal tenderness.   Musculoskeletal:         General: No swelling.      Cervical back: Neck supple.   Skin:     General: Skin is warm and dry.   Neurological:      Mental Status: She is alert.   Psychiatric:         Mood and Affect: Mood normal.

## 2025-01-29 DIAGNOSIS — K12.0 APHTHOUS ULCER: ICD-10-CM

## 2025-01-29 RX ORDER — DEXAMETHASONE 0.5 MG/5ML
ELIXIR ORAL
Qty: 237 ML | Refills: 0 | Status: SHIPPED | OUTPATIENT
Start: 2025-01-29

## 2025-01-29 NOTE — TELEPHONE ENCOUNTER
Reason for call:   [x] Refill   [] Prior Auth  [] Other:     Office:   [x] PCP/Provider - Bao Cline  [] Specialty/Provider -     Medication: Dexamethasone elixir     Dose/Frequency: 0.5 mg /5 ml 1 tspful for 2 minutes 2-4 times a day     Quantity: 237 ml     [facility]    Does the patient have enough for 3 days?   [x] Yes   [] No - Send as HP to POD

## 2025-02-07 ENCOUNTER — ANESTHESIA EVENT (OUTPATIENT)
Dept: ANESTHESIOLOGY | Facility: HOSPITAL | Age: 55
End: 2025-02-07

## 2025-02-07 ENCOUNTER — ANESTHESIA (OUTPATIENT)
Dept: ANESTHESIOLOGY | Facility: HOSPITAL | Age: 55
End: 2025-02-07

## 2025-02-12 ENCOUNTER — OFFICE VISIT (OUTPATIENT)
Dept: FAMILY MEDICINE CLINIC | Facility: CLINIC | Age: 55
End: 2025-02-12
Payer: COMMERCIAL

## 2025-02-12 VITALS
BODY MASS INDEX: 24.94 KG/M2 | OXYGEN SATURATION: 99 % | HEART RATE: 66 BPM | RESPIRATION RATE: 16 BRPM | HEIGHT: 60 IN | SYSTOLIC BLOOD PRESSURE: 110 MMHG | DIASTOLIC BLOOD PRESSURE: 70 MMHG | WEIGHT: 127 LBS

## 2025-02-12 DIAGNOSIS — H10.31 ACUTE BACTERIAL CONJUNCTIVITIS OF RIGHT EYE: Primary | ICD-10-CM

## 2025-02-12 DIAGNOSIS — L93.0 DISCOID LUPUS ERYTHEMATOSUS: ICD-10-CM

## 2025-02-12 DIAGNOSIS — L03.213 PERIORBITAL CELLULITIS OF RIGHT EYE: ICD-10-CM

## 2025-02-12 PROCEDURE — 99214 OFFICE O/P EST MOD 30 MIN: CPT | Performed by: FAMILY MEDICINE

## 2025-02-12 RX ORDER — GENTAMICIN SULFATE 3 MG/ML
1 SOLUTION/ DROPS OPHTHALMIC 4 TIMES DAILY
Qty: 5 ML | Refills: 0 | Status: SHIPPED | OUTPATIENT
Start: 2025-02-12 | End: 2025-02-19

## 2025-02-12 RX ORDER — SULFAMETHOXAZOLE AND TRIMETHOPRIM 800; 160 MG/1; MG/1
1 TABLET ORAL EVERY 12 HOURS SCHEDULED
Qty: 14 TABLET | Refills: 0 | Status: SHIPPED | OUTPATIENT
Start: 2025-02-12 | End: 2025-02-19

## 2025-02-12 NOTE — ASSESSMENT & PLAN NOTE
To use drops in both eyes and continue washing her hands and eyes regularly  Orders:  •  gentamicin (GARAMYCIN) 0.3 % ophthalmic solution; Administer 1 drop to both eyes 4 (four) times a day for 7 days

## 2025-02-12 NOTE — PROGRESS NOTES
Name: Lisa Padilla      : 1970      MRN: [id number]  Encounter Provider: Debora Parker MD  Encounter Date: 2025   Encounter department: [facility]  :  Assessment & Plan  Acute bacterial conjunctivitis of right eye  To use drops in both eyes and continue washing her hands and eyes regularly  Orders:  •  gentamicin (GARAMYCIN) 0.3 % ophthalmic solution; Administer 1 drop to both eyes 4 (four) times a day for 7 days    Periorbital cellulitis of right eye  Right eye conjunctivitis and swelling and redness under the right eye, will start her on Bactrim  Orders:  •  sulfamethoxazole-trimethoprim (BACTRIM DS) 800-160 mg per tablet; Take 1 tablet by mouth every 12 (twelve) hours for 7 days    Discoid lupus erythematosus  On Plaquenil follows ophthalmologist regular basis              History of Present Illness   Conjunctivitis   The current episode started 2 days ago. The onset was sudden. The problem occurs continuously. The problem has been gradually worsening. The problem is mild. Nothing relieves the symptoms. Associated symptoms include eye discharge, eye pain and eye redness. Pertinent negatives include no fever, no double vision, no photophobia, no diarrhea, no nausea, no vomiting, no congestion, no ear discharge, no ear pain, no headaches, no hearing loss, no mouth sores, no rhinorrhea, no sore throat, no swollen glands, no muscle aches, no cough, no URI and no wheezing. The eye pain is mild.   He also noted swelling under the right eye and there is small area which is swollen and looks red and mild discomfort on the right side of the face  Review of Systems   Constitutional:  Negative for fever.   HENT:  Negative for congestion, ear discharge, ear pain, hearing loss, mouth sores, rhinorrhea and sore throat.    Eyes:  Positive for pain, discharge and redness. Negative for double vision and photophobia.   Respiratory:  Negative for cough and wheezing.    Gastrointestinal:  Negative  for diarrhea, nausea and vomiting.   Musculoskeletal: Negative.    Neurological:  Negative for headaches.       Objective   /70   Pulse 66   Resp 16   Ht 5' (1.524 m)   Wt 57.6 kg (127 lb)   SpO2 99%   BMI 24.80 kg/m²      Physical Exam  Vitals reviewed.   HENT:      Nose: No congestion.   Eyes:      General:         Right eye: Discharge present.   Cardiovascular:      Rate and Rhythm: Normal rate.      Heart sounds: No murmur heard.  Pulmonary:      Effort: Pulmonary effort is normal. No respiratory distress.      Breath sounds: No wheezing.   Musculoskeletal:      Cervical back: Normal range of motion.   Skin:     Comments: Red and swollen area under rt eye   Neurological:      Mental Status: She is alert.

## 2025-02-12 NOTE — ASSESSMENT & PLAN NOTE
Right eye conjunctivitis and swelling and redness under the right eye, will start her on Bactrim  Orders:  •  sulfamethoxazole-trimethoprim (BACTRIM DS) 800-160 mg per tablet; Take 1 tablet by mouth every 12 (twelve) hours for 7 days

## 2025-02-21 ENCOUNTER — ANESTHESIA EVENT (OUTPATIENT)
Dept: GASTROENTEROLOGY | Facility: AMBULARY SURGERY CENTER | Age: 55
End: 2025-02-21
Payer: COMMERCIAL

## 2025-02-21 ENCOUNTER — HOSPITAL ENCOUNTER (OUTPATIENT)
Dept: GASTROENTEROLOGY | Facility: AMBULARY SURGERY CENTER | Age: 55
Setting detail: OUTPATIENT SURGERY
End: 2025-02-21
Attending: INTERNAL MEDICINE
Payer: COMMERCIAL

## 2025-02-21 VITALS
OXYGEN SATURATION: 100 % | DIASTOLIC BLOOD PRESSURE: 56 MMHG | SYSTOLIC BLOOD PRESSURE: 105 MMHG | HEART RATE: 72 BPM | HEIGHT: 60 IN | WEIGHT: 125.4 LBS | TEMPERATURE: 98.1 F | BODY MASS INDEX: 24.62 KG/M2 | RESPIRATION RATE: 21 BRPM

## 2025-02-21 DIAGNOSIS — K52.9 COLITIS: ICD-10-CM

## 2025-02-21 DIAGNOSIS — K21.9 GASTROESOPHAGEAL REFLUX DISEASE, UNSPECIFIED WHETHER ESOPHAGITIS PRESENT: ICD-10-CM

## 2025-02-21 LAB
EXT PREGNANCY TEST URINE: NEGATIVE
EXT. CONTROL: NORMAL

## 2025-02-21 PROCEDURE — 88341 IMHCHEM/IMCYTCHM EA ADD ANTB: CPT | Performed by: PATHOLOGY

## 2025-02-21 PROCEDURE — 81025 URINE PREGNANCY TEST: CPT | Performed by: INTERNAL MEDICINE

## 2025-02-21 PROCEDURE — 45380 COLONOSCOPY AND BIOPSY: CPT | Performed by: INTERNAL MEDICINE

## 2025-02-21 PROCEDURE — 88313 SPECIAL STAINS GROUP 2: CPT | Performed by: PATHOLOGY

## 2025-02-21 PROCEDURE — 43239 EGD BIOPSY SINGLE/MULTIPLE: CPT | Performed by: INTERNAL MEDICINE

## 2025-02-21 PROCEDURE — 88305 TISSUE EXAM BY PATHOLOGIST: CPT | Performed by: PATHOLOGY

## 2025-02-21 PROCEDURE — 88342 IMHCHEM/IMCYTCHM 1ST ANTB: CPT | Performed by: PATHOLOGY

## 2025-02-21 RX ORDER — OMEPRAZOLE 40 MG/1
40 CAPSULE, DELAYED RELEASE ORAL
Qty: 180 CAPSULE | Refills: 1 | Status: SHIPPED | OUTPATIENT
Start: 2025-02-21 | End: 2025-05-22

## 2025-02-21 RX ORDER — SODIUM CHLORIDE, SODIUM LACTATE, POTASSIUM CHLORIDE, CALCIUM CHLORIDE 600; 310; 30; 20 MG/100ML; MG/100ML; MG/100ML; MG/100ML
INJECTION, SOLUTION INTRAVENOUS CONTINUOUS PRN
Status: DISCONTINUED | OUTPATIENT
Start: 2025-02-21 | End: 2025-02-21

## 2025-02-21 RX ORDER — PROPOFOL 10 MG/ML
INJECTION, EMULSION INTRAVENOUS AS NEEDED
Status: DISCONTINUED | OUTPATIENT
Start: 2025-02-21 | End: 2025-02-21

## 2025-02-21 RX ORDER — LIDOCAINE HYDROCHLORIDE 10 MG/ML
INJECTION, SOLUTION EPIDURAL; INFILTRATION; INTRACAUDAL; PERINEURAL AS NEEDED
Status: DISCONTINUED | OUTPATIENT
Start: 2025-02-21 | End: 2025-02-21

## 2025-02-21 RX ADMIN — PROPOFOL 70 MCG/KG/MIN: 10 INJECTION, EMULSION INTRAVENOUS at 08:46

## 2025-02-21 RX ADMIN — LIDOCAINE HYDROCHLORIDE 50 MG: 10 INJECTION, SOLUTION EPIDURAL; INFILTRATION; INTRACAUDAL at 08:45

## 2025-02-21 RX ADMIN — SODIUM CHLORIDE, SODIUM LACTATE, POTASSIUM CHLORIDE, AND CALCIUM CHLORIDE: .6; .31; .03; .02 INJECTION, SOLUTION INTRAVENOUS at 08:41

## 2025-02-21 RX ADMIN — SODIUM CHLORIDE, SODIUM LACTATE, POTASSIUM CHLORIDE, AND CALCIUM CHLORIDE: .6; .31; .03; .02 INJECTION, SOLUTION INTRAVENOUS at 09:07

## 2025-02-21 RX ADMIN — PROPOFOL 140 MG: 10 INJECTION, EMULSION INTRAVENOUS at 08:45

## 2025-02-21 RX ADMIN — PROPOFOL 30 MG: 10 INJECTION, EMULSION INTRAVENOUS at 08:56

## 2025-02-21 RX ADMIN — DEXMEDETOMIDINE 8 MCG: 100 INJECTION, SOLUTION INTRAVENOUS at 08:45

## 2025-02-21 NOTE — ANESTHESIA POSTPROCEDURE EVALUATION
Post-Op Assessment Note    CV Status:  Stable  Pain Score: 0    Pain management: adequate       Mental Status:  Awake   Hydration Status:  Stable   PONV Controlled:  None   Airway Patency:  Patent     Post Op Vitals Reviewed: Yes    No anethesia notable event occurred.    Staff: CRNA           Last Filed PACU Vitals:  Vitals Value Taken Time   Temp 98.1 °F (36.7 °C) 02/21/25 0918   Pulse 72 02/21/25 0918   BP 88/51 02/21/25 0918   Resp 19 02/21/25 0918   SpO2 98 % 02/21/25 0918       Modified Rommel:     Vitals Value Taken Time   Activity 2 02/21/25 0919   Respiration 2 02/21/25 0919   Circulation 2 02/21/25 0919   Consciousness 1 02/21/25 0919   Oxygen Saturation 2 02/21/25 0919     Modified Rommel Score: 9

## 2025-02-21 NOTE — ANESTHESIA PREPROCEDURE EVALUATION
Procedure:  COLONOSCOPY  EGD    Relevant Problems   CARDIO   (+) Migraine without status migrainosus, not intractable      GI/HEPATIC   (+) Gastroesophageal reflux disease without esophagitis      NEURO/PSYCH   (+) Anxiety   (+) Migraine without status migrainosus, not intractable        Physical Exam    Airway    Mallampati score: II  TM Distance: >3 FB  Neck ROM: full     Dental   No notable dental hx     Cardiovascular  Rhythm: regular, Rate: normal    Pulmonary   Breath sounds clear to auscultation    Other Findings  Intercisor Distance > 3cm    post-pubertal.      Anesthesia Plan  ASA Score- 2     Anesthesia Type- IV sedation with anesthesia with ASA Monitors.         Additional Monitors:     Airway Plan:     Comment: NPO appropriate. Discussed benefits/risks of monitored anesthetic care which involves providing a dynamic level of mild to deep sedation. Complications include awareness and/or airway obstruction/aspiration which may necessitate conversion to general anesthesia. All questions answered. Patient understands and wishes to proceed. .       Plan Factors-Exercise tolerance (METS): >4 METS.    Chart reviewed. EKG reviewed.  Existing labs reviewed.                   Induction-     Postoperative Plan- Plan for postoperative opioid use.         Informed Consent- Anesthetic plan and risks discussed with patient.  I personally reviewed this patient with the CRNA. Discussed and agreed on the Anesthesia Plan with the CRNA..      NPO Status:  Vitals Value Taken Time   Date of last liquid 02/21/25 02/21/25 0742   Time of last liquid 0330 02/21/25 0742   Date of last solid 02/19/25 02/21/25 0742   Time of last solid 2000 02/21/25 0742

## 2025-02-21 NOTE — H&P
History and Physical - [facility]  Lisa Padilla 55 y.o. female MRN: [id number]    HPI: Lisa Padilla is a 55 y.o. year old female who presents for evaluation of GERD, colitis and change in bowel habits.      Review of Systems    Historical Information   Past Medical History:   Diagnosis Date    Anxiety     Colitis     Disease of thyroid gland     Erythema multiforme     GERD (gastroesophageal reflux disease)     Hx of basal cell carcinoma 06/22/2023    Hx of oral aphthous ulcers     Lupus     Migraine headache     Osteoarthritis     Thyroid nodule     Ulcerative colitis (HCC)     Undifferentiated connective tissue disease (HCC)      Past Surgical History:   Procedure Laterality Date    EGD AND COLONOSCOPY N/A 10/04/2018    Procedure: EGD AND COLONOSCOPY;  Surgeon: Heidy Bernard MD;  Location: [facility];  Service: Gastroenterology    FACIAL COSMETIC SURGERY Right     mole surgery for cancer    THYROIDECTOMY Right      Social History   Social History     Substance and Sexual Activity   Alcohol Use Yes    Comment: occassional      Social History     Substance and Sexual Activity   Drug Use No     Social History     Tobacco Use   Smoking Status Never   Smokeless Tobacco Never     Family History   Problem Relation Age of Onset    Breast cancer Mother 70    Coronary artery disease Mother     Cancer Mother     Heart disease Mother     Lymphoma Father 79    Cancer Father         Non-hosgekins lymphoma    Diabetes Maternal Grandmother     Lymphoma Maternal Grandmother     Cancer Maternal Grandmother     No Known Problems Maternal Grandfather     No Known Problems Paternal Grandmother     No Known Problems Paternal Grandfather     No Known Problems Brother     No Known Problems Brother     No Known Problems Brother     No Known Problems Brother     No Known Problems Son     Heart disease Maternal Aunt     No Known Problems Maternal Aunt     No Known Problems Paternal Aunt        Meds/Allergies     Not in  a hospital admission.    Allergies   Allergen Reactions    Doxycycline Rash    Keflex [Cephalexin] Rash       Objective     /63   Pulse 78   Temp 97.6 °F (36.4 °C) (Temporal)   Resp 16   Ht 5' (1.524 m)   Wt 56.9 kg (125 lb 6.4 oz)   LMP 01/20/2025   SpO2 100%   BMI 24.49 kg/m²       PHYSICAL EXAM    Gen: NAD  CV: RRR  CHEST: Clear  ABD: soft, NT/ND  EXT: no edema  Neuro: AAO      ASSESSMENT/PLAN:  This is a 55 y.o. year old female here for evaluation of chronic GERD, change in bowel habits and findings of colitis on imaging.    PLAN:   Procedure: EGD and colonoscopy.

## 2025-02-25 PROCEDURE — 88305 TISSUE EXAM BY PATHOLOGIST: CPT | Performed by: PATHOLOGY

## 2025-02-25 PROCEDURE — 88342 IMHCHEM/IMCYTCHM 1ST ANTB: CPT | Performed by: PATHOLOGY

## 2025-02-25 PROCEDURE — 88313 SPECIAL STAINS GROUP 2: CPT | Performed by: PATHOLOGY

## 2025-02-25 PROCEDURE — 88341 IMHCHEM/IMCYTCHM EA ADD ANTB: CPT | Performed by: PATHOLOGY

## 2025-02-26 ENCOUNTER — PREP FOR PROCEDURE (OUTPATIENT)
Age: 55
End: 2025-02-26

## 2025-02-26 ENCOUNTER — RESULTS FOLLOW-UP (OUTPATIENT)
Age: 55
End: 2025-02-26

## 2025-02-26 DIAGNOSIS — K22.70 BARRETT'S ESOPHAGUS WITHOUT DYSPLASIA: Primary | ICD-10-CM

## 2025-03-14 PROBLEM — H10.31 ACUTE BACTERIAL CONJUNCTIVITIS OF RIGHT EYE: Status: RESOLVED | Noted: 2025-02-12 | Resolved: 2025-03-14

## 2025-03-18 ENCOUNTER — APPOINTMENT (OUTPATIENT)
Dept: LAB | Facility: CLINIC | Age: 55
End: 2025-03-18
Payer: COMMERCIAL

## 2025-03-18 DIAGNOSIS — M35.9 UNDIFFERENTIATED CONNECTIVE TISSUE DISEASE (HCC): ICD-10-CM

## 2025-03-18 LAB
ALBUMIN SERPL BCG-MCNC: 4.2 G/DL (ref 3.5–5)
ALP SERPL-CCNC: 78 U/L (ref 34–104)
ALT SERPL W P-5'-P-CCNC: 10 U/L (ref 7–52)
ANION GAP SERPL CALCULATED.3IONS-SCNC: 12 MMOL/L (ref 4–13)
AST SERPL W P-5'-P-CCNC: 15 U/L (ref 13–39)
BACTERIA UR QL AUTO: NORMAL /HPF
BASOPHILS # BLD AUTO: 0.06 THOUSANDS/ÂΜL (ref 0–0.1)
BASOPHILS NFR BLD AUTO: 1 % (ref 0–1)
BILIRUB SERPL-MCNC: 0.45 MG/DL (ref 0.2–1)
BILIRUB UR QL STRIP: NEGATIVE
BUN SERPL-MCNC: 14 MG/DL (ref 5–25)
CALCIUM SERPL-MCNC: 9.3 MG/DL (ref 8.4–10.2)
CHLORIDE SERPL-SCNC: 100 MMOL/L (ref 96–108)
CLARITY UR: CLEAR
CO2 SERPL-SCNC: 25 MMOL/L (ref 21–32)
COLOR UR: COLORLESS
CREAT SERPL-MCNC: 0.87 MG/DL (ref 0.6–1.3)
CRP SERPL QL: 1.6 MG/L
EOSINOPHIL # BLD AUTO: 0.16 THOUSAND/ÂΜL (ref 0–0.61)
EOSINOPHIL NFR BLD AUTO: 3 % (ref 0–6)
ERYTHROCYTE [DISTWIDTH] IN BLOOD BY AUTOMATED COUNT: 11.8 % (ref 11.6–15.1)
ERYTHROCYTE [SEDIMENTATION RATE] IN BLOOD: 15 MM/HOUR (ref 0–29)
GFR SERPL CREATININE-BSD FRML MDRD: 75 ML/MIN/1.73SQ M
GLUCOSE P FAST SERPL-MCNC: 96 MG/DL (ref 65–99)
GLUCOSE UR STRIP-MCNC: NEGATIVE MG/DL
HCT VFR BLD AUTO: 39.6 % (ref 34.8–46.1)
HGB BLD-MCNC: 13.1 G/DL (ref 11.5–15.4)
HGB UR QL STRIP.AUTO: NEGATIVE
IMM GRANULOCYTES # BLD AUTO: 0.02 THOUSAND/UL (ref 0–0.2)
IMM GRANULOCYTES NFR BLD AUTO: 0 % (ref 0–2)
KETONES UR STRIP-MCNC: NEGATIVE MG/DL
LEUKOCYTE ESTERASE UR QL STRIP: NEGATIVE
LYMPHOCYTES # BLD AUTO: 2.5 THOUSANDS/ÂΜL (ref 0.6–4.47)
LYMPHOCYTES NFR BLD AUTO: 39 % (ref 14–44)
MCH RBC QN AUTO: 30.5 PG (ref 26.8–34.3)
MCHC RBC AUTO-ENTMCNC: 33.1 G/DL (ref 31.4–37.4)
MCV RBC AUTO: 92 FL (ref 82–98)
MONOCYTES # BLD AUTO: 0.44 THOUSAND/ÂΜL (ref 0.17–1.22)
MONOCYTES NFR BLD AUTO: 7 % (ref 4–12)
NEUTROPHILS # BLD AUTO: 3.22 THOUSANDS/ÂΜL (ref 1.85–7.62)
NEUTS SEG NFR BLD AUTO: 50 % (ref 43–75)
NITRITE UR QL STRIP: NEGATIVE
NON-SQ EPI CELLS URNS QL MICRO: NORMAL /HPF
NRBC BLD AUTO-RTO: 0 /100 WBCS
PH UR STRIP.AUTO: 6.5 [PH]
PLATELET # BLD AUTO: 240 THOUSANDS/UL (ref 149–390)
PMV BLD AUTO: 10 FL (ref 8.9–12.7)
POTASSIUM SERPL-SCNC: 4 MMOL/L (ref 3.5–5.3)
PROT SERPL-MCNC: 7 G/DL (ref 6.4–8.4)
PROT UR STRIP-MCNC: NEGATIVE MG/DL
RBC # BLD AUTO: 4.3 MILLION/UL (ref 3.81–5.12)
RBC #/AREA URNS AUTO: NORMAL /HPF
SODIUM SERPL-SCNC: 137 MMOL/L (ref 135–147)
SP GR UR STRIP.AUTO: 1 (ref 1–1.03)
UROBILINOGEN UR STRIP-ACNC: <2 MG/DL
WBC # BLD AUTO: 6.4 THOUSAND/UL (ref 4.31–10.16)
WBC #/AREA URNS AUTO: NORMAL /HPF

## 2025-03-18 PROCEDURE — 86140 C-REACTIVE PROTEIN: CPT

## 2025-03-18 PROCEDURE — 85652 RBC SED RATE AUTOMATED: CPT

## 2025-03-18 PROCEDURE — 85025 COMPLETE CBC W/AUTO DIFF WBC: CPT

## 2025-03-18 PROCEDURE — 81001 URINALYSIS AUTO W/SCOPE: CPT

## 2025-03-18 PROCEDURE — 80053 COMPREHEN METABOLIC PANEL: CPT

## 2025-03-18 PROCEDURE — 36415 COLL VENOUS BLD VENIPUNCTURE: CPT

## 2025-03-27 ENCOUNTER — HOSPITAL ENCOUNTER (OUTPATIENT)
Dept: ULTRASOUND IMAGING | Facility: CLINIC | Age: 55
End: 2025-03-27
Payer: COMMERCIAL

## 2025-03-27 DIAGNOSIS — R92.30 DENSE BREAST: ICD-10-CM

## 2025-03-27 PROCEDURE — 76641 ULTRASOUND BREAST COMPLETE: CPT

## 2025-03-28 ENCOUNTER — RESULTS FOLLOW-UP (OUTPATIENT)
Dept: LABOR AND DELIVERY | Facility: HOSPITAL | Age: 55
End: 2025-03-28

## 2025-03-30 NOTE — PROGRESS NOTES
Assessment/Plan:    Undifferentiated connective tissue disease (HCC)  History of low-grade positive NOEMI, with history aphthous mouth ulcers, with no typical stigmata for lupus.  Lupus Avise in the past low probability for lupus.  No history Raynaud's, or sicca symptoms.  No photosensitivity. Skin biopsy lower extremity lesions consistent with erythema multiforme.  She was started on Valtrex because of positive HSV 2 IgG antibody, and association with erythema multiforme.  She did have temporary response to the Valtrex, and has used that intermittently over the past 2 years, however, lesions continue to recur, most commonly on her lower extremities.  She was seen for a 2nd opinion at [facility] and the dermatologist felt that her symptoms were consistent with cutaneous lupus with oral ulcers.  The patient has continued with Plaquenil 300 mg daily, with persistence of intermittent flares.  Dr. Mixon at [facility] has suggested consideration for dapsone for persistent flares.  She never started this because of concerns of side effects.  She briefly discontinued Plaquenil but went back on and has continued to the present time. The last lupus Avise showed low probability for lupus in June of 2021.  Will order repeat.  The lesions observed at this office visit look more consistent with erythema multiforme than cutaneous lupus.  I have suggested that she discuss an updated skin biopsy to reevaluate the status of her disease.  I also suggested consideration for a second rheumatologic opinion with Dr. Shiela Ocampo at [place], who is a foremost authority in lupus.  Monitor disease activity and medication toxicity with lab work as ordered.  Patient to continue follow-up with [facility].  Follow-up with me in 6 months or sooner if needed.    Hx of oral aphthous ulcers  Continue follow-up and treatment as prescribed by periodontist for aphthous mouth ulcers.  She had a 2nd opinion with Dermatology at [facility], who felt her symptoms were  consistent with cutaneous lupus with oral ulcers and has discussed a trial of dapsone.  I have suggested getting an HLA-B 51 to rule out Behcet's disease.  This has been ordered.  At present she continues on Plaquenil 300 mg daily.  Patient should continue routine eye follow-up to rule out Plaquenil toxicity.  Will likely obtain Plaquenil Avise at the next office visit in 6 months.  She will follow-up with [facility] as scheduled.    Erythema multiforme  Prior skin biopsy lower extremity lesions 2022 consistent with erythema multiform a with questionable relationship to HSV infection.  Serology positive for HSV 2 IgG antibody.  She did have initial success with Valtrex however has continued to have flare of the lower extremity lesions.  Feel the patient would benefit with a new workup with a new biopsy.  Have referred the patient for updated lupus Avise CTD diagnostic testing for further evaluation of disease diagnosis and activity.  I have additionally ordered HLA-B 51 in view of history of aphthous ulcers to rule out less likely possibility of Behcet's disease.  I have additionally suggested evaluation with Dr. Shiela Ocampo at [place], who is a leading authority in lupus.  Follow-up Dermatology at [facility] as scheduled.    Behcet's disease (HCC)  Not enough clinical evidence to support a diagnosis of Behcet's.  Felt to be more likely erythema multiforme questionably related to HSV infection.  Patient has had limited success with Valtrex.  No current aphthous stomatitis.  Rule out less likely Behcet's disease.  Will check HLA-B 51 for further evaluation.  She uses an oral rinse p.r.n for aphthous ulcers.  Continue to monitor.    Migraine without status migrainosus, not intractable  Migraine headaches quiescent.  Continue to monitor.    Cutaneous lupus erythematosus  Cutaneous lupus clinical diagnosis supported by Dr. Shauna Villar at [facility], with associated oral ulcers, who continues on Plaquenil 300 mg daily.   The lesions on the lower extremities at the time of this visit do not appear to be consistent with cutaneous lupus.  They are more consistent with erythema multiforme.  Will refer patient for updated lupus Avise CTD diagnostic testing to further evaluate diagnosis as well as disease activity.  She will continue with Plaquenil as ordered pending follow-up with dermatologist at [place], who is considering dapsone if patient continues to have significant skin flares.  I have suggested second opinion with nationally renowned lupus specialist, Dr. Shiela Ocampo at [place].  I do feel that the patient needs an updated skin biopsy to reassess skin disease so as to have additional information to support treatment plan.  Monitor disease activity and medication toxicity with lab work as ordered.  Continue to monitor.    Current chronic use of systemic steroids  History of chronic steroid usage.  Rule out steroid osteoporosis.  Refer for DEXA to evaluate.  Continue calcium and vitamin D supplement and home exercise program as tolerated.  Continue to monitor.    Gastroesophageal reflux disease without esophagitis  EGD dated 2/21/2025 significant for gastritis with no evidence for Helicobacter pylori.  Nonspecific inflammation distal esophagus negative for eosinophilic esophagitis.  Patient was started on omeprazole 40 mg daily.  Follow-up GI as scheduled.  Continue to monitor.         Problem List Items Addressed This Visit     Hx of oral aphthous ulcers    Continue follow-up and treatment as prescribed by periodontist for aphthous mouth ulcers.  She had a 2nd opinion with Dermatology at [facility], who felt her symptoms were consistent with cutaneous lupus with oral ulcers and has discussed a trial of dapsone.  I have suggested getting an HLA-B 51 to rule out Behcet's disease.  This has been ordered.  At present she continues on Plaquenil 300 mg daily.  Patient should continue routine eye follow-up to rule out Plaquenil toxicity.   Will likely obtain Plaquenil Avise at the next office visit in 6 months.  She will follow-up with [facility] as scheduled.         Erythema multiforme    Prior skin biopsy lower extremity lesions 2022 consistent with erythema multiform a with questionable relationship to HSV infection.  Serology positive for HSV 2 IgG antibody.  She did have initial success with Valtrex however has continued to have flare of the lower extremity lesions.  Feel the patient would benefit with a new workup with a new biopsy.  Have referred the patient for updated lupus Avise CTD diagnostic testing for further evaluation of disease diagnosis and activity.  I have additionally ordered HLA-B 51 in view of history of aphthous ulcers to rule out less likely possibility of Behcet's disease.  I have additionally suggested evaluation with Dr. Shiela Ocampo at [place], who is a leading authority in lupus.  Follow-up Dermatology at [facility] as scheduled.         Undifferentiated connective tissue disease (HCC) - Primary    History of low-grade positive NOEMI, with history aphthous mouth ulcers, with no typical stigmata for lupus.  Lupus Avise in the past low probability for lupus.  No history Raynaud's, or sicca symptoms.  No photosensitivity. Skin biopsy lower extremity lesions consistent with erythema multiforme.  She was started on Valtrex because of positive HSV 2 IgG antibody, and association with erythema multiforme.  She did have temporary response to the Valtrex, and has used that intermittently over the past 2 years, however, lesions continue to recur, most commonly on her lower extremities.  She was seen for a 2nd opinion at [facility] and the dermatologist felt that her symptoms were consistent with cutaneous lupus with oral ulcers.  The patient has continued with Plaquenil 300 mg daily, with persistence of intermittent flares.  Dr. Mixon at [facility] has suggested consideration for dapsone for persistent flares.  She never started this because  of concerns of side effects.  She briefly discontinued Plaquenil but went back on and has continued to the present time. The last lupus Avise showed low probability for lupus in June of 2021.  Will order repeat.  The lesions observed at this office visit look more consistent with erythema multiforme than cutaneous lupus.  I have suggested that she discuss an updated skin biopsy to reevaluate the status of her disease.  I also suggested consideration for a second rheumatologic opinion with Dr. Shiela Ocampo at [place], who is a foremost authority in lupus.  Monitor disease activity and medication toxicity with lab work as ordered.  Patient to continue follow-up with [facility].  Follow-up with me in 6 months or sooner if needed.         Relevant Orders    MISCELLANEOUS LAB TEST    Migraine without status migrainosus, not intractable    Migraine headaches quiescent.  Continue to monitor.         Gastroesophageal reflux disease without esophagitis    EGD dated 2/21/2025 significant for gastritis with no evidence for Helicobacter pylori.  Nonspecific inflammation distal esophagus negative for eosinophilic esophagitis.  Patient was started on omeprazole 40 mg daily.  Follow-up GI as scheduled.  Continue to monitor.         RESOLVED: Behcet's disease (HCC)    Not enough clinical evidence to support a diagnosis of Behcet's.  Felt to be more likely erythema multiforme questionably related to HSV infection.  Patient has had limited success with Valtrex.  No current aphthous stomatitis.  Rule out less likely Behcet's disease.  Will check HLA-B 51 for further evaluation.  She uses an oral rinse p.r.n for aphthous ulcers.  Continue to monitor.         Relevant Orders    HLA-B51 BEHCET'S DISEASE ASSOCIATION TEST    Cutaneous lupus erythematosus    Cutaneous lupus clinical diagnosis supported by Dr. Shauna Villar at [facility], with associated oral ulcers, who continues on Plaquenil 300 mg daily.  The lesions on the lower  extremities at the time of this visit do not appear to be consistent with cutaneous lupus.  They are more consistent with erythema multiforme.  Will refer patient for updated lupus Avise CTD diagnostic testing to further evaluate diagnosis as well as disease activity.  She will continue with Plaquenil as ordered pending follow-up with dermatologist at [place], who is considering dapsone if patient continues to have significant skin flares.  I have suggested second opinion with nationally renowned lupus specialist, Dr. Shiela Ocampo at [place].  I do feel that the patient needs an updated skin biopsy to reassess skin disease so as to have additional information to support treatment plan.  Monitor disease activity and medication toxicity with lab work as ordered.  Continue to monitor.         Current chronic use of systemic steroids    History of chronic steroid usage.  Rule out steroid osteoporosis.  Refer for DEXA to evaluate.  Continue calcium and vitamin D supplement and home exercise program as tolerated.  Continue to monitor.         Relevant Orders    DXA bone density spine hip and pelvis             Reviewed records, labs, and imaging with the patient in detail.  Counseled patient.  Discussion regarding my findings and recommendations.  Office visit with documentation 50 min.    Subjective:      Patient ID: Lisa Padilla is a 55 y.o. female.    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  Immunofluorescence was negative.  She was last seen in September 2022 by me and has been following with dermatologist Dr. Shauna Mixon at [facility]. She has been on Plaquenil since January of 2021.  She continues on 300 mg of Plaquenil daily.  Topicals have not been of  great benefit, and Plaquenil has afforded minimal benefit.  Her rash which has been mainly lower extremities, started around Spring 2020 when she developed significant panic regarding COVID.  Lupus avise diagnostic, with a -2.0  index making the diagnosis of lupus less likely.  At the time that she had the lab, she had only been on Plaquenil for approximately 1 month. Mouth sores improved with as needed compounded treatment prescribed by periodontist. She has seen at least two dermatologists.  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  Lab work revealed positive HSV 2 IgG, and patient was started on Valtrex 1000 mg daily.  She initially had response with Valtrex but ultimately continued to have recurrent flares of skin disease and mouth ulcers.  Patient had 2nd opinion with Dermatology at [facility].  Dr. Mixon felt that her symptoms were consistent with cutaneous lupus with oral ulcers and she suggested dapsone, however she has continued treatment with Plaquenil.  Patient had a flare of the lower extremity rash in October of last year in November she was treated with Valtrex for herpes simplex outbreak in her groin for approximately 10 days.  In January of this year she was diagnosed with gastritis and started 40 mg of omeprazole in February.  She had EGD and colonoscopy dated 2/21/2025 significant for gastritis with no evidence for Helicobacter pylori.  There was nonspecific inflammation noted in the distal esophagus with few eosinophils noted.  Colonoscopy significant for benign polyp ascending colon.  She had a lesion removed from her forehead which was consistent with dermal nevus.  She has no significant morning stiffness, joint pain, or joint swelling.  She does have sleep disturbance due to anxiety with some fatigue. Years  ago she did have irritable bowel symptoms, which are currently quiescent.  Additional medical problems include GERD, history of thyroid nodule status post partial thyroidectomy, history migraine headaches, anxiety.  Patient did have COVID in January 2022.    Lab work dated 3/24/2025 significant for HSV not detected by molecular assay with swab.  Lab work dated 3/18/2025 significant for CRP 1.6.  Sed rate 15.  CBC unremarkable.  Calcium 9.3 with estimated GFR 75.  Urinalysis benign.  09/14/2022 significant for urinalysis with microscopic pyuria with no symptoms.  CBC unremarkable.  Double-stranded DNA antibodies negative.  C3 and C4 complement normal.  C-reactive protein mildly elevated 8.5 with normal less than 7.0.  Alk-phos mildly elevated at 130.  Estimated .  Lab work dated 05/23/2022 significant for hepatitis-B surface antigen and hepatitis-B core antibody negative.  QuantiFERON gold negative.  Total cholesterol 196.  Triglycerides 66.  HDL 80.  .  Hepatitis-C negative.  Mycoplasma elevated IgG with negative IgM.  [place] viral panel suggestive of old infection.  Review of lab work dated 03/17/2022 significant for C3 and C4 complements normal.  CBC unremarkable.  Random glucose 110.  Estimated GFR 92.  CRP less than 3.0.  Double-stranded DNA antibodies negative.  Urinalysis benign.  Patient did have serologies for bullous pemphigus including Desmoglein 1 and 3, and bullous pemphigus 180 and 230, all of which were negative in 2021.    Rash  This is a chronic problem. The current episode started more than 1 year ago. The problem has been gradually worsening since onset. The affected locations include the left arm, left hand, left wrist, left fingers, left upper leg, left leg, right arm, right hand, right wrist, right fingers, right upper leg and right leg. The rash is characterized by blistering and redness. She was exposed to nothing. Associated symptoms include fatigue and joint pain.  Pertinent negatives include no anorexia, congestion, cough, diarrhea, facial edema, fever, rhinorrhea, shortness of breath, sore throat or vomiting.       Allergies  Allergies   Allergen Reactions   • Doxycycline Rash   • Keflex [Cephalexin] Rash       Home Medications    Current Outpatient Medications:   •  ALPRAZolam (XANAX) 0.25 mg tablet, TAKE 1 TABLET(0.25 MG) BY MOUTH DAILY AT BEDTIME AS NEEDED FOR ANXIETY, Disp: 25 tablet, Rfl: 0  •  Cholecalciferol (VITAMIN D3 PO), Take by mouth, Disp: , Rfl:   •  clobetasol (TEMOVATE) 0.05 % ointment, , Disp: , Rfl:   •  Cyanocobalamin (VITAMIN B12 PO), Take by mouth, Disp: , Rfl:   •  dexamethasone 0.5 MG/5ML elixir, Rinse with 1 tspful for 2 mins 2-4 times / day then spit out x 5-7 days, Disp: 237 mL, Rfl: 0  •  famotidine (PEPCID) 40 MG tablet, Take 1 tablet (40 mg total) by mouth daily, Disp: 90 tablet, Rfl: 0  •  ferrous sulfate 325 (65 FE) MG EC tablet, Take 325 mg by mouth, Disp: , Rfl:   •  hydroxychloroquine (PLAQUENIL) 200 mg tablet, 300 MG DAILY (1.5 TABLETS PER DAY), 1 TABLET IN AM, HALF TABLET IN PM, Disp: , Rfl:   •  omeprazole (PriLOSEC) 40 MG capsule, Take 1 capsule (40 mg total) by mouth 2 (two) times a day before meals (Patient taking differently: Take 40 mg by mouth daily), Disp: 180 capsule, Rfl: 1  •  valACYclovir (VALTREX) 500 mg tablet, Use one tablet twice daily with outbreaks. Start at sx onset. (Patient taking differently: Take 500 mg by mouth daily . Start at sx onset.), Disp: 60 tablet, Rfl: 6  •  docusate sodium (COLACE) 100 mg capsule, Take 1 capsule (100 mg total) by mouth every 12 (twelve) hours for 14 days (Patient not taking: Reported on 1/14/2025), Disp: 14 capsule, Rfl: 0  •  polyethylene glycol (GOLYTELY) 4000 mL solution, Take 4,000 mL by mouth once for 1 dose, Disp: 4000 mL, Rfl: 0    Past Medical History  Past Medical History:   Diagnosis Date   • Anxiety    • Colitis    • Disease of thyroid gland    • Erythema multiforme    • GERD  (gastroesophageal reflux disease)    • Hx of basal cell carcinoma 06/22/2023   • Hx of oral aphthous ulcers    • Lupus    • Migraine headache    • Osteoarthritis    • Thyroid nodule    • Ulcerative colitis (HCC)    • Undifferentiated connective tissue disease (HCC)        Past Surgical History   Past Surgical History:   Procedure Laterality Date   • EGD AND COLONOSCOPY N/A 10/04/2018    Procedure: EGD AND COLONOSCOPY;  Surgeon: Heidy Bernard MD;  [facility];  Service: Gastroenterology   • FACIAL COSMETIC SURGERY Right     mole surgery for cancer   • THYROIDECTOMY Right        Family History   Family History   Problem Relation Age of Onset   • Breast cancer Mother 70   • Coronary artery disease Mother    • Heart disease Mother    • Non Hodgkin's lymphoma (HCC) Father    • Diabetes Maternal Grandmother    • Lymphoma Maternal Grandmother    • No Known Problems Maternal Grandfather    • Dementia Paternal Grandmother    • Natural death of unknown etiology Paternal Grandfather    • Basal cell carcinoma Brother    • Basal cell carcinoma Brother    • No Known Problems Brother    • No Known Problems Brother    • No Known Problems Son    • Heart disease Maternal Aunt    • No Known Problems Maternal Aunt    • No Known Problems Paternal Aunt    • No Known Problems Paternal Uncle    • Heart attack Maternal Uncle        The following portions of the patient's history were reviewed and updated as appropriate: allergies, current medications, past family history, past medical history, past social history, past surgical history, and problem list.    Review of Systems   Constitutional:  Positive for fatigue. Negative for chills and fever.   HENT:  Positive for mouth sores. Negative for congestion, ear pain, rhinorrhea and sore throat.         Mouth sores with intermittent flares treated with oral rinse prn compounded as ordered by periodontist..   Eyes:  Negative for pain and visual disturbance.   Respiratory:  Negative  for cough and shortness of breath.    Cardiovascular:  Negative for chest pain and palpitations.   Gastrointestinal:  Negative for abdominal pain, anorexia, diarrhea and vomiting.        GERD sx's  Recent diagnosis of gastritis on omeprazole 40 mg daily   Genitourinary:  Negative for dysuria and hematuria.   Musculoskeletal:  Positive for joint pain. Negative for arthralgias and back pain.        No significant morning stiffness, joint pain, or joint swelling.  No back pain. No Raynauds.   Skin:  Positive for rash. Negative for color change.        Chronic rash extremities described as red circular plaques with central darker red areas.  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  Immunofluorescence negative.  Dermatology evaluation in January 2022 was done with subsequent biopsy lower extremity lesions consistent with erythema multiforme felt to be exacerbated by herpes simplex.  She did have a positive HSV 2 IgG and was started on Valtrex  1000 mg daily.  She has had recurrent skin flares with most recent in October 2024.  Second opinion with dermatologist at [facility], was significant for recommendation for dapsone at the time of the next flare.  It was felt that her symptoms were consistent with cutaneous lupus with oral ulcers.  Patient has continued follow-up at [facility] for dermatology.   Neurological:  Negative for seizures and syncope.   Psychiatric/Behavioral:          Anxiety    All other systems reviewed and are negative.        Objective:      /70   Pulse 86   Ht 5' (1.524 m)   Wt 57.3 kg (126 lb 6.4 oz)   SpO2 96%   BMI 24.69 kg/m²          Physical Exam  Vitals reviewed.   Constitutional:       Appearance: Normal appearance.   HENT:      Head: Normocephalic.      Nose:      Comments: Nose and throat unremarkable     Mouth/Throat:      Comments: Slight irritation/residual from recent mouth ulcer upper lip  Eyes:      Extraocular Movements: Extraocular movements intact.    Neck:      Comments: Without masses, thyromegaly, lymphadenopathy  Cardiovascular:      Rate and Rhythm: Regular rhythm.   Pulmonary:      Breath sounds: Normal breath sounds.   Abdominal:      Palpations: Abdomen is soft.   Musculoskeletal:      Cervical back: Neck supple.      Comments: Neck full range of motion.  Shoulders, elbows, and wrists full range of motion.  Tinel's negative bilaterally.  Hands mild interphalangeal osteoarthritis changes.  No synovitis noted.  Straight leg raising negative bilaterally.  Hips full range of motion.  Knees full range of motion with patellofemoral crepitus.  Ankles full range of motion.  Feet rearfoot hyperpronation with early hallux valgus deformities, high insteps, and midfoot cavus deformities.  No synovitis noted.   Skin:     General: Skin is warm.      Comments: Scattered LE lesions more consistent with erythema multiforme than cutaneous lupus   Neurological:      General: No focal deficit present.               This note was written in part using the assistance of the [employer] [id number] microphone system. Those portions using this system have been dictated and not read.

## 2025-03-31 ENCOUNTER — OFFICE VISIT (OUTPATIENT)
Age: 55
End: 2025-03-31
Payer: COMMERCIAL

## 2025-03-31 VITALS
DIASTOLIC BLOOD PRESSURE: 70 MMHG | SYSTOLIC BLOOD PRESSURE: 100 MMHG | BODY MASS INDEX: 24.81 KG/M2 | HEART RATE: 86 BPM | WEIGHT: 126.4 LBS | OXYGEN SATURATION: 96 % | HEIGHT: 60 IN

## 2025-03-31 DIAGNOSIS — M35.9 UNDIFFERENTIATED CONNECTIVE TISSUE DISEASE (HCC): Primary | ICD-10-CM

## 2025-03-31 DIAGNOSIS — L93.2 CUTANEOUS LUPUS ERYTHEMATOSUS: ICD-10-CM

## 2025-03-31 DIAGNOSIS — Z79.52 CURRENT CHRONIC USE OF SYSTEMIC STEROIDS: ICD-10-CM

## 2025-03-31 DIAGNOSIS — L51.9 ERYTHEMA MULTIFORME: ICD-10-CM

## 2025-03-31 DIAGNOSIS — M35.2 BEHCET'S DISEASE (HCC): ICD-10-CM

## 2025-03-31 DIAGNOSIS — G43.909 MIGRAINE WITHOUT STATUS MIGRAINOSUS, NOT INTRACTABLE, UNSPECIFIED MIGRAINE TYPE: ICD-10-CM

## 2025-03-31 DIAGNOSIS — Z87.19 HX OF ORAL APHTHOUS ULCERS: ICD-10-CM

## 2025-03-31 DIAGNOSIS — K21.9 GASTROESOPHAGEAL REFLUX DISEASE WITHOUT ESOPHAGITIS: ICD-10-CM

## 2025-03-31 PROCEDURE — 99215 OFFICE O/P EST HI 40 MIN: CPT | Performed by: INTERNAL MEDICINE

## 2025-03-31 NOTE — PATIENT INSTRUCTIONS
Suggest updated skin biopsy of leg lesions.  Please call Dr. Mixon and tell her that I suggested that you have a fresh biopsy of one of the lower extremity lesions at [facility] and have their pathologist review.  That way we can have updated information to make therapeutic decisions.  Get the lupus test Monday through Thursday before noon using the kit.  I will notify you of results.  You should tell Dr. Mixon, that I suggested she discuss with you the option of seeing Dr. Shiela Ocampo you go to because it did facilitate that she is going to get her records.  Take care, at [place], who is a foremost leader in lupus in the country.

## 2025-04-03 ENCOUNTER — APPOINTMENT (OUTPATIENT)
Dept: LAB | Facility: CLINIC | Age: 55
End: 2025-04-03
Payer: COMMERCIAL

## 2025-04-03 DIAGNOSIS — M35.9 UNDIFFERENTIATED CONNECTIVE TISSUE DISEASE (HCC): ICD-10-CM

## 2025-04-03 PROCEDURE — 36415 COLL VENOUS BLD VENIPUNCTURE: CPT

## 2025-04-06 PROBLEM — Z79.52 CURRENT CHRONIC USE OF SYSTEMIC STEROIDS: Status: ACTIVE | Noted: 2025-04-06

## 2025-04-06 PROBLEM — L93.2 CUTANEOUS LUPUS ERYTHEMATOSUS: Status: ACTIVE | Noted: 2023-02-17

## 2025-04-06 NOTE — ASSESSMENT & PLAN NOTE
History of low-grade positive NOEMI, with history aphthous mouth ulcers, with no typical stigmata for lupus.  Lupus Avise in the past low probability for lupus.  No history Raynaud's, or sicca symptoms.  No photosensitivity. Skin biopsy lower extremity lesions consistent with erythema multiforme.  She was started on Valtrex because of positive HSV 2 IgG antibody, and association with erythema multiforme.  She did have temporary response to the Valtrex, and has used that intermittently over the past 2 years, however, lesions continue to recur, most commonly on her lower extremities.  She was seen for a 2nd opinion at [place] and the dermatologist felt that her symptoms were consistent with cutaneous lupus with oral ulcers.  The patient has continued with Plaquenil 300 mg daily, with persistence of intermittent flares.  Dr. Mixon at [place] has suggested consideration for dapsone for persistent flares.  She never started this because of concerns of side effects.  She briefly discontinued Plaquenil but went back on and has continued to the present time. The last lupus Avise showed low probability for lupus in June of 2021.  Will order repeat.  The lesions observed at this office visit look more consistent with erythema multiforme than cutaneous lupus.  I have suggested that she discuss an updated skin biopsy to reevaluate the status of her disease.  I also suggested consideration for a second rheumatologic opinion with Dr. Shiela Ocampo at [place], who is a foremost authority in lupus.  Monitor disease activity and medication toxicity with lab work as ordered.  Patient to continue follow-up with [facility].  Follow-up with me in 6 months or sooner if needed.

## 2025-04-06 NOTE — ASSESSMENT & PLAN NOTE
Continue follow-up and treatment as prescribed by periodontist for aphthous mouth ulcers.  She had a 2nd opinion with Dermatology at [place], who felt her symptoms were consistent with cutaneous lupus with oral ulcers and has discussed a trial of dapsone.  I have suggested getting an HLA-B 51 to rule out Behcet's disease.  This has been ordered.  At present she continues on Plaquenil 300 mg daily.  Patient should continue routine eye follow-up to rule out Plaquenil toxicity.  Will likely obtain Plaquenil Avise at the next office visit in 6 months.  She will follow-up with [facility] as scheduled.

## 2025-04-06 NOTE — ASSESSMENT & PLAN NOTE
Prior skin biopsy lower extremity lesions 2022 consistent with erythema multiform a with questionable relationship to HSV infection.  Serology positive for HSV 2 IgG antibody.  She did have initial success with Valtrex however has continued to have flare of the lower extremity lesions.  Feel the patient would benefit with a new workup with a new biopsy.  Have referred the patient for updated lupus Avise CTD diagnostic testing for further evaluation of disease diagnosis and activity.  I have additionally ordered HLA-B 51 in view of history of aphthous ulcers to rule out less likely possibility of Behcet's disease.  I have additionally suggested evaluation with Dr. Shiela Ocampo at [place], who is a leading authority in lupus.  Follow-up Dermatology at [place] as scheduled.

## 2025-04-07 ENCOUNTER — HOSPITAL ENCOUNTER (OUTPATIENT)
Dept: RADIOLOGY | Age: 55
Discharge: HOME | End: 2025-04-07
Payer: COMMERCIAL

## 2025-04-07 VITALS — HEIGHT: 60 IN | BODY MASS INDEX: 25.13 KG/M2 | WEIGHT: 128 LBS

## 2025-04-07 DIAGNOSIS — Z79.52 CURRENT CHRONIC USE OF SYSTEMIC STEROIDS: ICD-10-CM

## 2025-04-07 PROCEDURE — 77080 DXA BONE DENSITY AXIAL: CPT

## 2025-04-07 NOTE — ASSESSMENT & PLAN NOTE
Cutaneous lupus clinical diagnosis supported by Dr. Shauna Villar at [place], with associated oral ulcers, who continues on Plaquenil 300 mg daily.  The lesions on the lower extremities at the time of this visit do not appear to be consistent with cutaneous lupus.  They are more consistent with erythema multiforme.  Will refer patient for updated lupus Avise CTD diagnostic testing to further evaluate diagnosis as well as disease activity.  She will continue with Plaquenil as ordered pending follow-up with dermatologist at [place], who is considering dapsone if patient continues to have significant skin flares.  I have suggested second opinion with nationally renowned lupus specialist, Dr. Shiela Ocampo at [place].  I do feel that the patient needs an updated skin biopsy to reassess skin disease so as to have additional information to support treatment plan.  Monitor disease activity and medication toxicity with lab work as ordered.  Continue to monitor.

## 2025-04-07 NOTE — ASSESSMENT & PLAN NOTE
EGD dated 2/21/2025 significant for gastritis with no evidence for Helicobacter pylori.  Nonspecific inflammation distal esophagus negative for eosinophilic esophagitis.  Patient was started on omeprazole 40 mg daily.  Follow-up GI as scheduled.  Continue to monitor.

## 2025-04-07 NOTE — ASSESSMENT & PLAN NOTE
Not enough clinical evidence to support a diagnosis of Behcet's.  Felt to be more likely erythema multiforme questionably related to HSV infection.  Patient has had limited success with Valtrex.  No current aphthous stomatitis.  Rule out less likely Behcet's disease.  Will check HLA-B 51 for further evaluation.  She uses an oral rinse p.r.n for aphthous ulcers.  Continue to monitor.

## 2025-04-14 ENCOUNTER — RESULTS FOLLOW-UP (OUTPATIENT)
Age: 55
End: 2025-04-14

## 2025-04-16 NOTE — TELEPHONE ENCOUNTER
Patient returning call advised per Dr. Kendrick, Notify the patient that her DEXA reveals very mild osteopenia with no increased risk for fracture.  Continue calcium and vitamin D supplement.  We will continue to monitor DEXA every 2 years.     Asking if anything came back concerning Avise testing.

## 2025-05-07 RX ORDER — NEOMYCIN SULFATE, POLYMYXIN B SULFATE, AND DEXAMETHASONE 3.5; 10000; 1 MG/G; [USP'U]/G; MG/G
OINTMENT OPHTHALMIC
COMMUNITY
Start: 2025-02-20

## 2025-05-08 ENCOUNTER — OFFICE VISIT (OUTPATIENT)
Dept: FAMILY MEDICINE CLINIC | Facility: CLINIC | Age: 55
End: 2025-05-08

## 2025-05-08 VITALS
HEART RATE: 80 BPM | SYSTOLIC BLOOD PRESSURE: 122 MMHG | OXYGEN SATURATION: 98 % | WEIGHT: 128 LBS | DIASTOLIC BLOOD PRESSURE: 76 MMHG | HEIGHT: 60 IN | BODY MASS INDEX: 25.13 KG/M2

## 2025-05-08 DIAGNOSIS — E04.1 THYROID NODULE: ICD-10-CM

## 2025-05-08 DIAGNOSIS — E61.1 IRON DEFICIENCY: Primary | ICD-10-CM

## 2025-05-08 DIAGNOSIS — L93.0 DISCOID LUPUS ERYTHEMATOSUS: ICD-10-CM

## 2025-05-08 DIAGNOSIS — L51.9 ERYTHEMA MULTIFORME: ICD-10-CM

## 2025-05-08 DIAGNOSIS — E55.9 VITAMIN D DEFICIENCY: ICD-10-CM

## 2025-05-08 DIAGNOSIS — E53.8 B12 DEFICIENCY: ICD-10-CM

## 2025-05-08 DIAGNOSIS — Z00.00 WELL ADULT EXAM: ICD-10-CM

## 2025-05-08 PROBLEM — L03.213 PERIORBITAL CELLULITIS OF RIGHT EYE: Status: RESOLVED | Noted: 2025-02-12 | Resolved: 2025-05-08

## 2025-05-08 PROBLEM — E89.0 HISTORY OF PARTIAL THYROIDECTOMY: Status: ACTIVE | Noted: 2025-05-08

## 2025-05-08 NOTE — PROGRESS NOTES
Name: Lisa Padilla      : 1970      MRN: [id number]  Encounter Provider: Bao Cline MD  Encounter Date: 2025   Encounter department: [facility]    :  Assessment & Plan  Well adult exam         Iron deficiency    Orders:    Iron Panel (Includes Ferritin, Iron Sat%, Iron, and TIBC)    Vitamin D deficiency    Orders:    Vitamin D 25 hydroxy    B12 deficiency    Orders:    Vitamin B12    Discoid lupus erythematosus    Orders:    Iron Panel (Includes Ferritin, Iron Sat%, Iron, and TIBC)    TSH, 3rd generation with Free T4 reflex    Vitamin D 25 hydroxy    Vitamin B12    Erythema multiforme         Thyroid nodule           Assessment & Plan  1. Erythema Multiforme.  - The patient has been experiencing severe flare-ups since 2024, which may be exacerbated by medications.  - Physical examination shows scars indicative of past severe flare-ups.  - Advised to discuss with her dermatologist the possibility of discontinuing all medications and supplements for a few months to observe any changes in her condition.  - Continue taking famotidine 20 mg twice a day as it is less likely to affect her skin. Genetic testing for Behcet's disease (HLA-B51) will be conducted.    2. Cutaneous Lupus.  - There is a disagreement between her dermatologist and rheumatologist regarding the diagnosis.  - Previous biopsies have shown varying results, including possible cutaneous lupus.  - Advised to consult a lupus specialist at [place] if her dermatologist strongly believes in a lupus diagnosis.  - Review of records indicates multiple biopsies with differing results, suggesting the need for further evaluation.    3. Colitis.  - Experienced a severe flare-up of colitis, which may have contributed to her skin condition worsening.  - Physical examination and patient history indicate significant redness at the bottom of the esophagus.  - A follow-up EGD is scheduled for the end of 2025 to check for Philippe's  esophagus.  - Continue monitoring symptoms and follow-up with GI specialist as needed.    4. Osteopenia.  - Bone density test showed osteopenia.  - No new treatment is required at this time.  - Continue with current management and follow-up as needed.    5. Thyroid Nodule.  - Previous ultrasound in 2022 showed a stable nodule on the left side of the thyroid, not meeting criteria for biopsy or follow-up.  - No new action is required unless there are changes.  - Continue monitoring for any new symptoms or changes in the nodule.    6. Health Maintenance.  - Cholesterol levels were within normal limits during the last check in 11/2024.  - Iron levels were at the lower end of the normal range in 2023.  - A mammogram is scheduled for 07/2025.  - An order for iron level testing will be placed to reassess current levels.           History of Present Illness     History of Present Illness  The patient presents for evaluation of erythema multiforme, cutaneous lupus, and colitis.    She has been experiencing a severe flare-up of her condition since 11/2024, which she attributes to the initiation of omeprazole treatment following an emergency room visit for colitis. She describes her rashes as bull's-eye in appearance, a pattern that has remained consistent over time. She has been documenting these rashes through photographs for her dermatologist's reference. She has undergone several biopsies, each yielding different results, including findings suggestive of cutaneous lupus and erythema. She reports that her rashes do not resemble typical lupus rashes but are more consistent with erythema multiforme. She has been conducting her own research on these conditions. She reports constant pain in her legs and expresses concern about her upcoming vacation due to her inability to wear anything other than sweatpants. She is currently taking famotidine twice daily and hydroxychloroquine. She has previously taken valacyclovir and  hydroxychloroquine but discontinued them due to persistent symptoms.    Her dermatologist suspected erythema multiforme, while her rheumatologist, Dr. Jacobs, ruled out cutaneous lupus. She was advised to consult a lupus specialist at [place] and undergo genetic testing for Behcet's disease. She plans to get the genetic testing done soon.    She has a history of mouth sores, which were treated with doxycycline and steroids, leading to the development of severe rashes. She recalls being on alprazolam and birth control at that time. She has had a biopsy in the past.    She has a thyroid nodule and is considering whether it needs to be checked. She continues to take iron supplements three times a week.    She has been feeling slightly better recently but notes that her condition has significantly impacted her mental health.     Review of Systems   Constitutional:  Negative for fever and unexpected weight change.   HENT:  Negative for nosebleeds and trouble swallowing.    Eyes:  Negative for visual disturbance.   Respiratory:  Negative for chest tightness and shortness of breath.    Cardiovascular:  Negative for chest pain, palpitations and leg swelling.   Gastrointestinal:  Negative for abdominal pain, constipation, diarrhea and nausea.   Endocrine: Negative for cold intolerance.   Genitourinary:  Negative for dysuria and urgency.   Musculoskeletal:  Negative for joint swelling and myalgias.   Skin:  Negative for rash.   Neurological:  Negative for tremors, seizures and syncope.   Hematological:  Does not bruise/bleed easily.   Psychiatric/Behavioral:  Negative for hallucinations and suicidal ideas.      Objective   /76   Pulse 80   Ht 5' (1.524 m)   Wt 58.1 kg (128 lb)   SpO2 98%   BMI 25.00 kg/m²     Physical Exam  - Skin: Multiple scars observed  Physical Exam  Vitals and nursing note reviewed.   Constitutional:       Appearance: She is well-developed.   HENT:      Head: Normocephalic and atraumatic.       Right Ear: External ear normal.      Left Ear: External ear normal.      Nose: Nose normal.   Eyes:      Conjunctiva/sclera: Conjunctivae normal.      Pupils: Pupils are equal, round, and reactive to light.   Cardiovascular:      Rate and Rhythm: Normal rate and regular rhythm.      Heart sounds: Normal heart sounds. No murmur heard.  Pulmonary:      Effort: Pulmonary effort is normal.      Breath sounds: Normal breath sounds. No wheezing.   Abdominal:      General: Bowel sounds are normal.      Palpations: Abdomen is soft.   Musculoskeletal:         General: No tenderness. Normal range of motion.      Cervical back: Normal range of motion and neck supple.   Lymphadenopathy:      Cervical: No cervical adenopathy.   Skin:     General: Skin is warm and dry.      Capillary Refill: Capillary refill takes less than 2 seconds.   Neurological:      Mental Status: She is alert and oriented to person, place, and time.   Psychiatric:         Behavior: Behavior normal.         Thought Content: Thought content normal.         Judgment: Judgment normal.

## 2025-05-16 ENCOUNTER — ANESTHESIA (OUTPATIENT)
Dept: ANESTHESIOLOGY | Facility: HOSPITAL | Age: 55
End: 2025-05-16

## 2025-05-16 ENCOUNTER — ANESTHESIA EVENT (OUTPATIENT)
Dept: ANESTHESIOLOGY | Facility: HOSPITAL | Age: 55
End: 2025-05-16

## 2025-05-17 ENCOUNTER — APPOINTMENT (OUTPATIENT)
Dept: LAB | Facility: CLINIC | Age: 55
End: 2025-05-17
Payer: COMMERCIAL

## 2025-05-17 ENCOUNTER — APPOINTMENT (OUTPATIENT)
Dept: LAB | Facility: CLINIC | Age: 55
End: 2025-05-17
Attending: PREVENTIVE MEDICINE
Payer: COMMERCIAL

## 2025-05-17 DIAGNOSIS — Z00.8 HEALTH EXAMINATION IN POPULATION SURVEY: ICD-10-CM

## 2025-05-17 DIAGNOSIS — M35.2 BEHCET'S DISEASE (HCC): ICD-10-CM

## 2025-05-17 LAB
25(OH)D3 SERPL-MCNC: 21.8 NG/ML (ref 30–100)
CHOLEST SERPL-MCNC: 179 MG/DL (ref ?–200)
EST. AVERAGE GLUCOSE BLD GHB EST-MCNC: 105 MG/DL
FERRITIN SERPL-MCNC: 16 NG/ML (ref 30–307)
HBA1C MFR BLD: 5.3 %
HDLC SERPL-MCNC: 74 MG/DL
IRON SATN MFR SERPL: 21 % (ref 15–50)
IRON SERPL-MCNC: 70 UG/DL (ref 50–212)
LDLC SERPL CALC-MCNC: 93 MG/DL (ref 0–100)
NONHDLC SERPL-MCNC: 105 MG/DL
TIBC SERPL-MCNC: 338.8 UG/DL (ref 250–450)
TRANSFERRIN SERPL-MCNC: 242 MG/DL (ref 203–362)
TRIGL SERPL-MCNC: 61 MG/DL (ref ?–150)
TSH SERPL DL<=0.05 MIU/L-ACNC: 1.9 UIU/ML (ref 0.45–4.5)
UIBC SERPL-MCNC: 269 UG/DL (ref 155–355)
VIT B12 SERPL-MCNC: 921 PG/ML (ref 180–914)

## 2025-05-17 PROCEDURE — 83036 HEMOGLOBIN GLYCOSYLATED A1C: CPT

## 2025-05-17 PROCEDURE — 82728 ASSAY OF FERRITIN: CPT | Performed by: FAMILY MEDICINE

## 2025-05-17 PROCEDURE — 80061 LIPID PANEL: CPT

## 2025-05-17 PROCEDURE — 83550 IRON BINDING TEST: CPT | Performed by: FAMILY MEDICINE

## 2025-05-17 PROCEDURE — 36415 COLL VENOUS BLD VENIPUNCTURE: CPT | Performed by: FAMILY MEDICINE

## 2025-05-17 PROCEDURE — 84443 ASSAY THYROID STIM HORMONE: CPT | Performed by: FAMILY MEDICINE

## 2025-05-17 PROCEDURE — 82607 VITAMIN B-12: CPT | Performed by: FAMILY MEDICINE

## 2025-05-17 PROCEDURE — 83540 ASSAY OF IRON: CPT | Performed by: FAMILY MEDICINE

## 2025-05-17 PROCEDURE — 82306 VITAMIN D 25 HYDROXY: CPT | Performed by: FAMILY MEDICINE

## 2025-05-19 ENCOUNTER — OFFICE VISIT (OUTPATIENT)
Dept: OBGYN CLINIC | Facility: CLINIC | Age: 55
End: 2025-05-19
Payer: COMMERCIAL

## 2025-05-19 ENCOUNTER — RESULTS FOLLOW-UP (OUTPATIENT)
Dept: FAMILY MEDICINE CLINIC | Facility: CLINIC | Age: 55
End: 2025-05-19

## 2025-05-19 VITALS
WEIGHT: 128.6 LBS | DIASTOLIC BLOOD PRESSURE: 68 MMHG | SYSTOLIC BLOOD PRESSURE: 108 MMHG | BODY MASS INDEX: 25.25 KG/M2 | HEIGHT: 60 IN

## 2025-05-19 DIAGNOSIS — Z80.3 FAMILY HISTORY OF BREAST CANCER IN MOTHER: ICD-10-CM

## 2025-05-19 DIAGNOSIS — Z12.31 ENCOUNTER FOR SCREENING MAMMOGRAM FOR MALIGNANT NEOPLASM OF BREAST: ICD-10-CM

## 2025-05-19 DIAGNOSIS — Z01.419 WELL WOMAN EXAM WITH ROUTINE GYNECOLOGICAL EXAM: Primary | ICD-10-CM

## 2025-05-19 DIAGNOSIS — E53.8 B12 DEFICIENCY: ICD-10-CM

## 2025-05-19 DIAGNOSIS — N93.9 ABNORMAL UTERINE BLEEDING: ICD-10-CM

## 2025-05-19 DIAGNOSIS — E61.1 IRON DEFICIENCY: Primary | ICD-10-CM

## 2025-05-19 PROCEDURE — S0612 ANNUAL GYNECOLOGICAL EXAMINA: HCPCS | Performed by: OBSTETRICS & GYNECOLOGY

## 2025-05-19 RX ORDER — SODIUM CHLORIDE 9 MG/ML
20 INJECTION, SOLUTION INTRAVENOUS ONCE
Status: CANCELLED | OUTPATIENT
Start: 2025-05-19

## 2025-05-19 RX ORDER — SODIUM CHLORIDE 9 MG/ML
20 INJECTION, SOLUTION INTRAVENOUS ONCE
OUTPATIENT
Start: 2025-05-19

## 2025-05-19 RX ORDER — CYANOCOBALAMIN 1000 UG/ML
1000 INJECTION, SOLUTION INTRAMUSCULAR; SUBCUTANEOUS ONCE
OUTPATIENT
Start: 2025-05-19 | End: 2025-05-19

## 2025-05-19 RX ORDER — CYANOCOBALAMIN 1000 UG/ML
1000 INJECTION, SOLUTION INTRAMUSCULAR; SUBCUTANEOUS ONCE
Status: CANCELLED | OUTPATIENT
Start: 2025-05-19 | End: 2025-05-19

## 2025-05-19 NOTE — PROGRESS NOTES
ASSESSMENT & PLAN:   Diagnoses and all orders for this visit:    Well woman exam with routine gynecological exam  -     Mammo screening bilateral w 3d and cad; Future    Encounter for screening mammogram for malignant neoplasm of breast  -     Mammo screening bilateral w 3d and cad; Future  -     US breast screening bilateral complete (ABUS); Future    Abnormal uterine bleeding  -     US pelvis complete w transvaginal; Future    Family history of breast cancer in mother  -     US breast screening bilateral complete (ABUS); Future    - return for EMB      The following were reviewed in today's visit: ASCCP guidelines, breast self exam, mammography screening ordered, menopause, exercise, and healthy diet.    Patient to return to office in yearly for annual exam.     All questions have been answered to her satisfaction.        CC:  Annual Gynecologic Examination  Chief Complaint   Patient presents with    Gynecologic Exam     Patient presents for her yearly exam.  24 pap nilm  Mammo 7/10/24-scheduled 25  Colonoscopy 25         HPI: Lisa Padilla is a 55 y.o.  who presents for annual gynecologic examination.  She has the following concerns:  periods have been irreg infreq and heavier, some spotting      Health Maintenance:    Exercise: intermittently  Breast exams/breast awareness: yes  Last mammogram:   Colorectal cancer screenin    Past Medical History:   Diagnosis Date    Anxiety     Colitis     Disease of thyroid gland     Erythema multiforme     GERD (gastroesophageal reflux disease)     Hx of basal cell carcinoma 2023    Hx of oral aphthous ulcers     Lupus     Migraine headache     Osteoarthritis     Thyroid nodule     Ulcerative colitis (HCC)     Undifferentiated connective tissue disease (HCC)        Past Surgical History:   Procedure Laterality Date    EGD AND COLONOSCOPY N/A 10/04/2018    Procedure: EGD AND COLONOSCOPY;  Surgeon: Heidy Bernard MD;  Location[facility];   Service: Gastroenterology    FACIAL COSMETIC SURGERY Right     mole surgery for cancer    THYROIDECTOMY Right        Past OB/Gyn History:   Patient's last menstrual period was 02/09/2025 (exact date).    Menopausal status: perimenopausal  Menopausal symptoms: None    Last Pap: 2024 : no abnormalities  History of abnormal Pap smear: no    Patient is not currently sexually active.   STD testing: no  Current contraception: none      Family History  Family History   Problem Relation Age of Onset    Breast cancer Mother 70    Coronary artery disease Mother     Heart disease Mother     Non Hodgkin's lymphoma (HCC) Father     Diabetes Maternal Grandmother     Lymphoma Maternal Grandmother     No Known Problems Maternal Grandfather     Dementia Paternal Grandmother     Natural death of unknown etiology Paternal Grandfather     Basal cell carcinoma Brother     Basal cell carcinoma Brother     No Known Problems Brother     No Known Problems Brother     No Known Problems Son     Heart disease Maternal Aunt     No Known Problems Maternal Aunt     No Known Problems Paternal Aunt     No Known Problems Paternal Uncle     Heart attack Maternal Uncle        Family history of uterine or ovarian cancer: no  Family history of breast cancer: yes  Family history of colon cancer: no    Social History:  Social History     Socioeconomic History    Marital status: /Civil Union     Spouse name: Not on file    Number of children: Not on file    Years of education: Not on file    Highest education level: Not on file   Occupational History    Not on file   Tobacco Use    Smoking status: Never    Smokeless tobacco: Never   Vaping Use    Vaping status: Never Used   Substance and Sexual Activity    Alcohol use: Yes     Comment: occassional     Drug use: No    Sexual activity: Yes     Partners: Male     Birth control/protection: Condom Male   Other Topics Concern    Not on file   Social History Narrative    Not on file     Social Drivers of  Health     Financial Resource Strain: Not on file   Food Insecurity: Not on file   Transportation Needs: Not on file   Physical Activity: Not on file   Stress: Not on file   Social Connections: Not on file   Intimate Partner Violence: Not on file   Housing Stability: Not on file     Domestic violence screen: negative    Allergies:  Allergies[1]    Medications:  Current Medications[2]    Review of Systems:  Review of Systems   Constitutional: Negative.    HENT: Negative.     Respiratory: Negative.     Cardiovascular: Negative.    Gastrointestinal: Negative.    Genitourinary:  Positive for menstrual problem. Negative for pelvic pain, vaginal discharge and vaginal pain.   Neurological: Negative.    Psychiatric/Behavioral: Negative.           Physical Exam:  /68 (BP Location: Right arm, Patient Position: Sitting, Cuff Size: Standard)   Ht 5' (1.524 m)   Wt 58.3 kg (128 lb 9.6 oz)   LMP 02/09/2025 (Exact Date)   Breastfeeding No   BMI 25.12 kg/m²    Physical Exam  Constitutional:       Appearance: Normal appearance.   Genitourinary:      Bladder and urethral meatus normal.      No lesions in the vagina.      Right Labia: No rash, tenderness, lesions, skin changes or Bartholin's cyst.     Left Labia: No tenderness, lesions, skin changes, Bartholin's cyst or rash.     No vaginal erythema, tenderness or bleeding.        Right Adnexa: not tender, not full and no mass present.     Left Adnexa: not tender, not full and no mass present.     Cervix is parous.      No cervical motion tenderness, discharge, lesion or polyp.      Uterus is enlarged (8 week).      Uterus is not fixed or tender.      No uterine mass detected.     Urethral meatus caruncle not present.     No urethral tenderness or mass present.   Breasts:     Right: No swelling, bleeding, inverted nipple, mass, nipple discharge, skin change or tenderness.      Left: No swelling, bleeding, inverted nipple, mass, nipple discharge, skin change or tenderness.    HENT:      Head: Normocephalic and atraumatic.     Eyes:      Extraocular Movements: Extraocular movements intact.      Conjunctiva/sclera: Conjunctivae normal.      Pupils: Pupils are equal, round, and reactive to light.       Cardiovascular:      Rate and Rhythm: Normal rate and regular rhythm.      Heart sounds: Normal heart sounds. No murmur heard.  Pulmonary:      Effort: Pulmonary effort is normal. No respiratory distress.      Breath sounds: Normal breath sounds. No wheezing or rales.   Abdominal:      General: There is no distension.      Palpations: Abdomen is soft.      Tenderness: There is no abdominal tenderness. There is no guarding.     Neurological:      General: No focal deficit present.      Mental Status: She is alert and oriented to person, place, and time.     Skin:     General: Skin is warm and dry.     Psychiatric:         Mood and Affect: Mood normal.         Behavior: Behavior normal.   Vitals and nursing note reviewed.                                    [1]   Allergies  Allergen Reactions    Doxycycline Rash    Keflex [Cephalexin] Rash   [2]   Current Outpatient Medications:     ALPRAZolam (XANAX) 0.25 mg tablet, TAKE 1 TABLET(0.25 MG) BY MOUTH DAILY AT BEDTIME AS NEEDED FOR ANXIETY, Disp: 25 tablet, Rfl: 0    Cholecalciferol (VITAMIN D3 PO), Take by mouth, Disp: , Rfl:     clobetasol (TEMOVATE) 0.05 % ointment, , Disp: , Rfl:     Cyanocobalamin (VITAMIN B12 PO), Take by mouth, Disp: , Rfl:     dexamethasone 0.5 MG/5ML elixir, Rinse with 1 tspful for 2 mins 2-4 times / day then spit out x 5-7 days, Disp: 237 mL, Rfl: 0    famotidine (PEPCID) 40 MG tablet, Take 1 tablet (40 mg total) by mouth daily, Disp: 90 tablet, Rfl: 0    ferrous sulfate 325 (65 FE) MG EC tablet, Take 325 mg by mouth, Disp: , Rfl:     hydroxychloroquine (PLAQUENIL) 200 mg tablet, , Disp: , Rfl:     valACYclovir (VALTREX) 500 mg tablet, Use one tablet twice daily with outbreaks. Start at sx onset., Disp: 60 tablet, Rfl:  6    omeprazole (PriLOSEC) 40 MG capsule, Take 1 capsule (40 mg total) by mouth 2 (two) times a day before meals (Patient taking differently: Take 40 mg by mouth in the morning.), Disp: 180 capsule, Rfl: 1

## 2025-05-21 DIAGNOSIS — K12.0 APHTHOUS ULCER: ICD-10-CM

## 2025-05-21 RX ORDER — DEXAMETHASONE 0.5 MG/5ML
ELIXIR ORAL
Qty: 237 ML | Refills: 0 | Status: SHIPPED | OUTPATIENT
Start: 2025-05-21

## 2025-05-22 LAB
HLA-B: NORMAL
HLA-B: NORMAL
REF LAB TEST METHOD: NORMAL

## 2025-05-24 ENCOUNTER — HOSPITAL ENCOUNTER (OUTPATIENT)
Dept: ULTRASOUND IMAGING | Facility: HOSPITAL | Age: 55
Discharge: HOME | End: 2025-05-24
Attending: OBSTETRICS & GYNECOLOGY
Payer: COMMERCIAL

## 2025-05-24 DIAGNOSIS — N93.9 ABNORMAL UTERINE BLEEDING: ICD-10-CM

## 2025-05-24 PROCEDURE — 76830 TRANSVAGINAL US NON-OB: CPT

## 2025-05-24 PROCEDURE — 76856 US EXAM PELVIC COMPLETE: CPT

## 2025-05-27 NOTE — TELEPHONE ENCOUNTER
Patient calling for results, advised per Dr. Kendrick Notify the patient that she did not have the genetic factor usually seen in patients with Behcet's disease. While that does not preclude the diagnosis of Behcet's, having that genetic factor is the predominant risk factor for the disease. As I said in my prior note, she should discuss evaluation at [place] with Dr. Shiela Ocampo with Dr. Mixon at [place].     No further questions.

## 2025-05-30 ENCOUNTER — ANESTHESIA EVENT (OUTPATIENT)
Dept: GASTROENTEROLOGY | Facility: AMBULARY SURGERY CENTER | Age: 55
End: 2025-05-30
Payer: COMMERCIAL

## 2025-05-30 ENCOUNTER — HOSPITAL ENCOUNTER (OUTPATIENT)
Dept: GASTROENTEROLOGY | Facility: AMBULARY SURGERY CENTER | Age: 55
Setting detail: OUTPATIENT SURGERY
Discharge: HOME | End: 2025-05-30
Attending: INTERNAL MEDICINE
Payer: COMMERCIAL

## 2025-05-30 VITALS
HEIGHT: 60 IN | TEMPERATURE: 97.6 F | HEART RATE: 55 BPM | OXYGEN SATURATION: 98 % | SYSTOLIC BLOOD PRESSURE: 130 MMHG | BODY MASS INDEX: 24.54 KG/M2 | DIASTOLIC BLOOD PRESSURE: 72 MMHG | WEIGHT: 125 LBS | RESPIRATION RATE: 18 BRPM

## 2025-05-30 DIAGNOSIS — K22.70 BARRETT'S ESOPHAGUS WITHOUT DYSPLASIA: ICD-10-CM

## 2025-05-30 DIAGNOSIS — C80.1 CANCER (HCC): Primary | ICD-10-CM

## 2025-05-30 PROBLEM — S03.00XA TMJ (DISLOCATION OF TEMPOROMANDIBULAR JOINT): Status: ACTIVE | Noted: 2025-05-30

## 2025-05-30 PROCEDURE — 43239 EGD BIOPSY SINGLE/MULTIPLE: CPT | Performed by: INTERNAL MEDICINE

## 2025-05-30 PROCEDURE — 88305 TISSUE EXAM BY PATHOLOGIST: CPT | Performed by: STUDENT IN AN ORGANIZED HEALTH CARE EDUCATION/TRAINING PROGRAM

## 2025-05-30 RX ORDER — PREDNISONE 10 MG/1
10 TABLET ORAL DAILY
COMMUNITY

## 2025-05-30 RX ORDER — SODIUM CHLORIDE, SODIUM LACTATE, POTASSIUM CHLORIDE, CALCIUM CHLORIDE 600; 310; 30; 20 MG/100ML; MG/100ML; MG/100ML; MG/100ML
INJECTION, SOLUTION INTRAVENOUS CONTINUOUS PRN
Status: DISCONTINUED | OUTPATIENT
Start: 2025-05-30 | End: 2025-05-30

## 2025-05-30 RX ORDER — LIDOCAINE HYDROCHLORIDE 10 MG/ML
INJECTION, SOLUTION EPIDURAL; INFILTRATION; INTRACAUDAL; PERINEURAL AS NEEDED
Status: DISCONTINUED | OUTPATIENT
Start: 2025-05-30 | End: 2025-05-30

## 2025-05-30 RX ORDER — PROPOFOL 10 MG/ML
INJECTION, EMULSION INTRAVENOUS AS NEEDED
Status: DISCONTINUED | OUTPATIENT
Start: 2025-05-30 | End: 2025-05-30

## 2025-05-30 RX ADMIN — SODIUM CHLORIDE, SODIUM LACTATE, POTASSIUM CHLORIDE, AND CALCIUM CHLORIDE: .6; .31; .03; .02 INJECTION, SOLUTION INTRAVENOUS at 07:33

## 2025-05-30 RX ADMIN — LIDOCAINE HYDROCHLORIDE 100 MG: 10 INJECTION, SOLUTION EPIDURAL; INFILTRATION; INTRACAUDAL at 07:35

## 2025-05-30 RX ADMIN — PROPOFOL 150 MG: 10 INJECTION, EMULSION INTRAVENOUS at 07:35

## 2025-05-30 NOTE — ANESTHESIA POSTPROCEDURE EVALUATION
Post-Op Assessment Note    CV Status:  Stable  Pain Score: 0    Pain management: adequate       Mental Status:  Alert and awake   Hydration Status:  Stable   PONV Controlled:  None   Airway Patency:  Patent     Post Op Vitals Reviewed: Yes    No anethesia notable event occurred.    Staff: CRNA           Last Filed PACU Vitals:  Vitals Value Taken Time   Temp 97.6 °F (36.4 °C) 05/30/25 07:46   Pulse 49 05/30/25 07:46   BP 99/56 05/30/25 07:46   Resp 16 05/30/25 07:46   SpO2 95 % 05/30/25 07:46       Modified Rommel:     Vitals Value Taken Time   Activity 0 05/30/25 07:46   Respiration 2 05/30/25 07:46   Circulation 2 05/30/25 07:46   Consciousness 0 05/30/25 07:46   Oxygen Saturation 2 05/30/25 07:46     Modified Rommel Score: 6

## 2025-05-30 NOTE — H&P
History and Physical -  [facility]  Lisa Padilla 55 y.o. female MRN: [id number]    HPI: Lisa Padilla is a 55 y.o. year old female who present location of Philippe's esophagus.      Review of Systems    Historical Information   Past Medical History[1]  Past Surgical History[2]  Social History   Social History     Substance and Sexual Activity   Alcohol Use Yes    Comment: 1 per month     Social History     Substance and Sexual Activity   Drug Use No     Tobacco Use History[3]  Family History[4]    Meds/Allergies     Not in a hospital admission.    Allergies[5]    Objective     /71   Pulse 63   Temp (!) 97.4 °F (36.3 °C) (Temporal)   Resp 18   Ht 5' (1.524 m)   Wt 56.7 kg (125 lb)   LMP 02/09/2025 (Exact Date)   SpO2 99%   BMI 24.41 kg/m²       PHYSICAL EXAM    Gen: NAD  CV: RRR  CHEST: Clear  ABD: soft, NT/ND  EXT: no edema  Neuro: AAO      ASSESSMENT/PLAN:  This is a 55 y.o. year old female here for evaluation of Philippe's esophagus.    PLAN:   Procedure: EGD.           [1]   Past Medical History:  Diagnosis Date    Anxiety     Cancer (HCC)     skin basal cell    Colitis     Disease of thyroid gland     Erythema multiforme     GERD (gastroesophageal reflux disease)     Hx of basal cell carcinoma 06/22/2023    Hx of oral aphthous ulcers     Lupus     Migraine headache     Osteoarthritis     Thyroid nodule     Ulcerative colitis (HCC)     Undifferentiated connective tissue disease (HCC)    [2]   Past Surgical History:  Procedure Laterality Date    EGD AND COLONOSCOPY N/A 10/04/2018    Procedure: EGD AND COLONOSCOPY;  Surgeon: Heidy Bernard MD;  Location: [facility];  Service: Gastroenterology    FACIAL COSMETIC SURGERY Right     mole surgery for cancer    THYROIDECTOMY Right    [3]   Social History  Tobacco Use   Smoking Status Never   Smokeless Tobacco Never   [4]   Family History  Problem Relation Name Age of Onset    Breast cancer Mother Virginia 70    Coronary artery disease  Mother Virginia     Heart disease Mother Virginia     Non Hodgkin's lymphoma (HCC) Father Willem Linares     Diabetes Maternal Grandmother Margdorita     Lymphoma Maternal Grandmother Margdorita     No Known Problems Maternal Grandfather      Dementia Paternal Grandmother      Natural death of unknown etiology Paternal Grandfather      Basal cell carcinoma Brother      Basal cell carcinoma Brother      No Known Problems Brother      No Known Problems Brother      No Known Problems Son      Heart disease Maternal Aunt Priti     No Known Problems Maternal Aunt Shiela     No Known Problems Paternal Aunt      No Known Problems Paternal Uncle      Heart attack Maternal Uncle     [5]   Allergies  Allergen Reactions    Doxycycline Rash    Keflex [Cephalexin] Rash

## 2025-05-30 NOTE — ANESTHESIA PREPROCEDURE EVALUATION
Procedure:  EGD    Relevant Problems   ANESTHESIA (within normal limits)      CARDIO   (+) Migraine without status migrainosus, not intractable   (-) Angina at rest (HCC)   (-) Angina of effort (HCC)   (-) DEL TORO (dyspnea on exertion)      ENDO (within normal limits)      GI/HEPATIC   (+) Gastroesophageal reflux disease without esophagitis      /RENAL (within normal limits)      HEMATOLOGY (within normal limits)      MUSCULOSKELETAL   (+) TMJ (dislocation of temporomandibular joint)      NEURO/PSYCH   (+) Anxiety   (+) Migraine without status migrainosus, not intractable      Rheumatology   (+) Cutaneous lupus erythematosus        Physical Exam    Airway     Mallampati score: II  TM Distance: >3 FB  Neck ROM: full  Mouth opening: < 4 cm      Cardiovascular  Rhythm: regular, Rate: normal    Dental   No notable dental hx     Pulmonary   Breath sounds clear to auscultation    Neurological    She appears awake, alert and oriented x3.      Other Findings  (+) cutaneous lupus outbreak lower extremities bilaterallypost-pubertal.(+) cutaneous lupus outbreak lower extremities bilaterally        Anesthesia Plan  ASA Score- 2     Anesthesia Type- IV sedation with anesthesia with ASA Monitors.         Additional Monitors:     Airway Plan:            Plan Factors-Exercise tolerance (METS): >4 METS.    Chart reviewed. EKG reviewed.       Patient is not a current smoker.      Obstructive sleep apnea risk education given perioperatively.        Induction- intravenous.    Postoperative Plan- .   Monitoring Plan - Monitoring plan - standard ASA monitoring  Post Operative Pain Plan - non-opiod analgesics    Perioperative Resuscitation Plan - Level 1 - Full Code.       Informed Consent- Anesthetic plan and risks discussed with patient.  I personally reviewed this patient with the CRNA. Discussed and agreed on the Anesthesia Plan with the CRNA..      NPO Status:  Vitals Value Taken Time   Date of last liquid 05/30/25 05/30/25 06:59    Time of last liquid 0001 05/30/25 06:59   Date of last solid 05/30/25 05/30/25 06:59   Time of last solid 0001 05/30/25 06:59

## 2025-06-02 DIAGNOSIS — F41.9 ANXIETY: ICD-10-CM

## 2025-06-02 PROCEDURE — 88305 TISSUE EXAM BY PATHOLOGIST: CPT | Performed by: STUDENT IN AN ORGANIZED HEALTH CARE EDUCATION/TRAINING PROGRAM

## 2025-06-03 ENCOUNTER — RESULTS FOLLOW-UP (OUTPATIENT)
Dept: GASTROENTEROLOGY | Facility: AMBULARY SURGERY CENTER | Age: 55
End: 2025-06-03

## 2025-06-04 ENCOUNTER — OFFICE VISIT (OUTPATIENT)
Dept: URGENT CARE | Facility: CLINIC | Age: 55
End: 2025-06-04
Payer: COMMERCIAL

## 2025-06-04 ENCOUNTER — APPOINTMENT (OUTPATIENT)
Dept: RADIOLOGY | Facility: CLINIC | Age: 55
End: 2025-06-04
Attending: PHYSICIAN ASSISTANT
Payer: COMMERCIAL

## 2025-06-04 VITALS
DIASTOLIC BLOOD PRESSURE: 59 MMHG | BODY MASS INDEX: 24.41 KG/M2 | OXYGEN SATURATION: 98 % | TEMPERATURE: 98.3 F | RESPIRATION RATE: 15 BRPM | WEIGHT: 125 LBS | HEART RATE: 87 BPM | SYSTOLIC BLOOD PRESSURE: 112 MMHG

## 2025-06-04 DIAGNOSIS — S93.602A FOOT SPRAIN, LEFT, INITIAL ENCOUNTER: Primary | ICD-10-CM

## 2025-06-04 DIAGNOSIS — M25.572 ACUTE LEFT ANKLE PAIN: ICD-10-CM

## 2025-06-04 PROCEDURE — 99213 OFFICE O/P EST LOW 20 MIN: CPT | Performed by: PHYSICIAN ASSISTANT

## 2025-06-04 PROCEDURE — 73610 X-RAY EXAM OF ANKLE: CPT

## 2025-06-04 PROCEDURE — 73630 X-RAY EXAM OF FOOT: CPT

## 2025-06-04 RX ORDER — ALPRAZOLAM 0.25 MG
0.25 TABLET ORAL
Qty: 6 TABLET | Refills: 0 | Status: SHIPPED | OUTPATIENT
Start: 2025-06-04

## 2025-06-04 NOTE — TELEPHONE ENCOUNTER
EDITH MP Last refill 07/02/2024 # 25   Can you refill on behalf of Dr Cline he returns  06/16/2025

## 2025-06-04 NOTE — PATIENT INSTRUCTIONS
Wear post op shoe until able to transition into Birkenstocks  Wear compression sleeve  Ice and elevate  Use Ibuprofen 2 pills 3 x day with food - stop if any GI upset develops   Follow up with PCP in 3-5 days.  Proceed to  ER if symptoms worsen.    If tests have been performed at [facility], our office will contact you with results if changes need to be made to the care plan discussed with you at the visit.  You can review your full results on [facility] MyChart.

## 2025-06-04 NOTE — PROGRESS NOTES
[facility]        NAME: Lisa Padilla is a 55 y.o. female  : 1970    MRN: [id number]  DATE: 2025  TIME: 6:26 PM    Assessment and Plan   Foot sprain, left, initial encounter [S93.602A]  1. Foot sprain, left, initial encounter  Durable Medical Equipment      2. Acute left ankle pain  XR foot 3+ vw left    XR ankle 3+ vw left            Patient Instructions   Xrays of the foot and ankle do not show a fracture.  Wear post op shoe until able to transition into Birkenstocks  Wear compression sleeve  Ice and elevate  Use Ibuprofen 2 pills 3 x day with food - stop if any GI upset develops   Follow up with PCP in 3-5 days.  Proceed to  ER if symptoms worsen.    If tests have been performed at [facility], our office will contact you with results if changes need to be made to the care plan discussed with you at the visit.  [facility]    Chief Complaint     Chief Complaint   Patient presents with    Fall     Pt fell down the steps yesterday and she landed on her left foot awkward she wants to make sure it is not broken          History of Present Illness       HPI  56 y/o female presents for evaluation of left foot pain.  She states yesterday evening she was hurrying down the stairs wearing slippers and carrying items in both hands when she slipped and fell down 3-4 steps.  She does not recall how she landed or how the ankle/foot twisted but did have pain immediately.  She has applied ice, elevated the foot and taken Advil.  She denies previous left foot injuries.   Review of Systems   Review of Systems   Constitutional:  Negative for chills and fever.   HENT:  Negative for ear pain and sore throat.    Eyes:  Negative for pain and visual disturbance.   Respiratory:  Negative for cough and shortness of breath.    Cardiovascular:  Negative for chest pain and palpitations.   Gastrointestinal:  Negative for abdominal pain and vomiting.   Genitourinary:  Negative  for dysuria and hematuria.   Musculoskeletal:  Positive for arthralgias, gait problem and myalgias. Negative for back pain.   Skin:  Negative for color change and rash.   Neurological:  Negative for seizures and syncope.   All other systems reviewed and are negative.        Current Medications     Current Medications[1]    Current Allergies     Allergies as of 06/04/2025 - Reviewed 06/04/2025   Allergen Reaction Noted    Doxycycline Rash 07/23/2021    Keflex [cephalexin] Rash 06/19/2017            The following portions of the patient's history were reviewed and updated as appropriate: allergies, current medications, past family history, past medical history, past social history, past surgical history and problem list.     Past Medical History[2]    Past Surgical History[3]    Family History[4]      Medications have been verified.        Objective   /59   Pulse 87   Temp 98.3 °F (36.8 °C)   Resp 15   Wt 56.7 kg (125 lb)   LMP 02/09/2025 (Exact Date)   SpO2 98%   BMI 24.41 kg/m²   Patient's last menstrual period was 02/09/2025 (exact date).       Physical Exam     Physical Exam  Vitals and nursing note reviewed.   Constitutional:       General: She is not in acute distress.     Appearance: Normal appearance.   HENT:      Head: Normocephalic and atraumatic.     Musculoskeletal:      Comments: Left lower leg:  Non-tender over tibia/fibula  No calf tenderness, neg Rommel's  No tenderness over anterior syndesmosis\    Left ankle: non-tender over medial/lateral malleolus, deltoid, ATFL/CFL/PTFL  Mild discomfort with ankle DF/PF although ROM wnl  Pain with inversion  Left foot:  ecchymosis with to moderate edema over the lateral mid-foot  Tender over the lateral cuneiform and cuboid  Non-tender over metatarsals  Able to move all 5 toes without pain  2+ DP pulse  Brisk capillary refill  Sensation intact in the foot and toes      Skin:     General: Skin is warm and dry.     Neurological:      Mental Status: She  is alert and oriented to person, place, and time.     Psychiatric:         Mood and Affect: Mood normal.         Behavior: Behavior normal.       3 view left ankle xray:  Preliminary reading: no acute fracture, mortise intact. Await final reading    3 view left foot xray:  Preliminary reading: no acute fracture, await final reading.                   [1]   Current Outpatient Medications:     ALPRAZolam (XANAX) 0.25 mg tablet, Take 1 tablet (0.25 mg total) by mouth daily at bedtime as needed for anxiety, Disp: 6 tablet, Rfl: 0    Cholecalciferol (VITAMIN D3 PO), Take by mouth, Disp: , Rfl:     clobetasol (TEMOVATE) 0.05 % ointment, , Disp: , Rfl:     Cyanocobalamin (VITAMIN B12 PO), Take by mouth, Disp: , Rfl:     dexamethasone 0.5 MG/5ML elixir, Rinse with 1 tspful for 2 mins 2-4 times / day then spit out x 5-7 days, Disp: 237 mL, Rfl: 0    famotidine (PEPCID) 40 MG tablet, Take 1 tablet (40 mg total) by mouth daily, Disp: 90 tablet, Rfl: 0    ferrous sulfate 325 (65 FE) MG EC tablet, Take 325 mg by mouth, Disp: , Rfl:     hydroxychloroquine (PLAQUENIL) 200 mg tablet, , Disp: , Rfl:     predniSONE 10 mg tablet, Take 10 mg by mouth daily, Disp: , Rfl:     valACYclovir (VALTREX) 500 mg tablet, Use one tablet twice daily with outbreaks. Start at sx onset., Disp: 60 tablet, Rfl: 6  [2]   Past Medical History:  Diagnosis Date    Anxiety     Cancer (HCC)     skin basal cell    Colitis     Disease of thyroid gland     Erythema multiforme     GERD (gastroesophageal reflux disease)     Hx of basal cell carcinoma 06/22/2023    Hx of oral aphthous ulcers     Lupus     Migraine headache     Osteoarthritis     Thyroid nodule     Ulcerative colitis (HCC)     Undifferentiated connective tissue disease (HCC)    [3]   Past Surgical History:  Procedure Laterality Date    EGD AND COLONOSCOPY N/A 10/04/2018    Procedure: EGD AND COLONOSCOPY;  Surgeon: Heidy Bernard MD;  Location: [facility];  Service: Gastroenterology    FACIAL  COSMETIC SURGERY Right     mole surgery for cancer    THYROIDECTOMY Right    [4]   Family History  Problem Relation Name Age of Onset    Breast cancer Mother Virginia 70    Coronary artery disease Mother [place]     Heart disease Mother [place]     Non Hodgkin's lymphoma (HCC) Father Willem Linares     Diabetes Maternal Grandmother Margdorita     Lymphoma Maternal Grandmother Margdorita     No Known Problems Maternal Grandfather      Dementia Paternal Grandmother      Natural death of unknown etiology Paternal Grandfather      Basal cell carcinoma Brother      Basal cell carcinoma Brother      No Known Problems Brother      No Known Problems Brother      No Known Problems Son      Heart disease Maternal Aunt Priti     No Known Problems Maternal Aunt Shiela     No Known Problems Paternal Aunt      No Known Problems Paternal Uncle      Heart attack Maternal Uncle

## 2025-06-05 ENCOUNTER — RESULTS FOLLOW-UP (OUTPATIENT)
Dept: URGENT CARE | Facility: CLINIC | Age: 55
End: 2025-06-05

## 2025-06-11 NOTE — TELEPHONE ENCOUNTER
Attempted to call pt regarding EGD results & provider recommendations, however, I received a message stating my call could not be completed at this time. Unable to leave VM. Will try again. 1 year EGD recall set.    Please, relay results if pt returns call. Thanks!

## 2025-06-11 NOTE — TELEPHONE ENCOUNTER
----- Message from Kyle Self MD sent at 6/10/2025  6:55 AM EDT -----  Repeat EGD in 1 year given irregular Z-line inflammation in the bottom of esophagus.  ----- Message -----  From: Lab, Background User  Sent: 6/2/2025  10:59 AM EDT  To: Kyle Self MD

## 2025-06-13 NOTE — TELEPHONE ENCOUNTER
Attempted to call pt regarding EGD results & provider recommendations, however, I received voicemail. Advised to return call to office to discuss. Recall set. Second attempt letter sent.    Please, relay results if pt returns call. Thanks!

## 2025-06-16 ENCOUNTER — HOSPITAL ENCOUNTER (OUTPATIENT)
Dept: INFUSION CENTER | Facility: CLINIC | Age: 55
Discharge: HOME | End: 2025-06-16
Attending: FAMILY MEDICINE

## 2025-06-17 ENCOUNTER — HOSPITAL ENCOUNTER (OUTPATIENT)
Dept: INFUSION CENTER | Facility: CLINIC | Age: 55
Discharge: HOME | End: 2025-06-17
Attending: FAMILY MEDICINE
Payer: COMMERCIAL

## 2025-06-17 VITALS
HEART RATE: 69 BPM | TEMPERATURE: 97.1 F | SYSTOLIC BLOOD PRESSURE: 119 MMHG | OXYGEN SATURATION: 97 % | DIASTOLIC BLOOD PRESSURE: 85 MMHG | RESPIRATION RATE: 16 BRPM

## 2025-06-17 DIAGNOSIS — E53.8 B12 DEFICIENCY: ICD-10-CM

## 2025-06-17 DIAGNOSIS — E61.1 IRON DEFICIENCY: Primary | ICD-10-CM

## 2025-06-17 PROCEDURE — 96365 THER/PROPH/DIAG IV INF INIT: CPT

## 2025-06-17 PROCEDURE — 96372 THER/PROPH/DIAG INJ SC/IM: CPT

## 2025-06-17 RX ORDER — CYANOCOBALAMIN 1000 UG/ML
1000 INJECTION, SOLUTION INTRAMUSCULAR; SUBCUTANEOUS ONCE
Status: COMPLETED | OUTPATIENT
Start: 2025-06-17 | End: 2025-06-17

## 2025-06-17 RX ORDER — SODIUM CHLORIDE 9 MG/ML
20 INJECTION, SOLUTION INTRAVENOUS ONCE
Status: CANCELLED | OUTPATIENT
Start: 2025-06-25

## 2025-06-17 RX ORDER — SODIUM CHLORIDE 9 MG/ML
20 INJECTION, SOLUTION INTRAVENOUS ONCE
Status: COMPLETED | OUTPATIENT
Start: 2025-06-17 | End: 2025-06-17

## 2025-06-17 RX ORDER — CYANOCOBALAMIN 1000 UG/ML
1000 INJECTION, SOLUTION INTRAMUSCULAR; SUBCUTANEOUS ONCE
Status: CANCELLED | OUTPATIENT
Start: 2025-06-25 | End: 2025-06-24

## 2025-06-17 RX ADMIN — CYANOCOBALAMIN 1000 MCG: 1000 INJECTION, SOLUTION INTRAMUSCULAR at 12:16

## 2025-06-17 RX ADMIN — SODIUM CHLORIDE 20 ML/HR: 0.9 INJECTION, SOLUTION INTRAVENOUS at 12:13

## 2025-06-17 RX ADMIN — IRON SUCROSE 200 MG: 20 INJECTION, SOLUTION INTRAVENOUS at 12:16

## 2025-06-17 NOTE — PROGRESS NOTES
Patient arrives for first dose Venofer + B12 injection. PIV placed without issue, patient tolerated well. B12 injection administered to LEFT deltoid, bandaid in place. Patient resting on recliner chair, call bell within reach.

## 2025-06-17 NOTE — PROGRESS NOTES
Patient tolerated treatment without issue. PIV removed intact, coban wrap in place. Patient aware of next appt at [facility] on 6/25 at 4PM, declines AVS.

## 2025-06-19 ENCOUNTER — PROCEDURE VISIT (OUTPATIENT)
Dept: OBGYN CLINIC | Facility: CLINIC | Age: 55
End: 2025-06-19
Payer: COMMERCIAL

## 2025-06-19 VITALS
DIASTOLIC BLOOD PRESSURE: 74 MMHG | HEIGHT: 60 IN | BODY MASS INDEX: 24.35 KG/M2 | WEIGHT: 124 LBS | SYSTOLIC BLOOD PRESSURE: 118 MMHG

## 2025-06-19 DIAGNOSIS — N93.9 ABNORMAL UTERINE BLEEDING: Primary | ICD-10-CM

## 2025-06-19 PROBLEM — Z30.40 ENCOUNTER FOR SURVEILLANCE OF CONTRACEPTIVES: Status: RESOLVED | Noted: 2020-12-10 | Resolved: 2025-06-19

## 2025-06-19 LAB — SL AMB POCT URINE HCG: NEGATIVE

## 2025-06-19 PROCEDURE — 88305 TISSUE EXAM BY PATHOLOGIST: CPT | Performed by: PATHOLOGY

## 2025-06-19 PROCEDURE — 81025 URINE PREGNANCY TEST: CPT | Performed by: OBSTETRICS & GYNECOLOGY

## 2025-06-19 PROCEDURE — 58100 BIOPSY OF UTERUS LINING: CPT | Performed by: OBSTETRICS & GYNECOLOGY

## 2025-06-19 PROCEDURE — 88342 IMHCHEM/IMCYTCHM 1ST ANTB: CPT | Performed by: PATHOLOGY

## 2025-06-19 PROCEDURE — 88344 IMHCHEM/IMCYTCHM EA MLT ANTB: CPT | Performed by: PATHOLOGY

## 2025-06-19 NOTE — PROGRESS NOTES
Endometrial biopsy    Date/Time: 6/19/2025 11:00 AM    Performed by: Christi Boyce PA-C  Authorized by: Christi Boyce PA-C    Universal Protocol:  procedure performed by consultantConsent: Verbal consent obtained. Written consent obtained  Risks and benefits: risks, benefits and alternatives were discussed  Consent given by: patient  Patient understanding: patient states understanding of the procedure being performed  Patient consent: the patient's understanding of the procedure matches consent given  Procedure consent: procedure consent matches procedure scheduled  Required items: required blood products, implants, devices, and special equipment available    Indication:     Indications: Other disorder of menstruation and other abnormal bleeding from female genital tract      Indications comment:  AUB  Pre-procedure:     Negative urine pregnancy test: yes    Procedure:     Procedure: endometrial biopsy with Pipelle      A bivalve speculum was placed in the vagina: yes      Cervix cleaned and prepped: yes      Uterus sounded: yes      Specimen collected: specimen collected and sent to pathology

## 2025-06-19 NOTE — PROGRESS NOTES
Endometrial biopsy    Date/Time: 6/19/2025 11:00 AM    Performed by: Shonna Garrett MD  Authorized by: Shonna Garrett MD    Universal Protocol:  Consent: Verbal consent obtained. Written consent obtained  Risks and benefits: risks, benefits and alternatives were discussed  Consent given by: patient  Patient understanding: patient states understanding of the procedure being performed  Patient consent: the patient's understanding of the procedure matches consent given  Required items: required blood products, implants, devices, and special equipment available  Patient identity confirmed: verbally with patient    Indication:     Indications: Other disorder of menstruation and other abnormal bleeding from female genital tract    Pre-procedure:     Negative urine pregnancy test: yes    Procedure:     Procedure: endometrial biopsy with Pipelle      A bivalve speculum was placed in the vagina: yes      Cervix cleaned and prepped: yes      The cervix was dilated: no      Uterus sounded: yes      Uterus sound depth (cm):  7    Curettes used:  2    Specimen collected: specimen collected and sent to pathology      Patient tolerated procedure well with no complications: yes    Findings:     Uterus size:  Non-gravid    Cervix: normal      Adnexa: normal    Comments:     Procedure comments:  Will call pt with results

## 2025-06-25 ENCOUNTER — HOSPITAL ENCOUNTER (OUTPATIENT)
Dept: INFUSION CENTER | Facility: CLINIC | Age: 55
Discharge: HOME | End: 2025-06-25
Attending: FAMILY MEDICINE
Payer: COMMERCIAL

## 2025-06-25 VITALS
RESPIRATION RATE: 18 BRPM | DIASTOLIC BLOOD PRESSURE: 77 MMHG | SYSTOLIC BLOOD PRESSURE: 129 MMHG | TEMPERATURE: 96.3 F | HEART RATE: 66 BPM

## 2025-06-25 DIAGNOSIS — E61.1 IRON DEFICIENCY: Primary | ICD-10-CM

## 2025-06-25 DIAGNOSIS — E53.8 B12 DEFICIENCY: ICD-10-CM

## 2025-06-25 PROCEDURE — 96365 THER/PROPH/DIAG IV INF INIT: CPT

## 2025-06-25 PROCEDURE — 96372 THER/PROPH/DIAG INJ SC/IM: CPT

## 2025-06-25 RX ORDER — SODIUM CHLORIDE 9 MG/ML
20 INJECTION, SOLUTION INTRAVENOUS ONCE
Status: CANCELLED | OUTPATIENT
Start: 2025-07-08

## 2025-06-25 RX ORDER — CYANOCOBALAMIN 1000 UG/ML
1000 INJECTION, SOLUTION INTRAMUSCULAR; SUBCUTANEOUS ONCE
Status: CANCELLED | OUTPATIENT
Start: 2025-07-08 | End: 2025-07-01

## 2025-06-25 RX ORDER — SODIUM CHLORIDE 9 MG/ML
20 INJECTION, SOLUTION INTRAVENOUS ONCE
Status: COMPLETED | OUTPATIENT
Start: 2025-06-25 | End: 2025-06-25

## 2025-06-25 RX ORDER — CYANOCOBALAMIN 1000 UG/ML
1000 INJECTION, SOLUTION INTRAMUSCULAR; SUBCUTANEOUS ONCE
Status: COMPLETED | OUTPATIENT
Start: 2025-06-25 | End: 2025-06-25

## 2025-06-25 RX ADMIN — SODIUM CHLORIDE 20 ML/HR: 0.9 INJECTION, SOLUTION INTRAVENOUS at 16:13

## 2025-06-25 RX ADMIN — CYANOCOBALAMIN 1000 MCG: 1000 INJECTION, SOLUTION INTRAMUSCULAR at 16:12

## 2025-06-25 RX ADMIN — IRON SUCROSE 200 MG: 20 INJECTION, SOLUTION INTRAVENOUS at 16:12

## 2025-06-25 NOTE — PROGRESS NOTES
Patient is here for venofer and B12. She offers no complaints at this time. B 12 given in right arm and she tolerated it well

## 2025-06-26 DIAGNOSIS — K21.9 GASTROESOPHAGEAL REFLUX DISEASE, UNSPECIFIED WHETHER ESOPHAGITIS PRESENT: ICD-10-CM

## 2025-06-26 PROCEDURE — 88344 IMHCHEM/IMCYTCHM EA MLT ANTB: CPT | Performed by: PATHOLOGY

## 2025-06-26 PROCEDURE — 88305 TISSUE EXAM BY PATHOLOGIST: CPT | Performed by: PATHOLOGY

## 2025-06-26 PROCEDURE — 88342 IMHCHEM/IMCYTCHM 1ST ANTB: CPT | Performed by: PATHOLOGY

## 2025-06-26 RX ORDER — FAMOTIDINE 40 MG/1
40 TABLET, FILM COATED ORAL DAILY
Qty: 90 TABLET | Refills: 0 | Status: SHIPPED | OUTPATIENT
Start: 2025-06-26 | End: 2025-09-24

## 2025-07-03 DIAGNOSIS — E53.8 B12 DEFICIENCY: ICD-10-CM

## 2025-07-03 DIAGNOSIS — E61.1 IRON DEFICIENCY: Primary | ICD-10-CM

## 2025-07-03 RX ORDER — CYANOCOBALAMIN 1000 UG/ML
1000 INJECTION, SOLUTION INTRAMUSCULAR; SUBCUTANEOUS ONCE
Status: CANCELLED | OUTPATIENT
Start: 2025-07-08 | End: 2025-07-08

## 2025-07-03 RX ORDER — SODIUM CHLORIDE 9 MG/ML
20 INJECTION, SOLUTION INTRAVENOUS ONCE
Status: CANCELLED | OUTPATIENT
Start: 2025-07-08

## 2025-07-08 ENCOUNTER — HOSPITAL ENCOUNTER (OUTPATIENT)
Dept: INFUSION CENTER | Facility: CLINIC | Age: 55
Discharge: HOME | End: 2025-07-08
Attending: FAMILY MEDICINE
Payer: COMMERCIAL

## 2025-07-08 VITALS
TEMPERATURE: 98.5 F | DIASTOLIC BLOOD PRESSURE: 67 MMHG | HEART RATE: 81 BPM | SYSTOLIC BLOOD PRESSURE: 103 MMHG | OXYGEN SATURATION: 97 % | RESPIRATION RATE: 18 BRPM

## 2025-07-08 DIAGNOSIS — F41.9 ANXIETY: ICD-10-CM

## 2025-07-08 DIAGNOSIS — E53.8 B12 DEFICIENCY: ICD-10-CM

## 2025-07-08 DIAGNOSIS — E61.1 IRON DEFICIENCY: Primary | ICD-10-CM

## 2025-07-08 PROCEDURE — 96372 THER/PROPH/DIAG INJ SC/IM: CPT

## 2025-07-08 PROCEDURE — 96365 THER/PROPH/DIAG IV INF INIT: CPT

## 2025-07-08 RX ORDER — CYANOCOBALAMIN 1000 UG/ML
1000 INJECTION, SOLUTION INTRAMUSCULAR; SUBCUTANEOUS ONCE
Status: CANCELLED | OUTPATIENT
Start: 2025-07-15 | End: 2025-07-15

## 2025-07-08 RX ORDER — SODIUM CHLORIDE 9 MG/ML
20 INJECTION, SOLUTION INTRAVENOUS ONCE
Status: COMPLETED | OUTPATIENT
Start: 2025-07-08 | End: 2025-07-08

## 2025-07-08 RX ORDER — CYANOCOBALAMIN 1000 UG/ML
1000 INJECTION, SOLUTION INTRAMUSCULAR; SUBCUTANEOUS ONCE
Status: COMPLETED | OUTPATIENT
Start: 2025-07-08 | End: 2025-07-08

## 2025-07-08 RX ORDER — ALPRAZOLAM 0.25 MG
0.25 TABLET ORAL
Qty: 30 TABLET | Refills: 2 | Status: SHIPPED | OUTPATIENT
Start: 2025-07-08

## 2025-07-08 RX ORDER — SODIUM CHLORIDE 9 MG/ML
20 INJECTION, SOLUTION INTRAVENOUS ONCE
Status: CANCELLED | OUTPATIENT
Start: 2025-07-15

## 2025-07-08 RX ADMIN — SODIUM CHLORIDE 20 ML/HR: 0.9 INJECTION, SOLUTION INTRAVENOUS at 16:21

## 2025-07-08 RX ADMIN — CYANOCOBALAMIN 1000 MCG: 1000 INJECTION INTRAMUSCULAR; SUBCUTANEOUS at 16:24

## 2025-07-08 RX ADMIN — IRON SUCROSE 200 MG: 20 INJECTION, SOLUTION INTRAVENOUS at 16:23

## 2025-07-08 NOTE — PROGRESS NOTES
Patient tolerated B12/Venofer without issue. PIV removed intact by AL RN, coban wrap in place. Patient aware of next appt at [facility] 7/15 at 4PM, declines AVS.

## 2025-07-11 ENCOUNTER — TELEPHONE (OUTPATIENT)
Age: 55
End: 2025-07-11

## 2025-07-11 ENCOUNTER — HOSPITAL ENCOUNTER (EMERGENCY)
Facility: HOSPITAL | Age: 55
Discharge: HOME | End: 2025-07-11
Attending: EMERGENCY MEDICINE
Payer: COMMERCIAL

## 2025-07-11 VITALS
SYSTOLIC BLOOD PRESSURE: 133 MMHG | RESPIRATION RATE: 20 BRPM | HEART RATE: 97 BPM | OXYGEN SATURATION: 99 % | TEMPERATURE: 98.3 F | DIASTOLIC BLOOD PRESSURE: 82 MMHG

## 2025-07-11 DIAGNOSIS — R25.2 BILATERAL LEG CRAMPS: Primary | ICD-10-CM

## 2025-07-11 LAB
ALBUMIN SERPL BCG-MCNC: 4.3 G/DL (ref 3.5–5)
ALP SERPL-CCNC: 75 U/L (ref 34–104)
ALT SERPL W P-5'-P-CCNC: 15 U/L (ref 7–52)
ANION GAP SERPL CALCULATED.3IONS-SCNC: 7 MMOL/L (ref 4–13)
AST SERPL W P-5'-P-CCNC: 18 U/L (ref 13–39)
BASOPHILS # BLD AUTO: 0.04 THOUSANDS/ÂΜL (ref 0–0.1)
BASOPHILS NFR BLD AUTO: 1 % (ref 0–1)
BILIRUB SERPL-MCNC: 0.47 MG/DL (ref 0.2–1)
BUN SERPL-MCNC: 8 MG/DL (ref 5–25)
CALCIUM SERPL-MCNC: 9.1 MG/DL (ref 8.4–10.2)
CHLORIDE SERPL-SCNC: 106 MMOL/L (ref 96–108)
CK SERPL-CCNC: 82 U/L (ref 26–192)
CO2 SERPL-SCNC: 27 MMOL/L (ref 21–32)
CREAT SERPL-MCNC: 0.65 MG/DL (ref 0.6–1.3)
EOSINOPHIL # BLD AUTO: 0.12 THOUSAND/ÂΜL (ref 0–0.61)
EOSINOPHIL NFR BLD AUTO: 2 % (ref 0–6)
ERYTHROCYTE [DISTWIDTH] IN BLOOD BY AUTOMATED COUNT: 12.5 % (ref 11.6–15.1)
GFR SERPL CREATININE-BSD FRML MDRD: 100 ML/MIN/1.73SQ M
GLUCOSE SERPL-MCNC: 90 MG/DL (ref 65–140)
HCT VFR BLD AUTO: 41.4 % (ref 34.8–46.1)
HGB BLD-MCNC: 14.2 G/DL (ref 11.5–15.4)
IMM GRANULOCYTES # BLD AUTO: 0.01 THOUSAND/UL (ref 0–0.2)
IMM GRANULOCYTES NFR BLD AUTO: 0 % (ref 0–2)
LYMPHOCYTES # BLD AUTO: 2.56 THOUSANDS/ÂΜL (ref 0.6–4.47)
LYMPHOCYTES NFR BLD AUTO: 38 % (ref 14–44)
MAGNESIUM SERPL-MCNC: 2.2 MG/DL (ref 1.9–2.7)
MCH RBC QN AUTO: 31.8 PG (ref 26.8–34.3)
MCHC RBC AUTO-ENTMCNC: 34.3 G/DL (ref 31.4–37.4)
MCV RBC AUTO: 93 FL (ref 82–98)
MONOCYTES # BLD AUTO: 0.51 THOUSAND/ÂΜL (ref 0.17–1.22)
MONOCYTES NFR BLD AUTO: 8 % (ref 4–12)
NEUTROPHILS # BLD AUTO: 3.42 THOUSANDS/ÂΜL (ref 1.85–7.62)
NEUTS SEG NFR BLD AUTO: 51 % (ref 43–75)
NRBC BLD AUTO-RTO: 0 /100 WBCS
PLATELET # BLD AUTO: 203 THOUSANDS/UL (ref 149–390)
PMV BLD AUTO: 9.4 FL (ref 8.9–12.7)
POTASSIUM SERPL-SCNC: 3.7 MMOL/L (ref 3.5–5.3)
PROT SERPL-MCNC: 7.3 G/DL (ref 6.4–8.4)
RBC # BLD AUTO: 4.47 MILLION/UL (ref 3.81–5.12)
SODIUM SERPL-SCNC: 140 MMOL/L (ref 135–147)
WBC # BLD AUTO: 6.66 THOUSAND/UL (ref 4.31–10.16)

## 2025-07-11 PROCEDURE — 82550 ASSAY OF CK (CPK): CPT | Performed by: EMERGENCY MEDICINE

## 2025-07-11 PROCEDURE — 96360 HYDRATION IV INFUSION INIT: CPT

## 2025-07-11 PROCEDURE — 80053 COMPREHEN METABOLIC PANEL: CPT | Performed by: EMERGENCY MEDICINE

## 2025-07-11 PROCEDURE — 36415 COLL VENOUS BLD VENIPUNCTURE: CPT | Performed by: EMERGENCY MEDICINE

## 2025-07-11 PROCEDURE — 99284 EMERGENCY DEPT VISIT MOD MDM: CPT | Performed by: EMERGENCY MEDICINE

## 2025-07-11 PROCEDURE — 99284 EMERGENCY DEPT VISIT MOD MDM: CPT

## 2025-07-11 PROCEDURE — 85025 COMPLETE CBC W/AUTO DIFF WBC: CPT | Performed by: EMERGENCY MEDICINE

## 2025-07-11 PROCEDURE — 83735 ASSAY OF MAGNESIUM: CPT | Performed by: EMERGENCY MEDICINE

## 2025-07-11 RX ORDER — ACETAMINOPHEN 325 MG/1
975 TABLET ORAL ONCE
Status: COMPLETED | OUTPATIENT
Start: 2025-07-11 | End: 2025-07-11

## 2025-07-11 RX ADMIN — SODIUM CHLORIDE 1000 ML: 0.9 INJECTION, SOLUTION INTRAVENOUS at 20:44

## 2025-07-11 RX ADMIN — ACETAMINOPHEN 975 MG: 325 TABLET ORAL at 20:37

## 2025-07-11 NOTE — TELEPHONE ENCOUNTER
Spoke with pt in regards to mychart message. Pt is feeling a bit better today, diarrhea has resolved. Is eating bland diet, had banana and hydrating with water, leg cramps getting better. Advised gatorade/coconut water, can try gas x for gassiness. Advised to monitor, if symptoms get worse go to ER for eval

## 2025-07-11 NOTE — ED PROVIDER NOTES
Time reflects when diagnosis was documented in both MDM as applicable and the Disposition within this note       Time User Action Codes Description Comment    7/11/2025  9:34 PM Hays Ramoshector Add [R25.2] Bilateral leg cramps           ED Disposition       ED Disposition   Discharge    Condition   Stable    Date/Time   Fri Jul 11, 2025  9:33 PM    Comment   Lisa Valerie discharge to home/self care.                   Assessment & Plan       Medical Decision Making  55-year-old female presents with bilateral leg cramps, will be evaluated for possible electrolyte abnormalities given the significant mount of water has been drinking and the relief with Gatorade, also will be evaluated for rhabdo, anemia, or other pathology.  If her workup is unremarkable the patient will be discharged home, and she will be given a fluid bolus.    The patient is feeling much better after fluid ministration and analgesia, her workup is unremarkable, the patient is safe for discharge home.  Return indications given.    Disposition: Patient stable for outpatient management. Discussed need for follow up with their primary doctor or specialist to review all results, including incidental findings as below. Patient discharged with explanation of ED workup and diagnosis, instructions on how to obtain outpatient follow up, care instructions at home, and strict return precautions if patient develops new or worsening symptoms. Patients questions answered and agreeable with discharge plan.        PLEASE NOTE:  This encounter was completed utilizing the [employer] Direct Speech Voice Recognition Software. Grammatical errors, random word insertions, pronoun errors and incomplete sentences are occasional inherent consequences of the system due to software limitations, ambient noise and hardware issues.These may be missed by proof reading prior to affixing electronic signature. Any questions or concerns about the content, text or information  contained within the body of this dictation should be directly addressed to the physician for clarification. Please do not hesitate to call me directly if you have any questions or concerns.      Amount and/or Complexity of Data Reviewed  Labs: ordered.    Risk  OTC drugs.             Medications   sodium chloride 0.9 % bolus 1,000 mL (0 mL Intravenous Stopped 7/11/25 2155)   acetaminophen (TYLENOL) tablet 975 mg (975 mg Oral Given 7/11/25 2037)       ED Risk Strat Scores                    No data recorded                            History of Present Illness       Chief Complaint   Patient presents with    Diarrhea     Pt c/o stomach pains, bloating started Monday night. Diarrhea started Tuesday. Leg cramps since yesterday. -vomiting.     Leg Pain       Past Medical History[1]   Past Surgical History[2]   Family History[3]   Social History[4]   E-Cigarette/Vaping    E-Cigarette Use Never User       E-Cigarette/Vaping Substances    Nicotine No     THC No     CBD No     Flavoring No     Other No     Unknown No       I have reviewed and agree with the history as documented.     55-year-old female presents with abdominal cramping a few days ago and diarrhea, now reports that she has been having some leg cramps, was drinking a significant amount of water after the cramps started and some Gatorade, temporarily felt better but then the cramps in her legs came back.  The patient reports she has not had diarrhea for a few days, and her abdomen is at this time nontender, but the leg cramps persist.  The patient reports she has had no fevers, chest pain, cough, shortness of breath, she had nausea but no vomiting, which has resolved, and no paresthesias, weakness in her extremities, no recent trauma, no other complaints.  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        Review of Systems   Constitutional:  Negative  for fever.   Respiratory:  Negative for cough and shortness of breath.    Cardiovascular:  Negative for chest pain.   Gastrointestinal:  Negative for abdominal pain, constipation, diarrhea, nausea and vomiting.   Genitourinary:  Negative for dysuria and hematuria.   Musculoskeletal:  Positive for myalgias.   Neurological:  Negative for light-headedness and headaches.           Objective       ED Triage Vitals   Temperature Pulse Blood Pressure Respirations SpO2 Patient Position - Orthostatic VS   07/11/25 1855 07/11/25 1855 07/11/25 1855 07/11/25 1855 07/11/25 1855 07/11/25 1855   98.3 °F (36.8 °C) 97 133/82 20 99 % Sitting      Temp src Heart Rate Source BP Location FiO2 (%) Pain Score    -- 07/11/25 1855 07/11/25 1855 -- 07/11/25 2037     Monitor Right arm  7      Vitals      Date and Time Temp Pulse SpO2 Resp BP Pain Score FACES Pain Rating User   07/11/25 2037 -- -- -- -- -- 7 -- CG   07/11/25 1855 98.3 °F (36.8 °C) 97 99 % 20 133/82 -- -- KM            Physical Exam  Vitals and nursing note reviewed.   Constitutional:       General: She is not in acute distress.     Appearance: Normal appearance. She is well-developed.   HENT:      Head: Normocephalic and atraumatic.     Eyes:      Extraocular Movements: Extraocular movements intact.      Conjunctiva/sclera: Conjunctivae normal.       Cardiovascular:      Rate and Rhythm: Normal rate and regular rhythm.   Pulmonary:      Effort: Pulmonary effort is normal. No respiratory distress.      Breath sounds: Normal breath sounds.   Abdominal:      General: There is no distension.      Palpations: Abdomen is soft.      Tenderness: There is no abdominal tenderness.     Musculoskeletal:         General: No swelling.      Cervical back: Normal range of motion.      Comments: No tenderness to palpation of the bilateral thighs, calves, Homans' sign is negative, no swelling or bruising, DP and PT pulses were present and normal bilaterally, no weakness in either lower  extremity, no hip pain, no other abnormalities noted.     Skin:     General: Skin is warm and dry.      Capillary Refill: Capillary refill takes less than 2 seconds.     Neurological:      General: No focal deficit present.      Mental Status: She is alert and oriented to person, place, and time.     Psychiatric:         Mood and Affect: Mood normal.         Results Reviewed       Procedure Component Value Units Date/Time    Comprehensive metabolic panel [[id number]] Collected: 07/11/25 2043    Lab Status: Final result Specimen: Blood from Arm, Left Updated: 07/11/25 2107     Sodium 140 mmol/L      Potassium 3.7 mmol/L      Chloride 106 mmol/L      CO2 27 mmol/L      ANION GAP 7 mmol/L      BUN 8 mg/dL      Creatinine 0.65 mg/dL      Glucose 90 mg/dL      Calcium 9.1 mg/dL      AST 18 U/L      ALT 15 U/L      Alkaline Phosphatase 75 U/L      Total Protein 7.3 g/dL      Albumin 4.3 g/dL      Total Bilirubin 0.47 mg/dL      eGFR 100 ml/min/1.73sq m     Narrative:      [employer] Kid[employer] guidelines for Chronic Kidney Disease (CKD):     Stage 1 with normal or high GFR (GFR > 90 mL/min/1.73 square meters)    Stage 2 Mild CKD (GFR = 60-89 mL/min/1.73 square meters)    Stage 3A Moderate CKD (GFR = 45-59 mL/min/1.73 square meters)    Stage 3B Moderate CKD (GFR = 30-44 mL/min/1.73 square meters)    Stage 4 Severe CKD (GFR = 15-29 mL/min/1.73 square meters)    Stage 5 End Stage CKD (GFR <15 mL/min/1.73 square meters)  Note: GFR calculation is accurate only with a steady state creatinine    Magnesium [[id number]]  (Normal) Collected: 07/11/25 2043    Lab Status: Final result Specimen: Blood from Arm, Left Updated: 07/11/25 2107     Magnesium 2.2 mg/dL     CK [[id number]]  (Normal) Collected: 07/11/25 2043    Lab Status: Final result Specimen: Blood from Arm, Left Updated: 07/11/25 2107     Total CK 82 U/L     CBC and differential [[id number]] Collected: 07/11/25 2043    Lab Status: Final result Specimen: Blood from  Arm, Left Updated: 07/11/25 2052     WBC 6.66 Thousand/uL      RBC 4.47 Million/uL      Hemoglobin 14.2 g/dL      Hematocrit 41.4 %      MCV 93 fL      MCH 31.8 pg      MCHC 34.3 g/dL      RDW 12.5 %      MPV 9.4 fL      Platelets 203 Thousands/uL      nRBC 0 /100 WBCs      Segmented % 51 %      Immature Grans % 0 %      Lymphocytes % 38 %      Monocytes % 8 %      Eosinophils Relative 2 %      Basophils Relative 1 %      Absolute Neutrophils 3.42 Thousands/µL      Absolute Immature Grans 0.01 Thousand/uL      Absolute Lymphocytes 2.56 Thousands/µL      Absolute Monocytes 0.51 Thousand/µL      Eosinophils Absolute 0.12 Thousand/µL      Basophils Absolute 0.04 Thousands/µL             No orders to display       Procedures    ED Medication and Procedure Management   Prior to Admission Medications   Prescriptions Last Dose Informant Patient Reported? Taking?   ALPRAZolam (XANAX) 0.25 mg tablet   No No   Sig: Take 1 tablet (0.25 mg total) by mouth daily at bedtime as needed for anxiety   Cholecalciferol (VITAMIN D3 PO)  Self Yes No   Sig: Take by mouth   Cyanocobalamin (VITAMIN B12 PO)  Self Yes No   Sig: Take by mouth   clobetasol (TEMOVATE) 0.05 % ointment  Self Yes No   dexamethasone 0.5 MG/5ML elixir   No No   Sig: Rinse with 1 tspful for 2 mins 2-4 times / day then spit out x 5-7 days   famotidine (PEPCID) 40 MG tablet   No No   Sig: Take 1 tablet (40 mg total) by mouth daily   ferrous sulfate 325 (65 FE) MG EC tablet  Self Yes No   Sig: Take 325 mg by mouth   hydroxychloroquine (PLAQUENIL) 200 mg tablet  Self Yes No   predniSONE 10 mg tablet   Yes No   Sig: Take 10 mg by mouth daily   valACYclovir (VALTREX) 500 mg tablet  Self No No   Sig: Use one tablet twice daily with outbreaks. Start at sx onset.      Facility-Administered Medications: None     Discharge Medication List as of 7/11/2025  9:34 PM        CONTINUE these medications which have NOT CHANGED    Details   ALPRAZolam (XANAX) 0.25 mg tablet Take 1 tablet  (0.25 mg total) by mouth daily at bedtime as needed for anxiety, Starting Tue 7/8/2025, Normal      Cholecalciferol (VITAMIN D3 PO) Take by mouth, Historical Med      clobetasol (TEMOVATE) 0.05 % ointment Historical Med      Cyanocobalamin (VITAMIN B12 PO) Take by mouth, Historical Med      dexamethasone 0.5 MG/5ML elixir Rinse with 1 tspful for 2 mins 2-4 times / day then spit out x 5-7 days, Normal      famotidine (PEPCID) 40 MG tablet Take 1 tablet (40 mg total) by mouth daily, Starting Thu 6/26/2025, Until Wed 9/24/2025, Normal      ferrous sulfate 325 (65 FE) MG EC tablet Take 325 mg by mouth, Historical Med      hydroxychloroquine (PLAQUENIL) 200 mg tablet Historical Med      predniSONE 10 mg tablet Take 10 mg by mouth daily, Historical Med      valACYclovir (VALTREX) 500 mg tablet Use one tablet twice daily with outbreaks. Start at sx onset., Normal           No discharge procedures on file.  ED SEPSIS DOCUMENTATION   Time reflects when diagnosis was documented in both MDM as applicable and the Disposition within this note       Time User Action Codes Description Comment    7/11/2025  9:34 PM Macho Hays Add [R25.2] Bilateral leg cramps                      [1]   Past Medical History:  Diagnosis Date    Anxiety     Cancer (HCC)     skin basal cell    Colitis     Disease of thyroid gland     Erythema multiforme     GERD (gastroesophageal reflux disease)     Hx of basal cell carcinoma 06/22/2023    Hx of oral aphthous ulcers     Lupus     Migraine headache     Osteoarthritis     Thyroid nodule     Ulcerative colitis (HCC)     Undifferentiated connective tissue disease (HCC)    [2]   Past Surgical History:  Procedure Laterality Date    EGD AND COLONOSCOPY N/A 10/04/2018    Procedure: EGD AND COLONOSCOPY;  Surgeon: Heidy Bernard MD;  Location: [facility];  Service: Gastroenterology    FACIAL COSMETIC SURGERY Right     mole surgery for cancer    THYROIDECTOMY Right    [3]   Family History  Problem  Relation Name Age of Onset    Breast cancer Mother Virginia 70    Coronary artery disease Mother Virginia     Heart disease Mother Virginia     Non Hodgkin's lymphoma (HCC) Father Willem Linares     Diabetes Maternal Grandmother Margdorita     Lymphoma Maternal Grandmother Margdorita     No Known Problems Maternal Grandfather      Dementia Paternal Grandmother      Natural death of unknown etiology Paternal Grandfather      Basal cell carcinoma Brother      Basal cell carcinoma Brother      No Known Problems Brother      No Known Problems Brother      No Known Problems Son      Heart disease Maternal Aunt Priti     No Known Problems Maternal Aunt Shiela     No Known Problems Paternal Aunt      No Known Problems Paternal Uncle      Heart attack Maternal Uncle     [4]   Social History  Tobacco Use    Smoking status: Never    Smokeless tobacco: Never   Vaping Use    Vaping status: Never Used   Substance Use Topics    Alcohol use: Yes     Comment: 1 per month    Drug use: No        Macho Hays MD  07/11/25 [id number]

## 2025-07-15 ENCOUNTER — HOSPITAL ENCOUNTER (OUTPATIENT)
Dept: INFUSION CENTER | Facility: CLINIC | Age: 55
Discharge: HOME | End: 2025-07-15
Attending: FAMILY MEDICINE
Payer: COMMERCIAL

## 2025-07-15 VITALS
SYSTOLIC BLOOD PRESSURE: 114 MMHG | OXYGEN SATURATION: 98 % | DIASTOLIC BLOOD PRESSURE: 72 MMHG | HEART RATE: 68 BPM | TEMPERATURE: 98 F

## 2025-07-15 DIAGNOSIS — E53.8 B12 DEFICIENCY: ICD-10-CM

## 2025-07-15 DIAGNOSIS — E61.1 IRON DEFICIENCY: Primary | ICD-10-CM

## 2025-07-15 PROCEDURE — 96365 THER/PROPH/DIAG IV INF INIT: CPT

## 2025-07-15 PROCEDURE — 96372 THER/PROPH/DIAG INJ SC/IM: CPT

## 2025-07-15 RX ORDER — CYANOCOBALAMIN 1000 UG/ML
1000 INJECTION, SOLUTION INTRAMUSCULAR; SUBCUTANEOUS ONCE
Status: CANCELLED | OUTPATIENT
Start: 2025-07-22 | End: 2025-07-22

## 2025-07-15 RX ORDER — SODIUM CHLORIDE 9 MG/ML
20 INJECTION, SOLUTION INTRAVENOUS ONCE
Status: COMPLETED | OUTPATIENT
Start: 2025-07-15 | End: 2025-07-15

## 2025-07-15 RX ORDER — SODIUM CHLORIDE 9 MG/ML
20 INJECTION, SOLUTION INTRAVENOUS ONCE
Status: CANCELLED | OUTPATIENT
Start: 2025-07-22

## 2025-07-15 RX ORDER — CYANOCOBALAMIN 1000 UG/ML
1000 INJECTION, SOLUTION INTRAMUSCULAR; SUBCUTANEOUS ONCE
Status: COMPLETED | OUTPATIENT
Start: 2025-07-15 | End: 2025-07-15

## 2025-07-15 RX ADMIN — IRON SUCROSE 200 MG: 20 INJECTION, SOLUTION INTRAVENOUS at 16:13

## 2025-07-15 RX ADMIN — SODIUM CHLORIDE 20 ML/HR: 0.9 INJECTION, SOLUTION INTRAVENOUS at 16:13

## 2025-07-15 RX ADMIN — CYANOCOBALAMIN 1000 MCG: 1000 INJECTION INTRAMUSCULAR; SUBCUTANEOUS at 16:14

## 2025-07-22 ENCOUNTER — TELEPHONE (OUTPATIENT)
Dept: FAMILY MEDICINE CLINIC | Facility: CLINIC | Age: 55
End: 2025-07-22

## 2025-07-22 ENCOUNTER — HOSPITAL ENCOUNTER (OUTPATIENT)
Dept: INFUSION CENTER | Facility: CLINIC | Age: 55
Discharge: HOME | End: 2025-07-22
Attending: FAMILY MEDICINE
Payer: COMMERCIAL

## 2025-07-22 DIAGNOSIS — E53.8 B12 DEFICIENCY: ICD-10-CM

## 2025-07-22 DIAGNOSIS — E61.1 IRON DEFICIENCY: Primary | ICD-10-CM

## 2025-07-22 PROCEDURE — 96365 THER/PROPH/DIAG IV INF INIT: CPT

## 2025-07-22 PROCEDURE — 96372 THER/PROPH/DIAG INJ SC/IM: CPT

## 2025-07-22 RX ORDER — CYANOCOBALAMIN 1000 UG/ML
1000 INJECTION, SOLUTION INTRAMUSCULAR; SUBCUTANEOUS ONCE
Status: COMPLETED | OUTPATIENT
Start: 2025-07-22 | End: 2025-07-22

## 2025-07-22 RX ORDER — SODIUM CHLORIDE 9 MG/ML
20 INJECTION, SOLUTION INTRAVENOUS ONCE
Status: COMPLETED | OUTPATIENT
Start: 2025-07-22 | End: 2025-07-22

## 2025-07-22 RX ORDER — CYANOCOBALAMIN 1000 UG/ML
1000 INJECTION, SOLUTION INTRAMUSCULAR; SUBCUTANEOUS ONCE
OUTPATIENT
Start: 2025-07-30 | End: 2025-07-29

## 2025-07-22 RX ORDER — SODIUM CHLORIDE 9 MG/ML
20 INJECTION, SOLUTION INTRAVENOUS ONCE
OUTPATIENT
Start: 2025-07-30

## 2025-07-22 RX ADMIN — SODIUM CHLORIDE 20 ML/HR: 9 INJECTION, SOLUTION INTRAVENOUS at 16:19

## 2025-07-22 RX ADMIN — IRON SUCROSE 200 MG: 20 INJECTION, SOLUTION INTRAVENOUS at 16:21

## 2025-07-22 RX ADMIN — CYANOCOBALAMIN 1000 MCG: 1000 INJECTION INTRAMUSCULAR; SUBCUTANEOUS at 16:23

## 2025-07-22 NOTE — PROGRESS NOTES
Patient tolerated treatment without issue. PIV removed intact, coban wrap in place. Patient aware of next appt at [facility] 7/30 at 4PM, declines AVS.

## 2025-07-22 NOTE — PROGRESS NOTES
Patient arrives for Venofer + B12 injection. PIV placed by VERA RN, patient tolerated well. B12 injection to L deltoid by VERA RN, bandaid in place. Patient resting on recliner chair, call bell within reach.

## 2025-07-30 ENCOUNTER — HOSPITAL ENCOUNTER (OUTPATIENT)
Dept: INFUSION CENTER | Facility: CLINIC | Age: 55
Discharge: HOME | End: 2025-07-30
Attending: FAMILY MEDICINE
Payer: COMMERCIAL

## 2025-07-30 DIAGNOSIS — E53.8 B12 DEFICIENCY: ICD-10-CM

## 2025-07-30 DIAGNOSIS — E61.1 IRON DEFICIENCY: Primary | ICD-10-CM

## 2025-07-30 PROCEDURE — 96365 THER/PROPH/DIAG IV INF INIT: CPT

## 2025-07-30 PROCEDURE — 96372 THER/PROPH/DIAG INJ SC/IM: CPT

## 2025-07-30 RX ORDER — CYANOCOBALAMIN 1000 UG/ML
1000 INJECTION, SOLUTION INTRAMUSCULAR; SUBCUTANEOUS ONCE
Status: COMPLETED | OUTPATIENT
Start: 2025-07-30 | End: 2025-07-30

## 2025-07-30 RX ORDER — SODIUM CHLORIDE 9 MG/ML
20 INJECTION, SOLUTION INTRAVENOUS ONCE
Status: COMPLETED | OUTPATIENT
Start: 2025-07-30 | End: 2025-07-30

## 2025-07-30 RX ORDER — CYANOCOBALAMIN 1000 UG/ML
1000 INJECTION, SOLUTION INTRAMUSCULAR; SUBCUTANEOUS ONCE
Status: CANCELLED | OUTPATIENT
Start: 2025-08-05 | End: 2025-08-05

## 2025-07-30 RX ORDER — SODIUM CHLORIDE 9 MG/ML
20 INJECTION, SOLUTION INTRAVENOUS ONCE
Status: CANCELLED | OUTPATIENT
Start: 2025-08-05

## 2025-07-30 RX ADMIN — IRON SUCROSE 200 MG: 20 INJECTION, SOLUTION INTRAVENOUS at 16:23

## 2025-07-30 RX ADMIN — CYANOCOBALAMIN 1000 MCG: 1000 INJECTION INTRAMUSCULAR; SUBCUTANEOUS at 16:24

## 2025-07-30 RX ADMIN — SODIUM CHLORIDE 20 ML/HR: 0.9 INJECTION, SOLUTION INTRAVENOUS at 16:24
